# Patient Record
Sex: FEMALE | Race: BLACK OR AFRICAN AMERICAN | Employment: OTHER | URBAN - METROPOLITAN AREA
[De-identification: names, ages, dates, MRNs, and addresses within clinical notes are randomized per-mention and may not be internally consistent; named-entity substitution may affect disease eponyms.]

---

## 2017-01-16 ENCOUNTER — OFFICE VISIT (OUTPATIENT)
Dept: PRIMARY CARE CLINIC | Age: 82
End: 2017-01-16

## 2017-01-16 VITALS
BODY MASS INDEX: 32.49 KG/M2 | DIASTOLIC BLOOD PRESSURE: 72 MMHG | WEIGHT: 195 LBS | HEIGHT: 65 IN | SYSTOLIC BLOOD PRESSURE: 134 MMHG

## 2017-01-16 DIAGNOSIS — I10 ESSENTIAL HYPERTENSION: ICD-10-CM

## 2017-01-16 PROCEDURE — 99213 OFFICE O/P EST LOW 20 MIN: CPT | Performed by: INTERNAL MEDICINE

## 2017-01-16 ASSESSMENT — ENCOUNTER SYMPTOMS
DIARRHEA: 0
CONSTIPATION: 0
ABDOMINAL PAIN: 0
EYE REDNESS: 0
SORE THROAT: 0
WHEEZING: 0
EYE ITCHING: 0
NAUSEA: 0
VOMITING: 0
COUGH: 0
BACK PAIN: 0
CHEST TIGHTNESS: 0

## 2017-03-01 ENCOUNTER — TELEPHONE (OUTPATIENT)
Dept: PRIMARY CARE CLINIC | Age: 82
End: 2017-03-01

## 2017-03-01 DIAGNOSIS — Z96.659 PAINFUL TOTAL KNEE REPLACEMENT, INITIAL ENCOUNTER (HCC): Primary | ICD-10-CM

## 2017-03-01 DIAGNOSIS — T84.84XA PAINFUL TOTAL KNEE REPLACEMENT, INITIAL ENCOUNTER (HCC): Primary | ICD-10-CM

## 2017-04-17 ENCOUNTER — HOSPITAL ENCOUNTER (OUTPATIENT)
Dept: MAMMOGRAPHY | Age: 82
Discharge: HOME | End: 2017-04-17
Attending: INTERNAL MEDICINE | Admitting: INTERNAL MEDICINE

## 2017-04-17 DIAGNOSIS — Z12.31 VISIT FOR SCREENING MAMMOGRAM: ICD-10-CM

## 2017-04-24 ENCOUNTER — OFFICE VISIT (OUTPATIENT)
Dept: PRIMARY CARE CLINIC | Age: 82
End: 2017-04-24

## 2017-04-24 ENCOUNTER — TELEPHONE (OUTPATIENT)
Dept: PRIMARY CARE CLINIC | Age: 82
End: 2017-04-24

## 2017-04-24 VITALS
DIASTOLIC BLOOD PRESSURE: 72 MMHG | SYSTOLIC BLOOD PRESSURE: 118 MMHG | BODY MASS INDEX: 32.82 KG/M2 | WEIGHT: 197 LBS | HEIGHT: 65 IN

## 2017-04-24 DIAGNOSIS — E11.9 CONTROLLED TYPE 2 DIABETES MELLITUS WITHOUT COMPLICATION, WITHOUT LONG-TERM CURRENT USE OF INSULIN (HCC): Primary | ICD-10-CM

## 2017-04-24 DIAGNOSIS — E78.2 MIXED HYPERLIPIDEMIA: ICD-10-CM

## 2017-04-24 DIAGNOSIS — I10 ESSENTIAL HYPERTENSION: ICD-10-CM

## 2017-04-24 LAB
A/G RATIO: 1.6 (ref 1.1–2.2)
ALBUMIN SERPL-MCNC: 4.2 G/DL (ref 3.4–5)
ALP BLD-CCNC: 65 U/L (ref 40–129)
ALT SERPL-CCNC: 9 U/L (ref 10–40)
ANION GAP SERPL CALCULATED.3IONS-SCNC: 15 MMOL/L (ref 3–16)
AST SERPL-CCNC: 14 U/L (ref 15–37)
BILIRUB SERPL-MCNC: <0.2 MG/DL (ref 0–1)
BUN BLDV-MCNC: 13 MG/DL (ref 7–20)
CALCIUM SERPL-MCNC: 9.2 MG/DL (ref 8.3–10.6)
CHLORIDE BLD-SCNC: 103 MMOL/L (ref 99–110)
CHOLESTEROL, TOTAL: 160 MG/DL (ref 0–199)
CO2: 26 MMOL/L (ref 21–32)
CREAT SERPL-MCNC: 0.8 MG/DL (ref 0.6–1.2)
GFR AFRICAN AMERICAN: >60
GFR NON-AFRICAN AMERICAN: >60
GLOBULIN: 2.7 G/DL
GLUCOSE BLD-MCNC: 115 MG/DL (ref 70–99)
HDLC SERPL-MCNC: 64 MG/DL (ref 40–60)
LDL CHOLESTEROL CALCULATED: 69 MG/DL
POTASSIUM SERPL-SCNC: 4.7 MMOL/L (ref 3.5–5.1)
SODIUM BLD-SCNC: 144 MMOL/L (ref 136–145)
TOTAL PROTEIN: 6.9 G/DL (ref 6.4–8.2)
TRIGL SERPL-MCNC: 136 MG/DL (ref 0–150)
VLDLC SERPL CALC-MCNC: 27 MG/DL

## 2017-04-24 PROCEDURE — 99214 OFFICE O/P EST MOD 30 MIN: CPT | Performed by: INTERNAL MEDICINE

## 2017-04-24 PROCEDURE — 36415 COLL VENOUS BLD VENIPUNCTURE: CPT | Performed by: INTERNAL MEDICINE

## 2017-04-24 ASSESSMENT — ENCOUNTER SYMPTOMS
VOMITING: 0
EYE REDNESS: 0
BACK PAIN: 0
WHEEZING: 0
ABDOMINAL PAIN: 0
NAUSEA: 0
EYE ITCHING: 0
COUGH: 0
CONSTIPATION: 0
CHEST TIGHTNESS: 0
DIARRHEA: 0
SORE THROAT: 0

## 2017-04-25 LAB
ESTIMATED AVERAGE GLUCOSE: 131.2 MG/DL
HBA1C MFR BLD: 6.2 %

## 2017-06-12 RX ORDER — AMLODIPINE BESYLATE 5 MG/1
TABLET ORAL
Qty: 90 TABLET | Refills: 0 | Status: SHIPPED | OUTPATIENT
Start: 2017-06-12 | End: 2017-07-31

## 2017-06-21 RX ORDER — OMEPRAZOLE 40 MG/1
CAPSULE, DELAYED RELEASE ORAL
Qty: 90 CAPSULE | Refills: 0 | Status: SHIPPED | OUTPATIENT
Start: 2017-06-21 | End: 2017-09-17 | Stop reason: SDUPTHER

## 2017-07-31 ENCOUNTER — OFFICE VISIT (OUTPATIENT)
Dept: PRIMARY CARE CLINIC | Age: 82
End: 2017-07-31

## 2017-07-31 VITALS
WEIGHT: 198.8 LBS | SYSTOLIC BLOOD PRESSURE: 148 MMHG | HEART RATE: 72 BPM | BODY MASS INDEX: 33.08 KG/M2 | DIASTOLIC BLOOD PRESSURE: 74 MMHG

## 2017-07-31 DIAGNOSIS — I10 ESSENTIAL HYPERTENSION: ICD-10-CM

## 2017-07-31 DIAGNOSIS — E78.2 MIXED HYPERLIPIDEMIA: ICD-10-CM

## 2017-07-31 DIAGNOSIS — E11.9 CONTROLLED TYPE 2 DIABETES MELLITUS WITHOUT COMPLICATION, WITHOUT LONG-TERM CURRENT USE OF INSULIN (HCC): ICD-10-CM

## 2017-07-31 PROCEDURE — 99214 OFFICE O/P EST MOD 30 MIN: CPT | Performed by: INTERNAL MEDICINE

## 2017-07-31 RX ORDER — LISINOPRIL 20 MG/1
20 TABLET ORAL DAILY
Qty: 30 TABLET | Refills: 1 | Status: SHIPPED | OUTPATIENT
Start: 2017-07-31 | End: 2017-07-31 | Stop reason: SDUPTHER

## 2017-07-31 RX ORDER — LISINOPRIL 20 MG/1
TABLET ORAL
Qty: 90 TABLET | Refills: 1 | Status: SHIPPED | OUTPATIENT
Start: 2017-07-31 | End: 2017-08-28 | Stop reason: SDUPTHER

## 2017-07-31 ASSESSMENT — ENCOUNTER SYMPTOMS
CONSTIPATION: 0
WHEEZING: 0
BACK PAIN: 0
CHEST TIGHTNESS: 0
VOMITING: 0
ABDOMINAL PAIN: 0
EYE ITCHING: 0
NAUSEA: 0
DIARRHEA: 0
SORE THROAT: 0
EYE REDNESS: 0
COUGH: 0

## 2017-07-31 ASSESSMENT — PATIENT HEALTH QUESTIONNAIRE - PHQ9
SUM OF ALL RESPONSES TO PHQ QUESTIONS 1-9: 0
1. LITTLE INTEREST OR PLEASURE IN DOING THINGS: 0
2. FEELING DOWN, DEPRESSED OR HOPELESS: 0
SUM OF ALL RESPONSES TO PHQ9 QUESTIONS 1 & 2: 0

## 2017-08-10 RX ORDER — FLUTICASONE PROPIONATE 50 MCG
SPRAY, SUSPENSION (ML) NASAL
Qty: 1 BOTTLE | Refills: 3 | Status: SHIPPED | OUTPATIENT
Start: 2017-08-10 | End: 2017-10-04 | Stop reason: SDUPTHER

## 2017-08-28 ENCOUNTER — OFFICE VISIT (OUTPATIENT)
Dept: PRIMARY CARE CLINIC | Age: 82
End: 2017-08-28

## 2017-08-28 VITALS
SYSTOLIC BLOOD PRESSURE: 148 MMHG | HEIGHT: 65 IN | HEART RATE: 75 BPM | WEIGHT: 204.6 LBS | DIASTOLIC BLOOD PRESSURE: 72 MMHG | BODY MASS INDEX: 34.09 KG/M2

## 2017-08-28 DIAGNOSIS — I10 ESSENTIAL HYPERTENSION: ICD-10-CM

## 2017-08-28 PROCEDURE — G0008 ADMIN INFLUENZA VIRUS VAC: HCPCS | Performed by: INTERNAL MEDICINE

## 2017-08-28 PROCEDURE — 90662 IIV NO PRSV INCREASED AG IM: CPT | Performed by: INTERNAL MEDICINE

## 2017-08-28 PROCEDURE — 99213 OFFICE O/P EST LOW 20 MIN: CPT | Performed by: INTERNAL MEDICINE

## 2017-08-28 RX ORDER — CETIRIZINE HYDROCHLORIDE 10 MG/1
10 TABLET ORAL DAILY
Qty: 30 TABLET | Refills: 1 | Status: SHIPPED | OUTPATIENT
Start: 2017-08-28 | End: 2017-12-13

## 2017-08-28 RX ORDER — LISINOPRIL 40 MG/1
40 TABLET ORAL DAILY
Qty: 90 TABLET | Refills: 0 | Status: SHIPPED | OUTPATIENT
Start: 2017-08-28 | End: 2017-12-13

## 2017-08-28 ASSESSMENT — ENCOUNTER SYMPTOMS
DIARRHEA: 0
BACK PAIN: 0
CONSTIPATION: 0
VOMITING: 0
EYE REDNESS: 0
SORE THROAT: 0
EYE ITCHING: 0
WHEEZING: 0
NAUSEA: 0
ABDOMINAL PAIN: 0
COUGH: 0
CHEST TIGHTNESS: 0

## 2017-09-11 ENCOUNTER — TELEPHONE (OUTPATIENT)
Dept: PRIMARY CARE CLINIC | Age: 82
End: 2017-09-11

## 2017-09-13 ENCOUNTER — OFFICE VISIT (OUTPATIENT)
Dept: PRIMARY CARE CLINIC | Age: 82
End: 2017-09-13

## 2017-09-13 ENCOUNTER — HOSPITAL ENCOUNTER (OUTPATIENT)
Dept: OTHER | Age: 82
Discharge: HOME | End: 2017-09-13
Attending: INTERNAL MEDICINE | Admitting: INTERNAL MEDICINE

## 2017-09-13 VITALS
HEART RATE: 64 BPM | OXYGEN SATURATION: 100 % | SYSTOLIC BLOOD PRESSURE: 142 MMHG | TEMPERATURE: 98.7 F | WEIGHT: 200.4 LBS | BODY MASS INDEX: 33.35 KG/M2 | DIASTOLIC BLOOD PRESSURE: 86 MMHG

## 2017-09-13 DIAGNOSIS — R05.9 COUGH: ICD-10-CM

## 2017-09-13 DIAGNOSIS — R05.9 COUGH: Primary | ICD-10-CM

## 2017-09-13 PROCEDURE — 99214 OFFICE O/P EST MOD 30 MIN: CPT | Performed by: INTERNAL MEDICINE

## 2017-09-13 RX ORDER — AZITHROMYCIN 250 MG/1
TABLET, FILM COATED ORAL
Qty: 6 TABLET | Refills: 0 | Status: SHIPPED | OUTPATIENT
Start: 2017-09-13 | End: 2017-09-23

## 2017-09-13 RX ORDER — BENZONATATE 100 MG/1
100 CAPSULE ORAL 3 TIMES DAILY PRN
Qty: 21 CAPSULE | Refills: 0 | Status: SHIPPED | OUTPATIENT
Start: 2017-09-13 | End: 2017-09-20

## 2017-09-18 RX ORDER — OMEPRAZOLE 40 MG/1
CAPSULE, DELAYED RELEASE ORAL
Qty: 90 CAPSULE | Refills: 0 | Status: SHIPPED | OUTPATIENT
Start: 2017-09-18 | End: 2017-12-16 | Stop reason: SDUPTHER

## 2017-10-02 ENCOUNTER — TELEPHONE (OUTPATIENT)
Dept: PRIMARY CARE CLINIC | Age: 82
End: 2017-10-02

## 2017-10-02 NOTE — TELEPHONE ENCOUNTER
Patient calling saying that the medication that Dr. Iris Baltazar prescribed for the cough is not working she said that she has had this for almost 3 months. She took her ABX but still nothing is helping. She stated that she does have an appointment for Monday the 9th.

## 2017-10-03 NOTE — TELEPHONE ENCOUNTER
Ask her if she is using zyrtec flonase and singulair. If not start all three and farrukh demarco as planned.

## 2017-10-04 RX ORDER — FLUTICASONE PROPIONATE 50 MCG
SPRAY, SUSPENSION (ML) NASAL
Qty: 1 BOTTLE | Refills: 3 | Status: SHIPPED | OUTPATIENT
Start: 2017-10-04 | End: 2017-10-04 | Stop reason: SDUPTHER

## 2017-10-04 RX ORDER — FLUTICASONE PROPIONATE 50 MCG
SPRAY, SUSPENSION (ML) NASAL
Qty: 3 BOTTLE | Refills: 0 | Status: SHIPPED | OUTPATIENT
Start: 2017-10-04 | End: 2018-07-11

## 2017-10-04 NOTE — TELEPHONE ENCOUNTER
Spoke to patient and she was not using the Flonase nasal spray and said she does not have it. Advised patient that rx would be called to the pharmacy for her.

## 2017-10-09 ENCOUNTER — OFFICE VISIT (OUTPATIENT)
Dept: PRIMARY CARE CLINIC | Age: 82
End: 2017-10-09

## 2017-10-09 VITALS
BODY MASS INDEX: 33.66 KG/M2 | WEIGHT: 202 LBS | DIASTOLIC BLOOD PRESSURE: 76 MMHG | HEIGHT: 65 IN | SYSTOLIC BLOOD PRESSURE: 138 MMHG

## 2017-10-09 DIAGNOSIS — I10 ESSENTIAL HYPERTENSION: ICD-10-CM

## 2017-10-09 DIAGNOSIS — E11.9 CONTROLLED TYPE 2 DIABETES MELLITUS WITHOUT COMPLICATION, WITHOUT LONG-TERM CURRENT USE OF INSULIN (HCC): ICD-10-CM

## 2017-10-09 DIAGNOSIS — E11.9 CONTROLLED TYPE 2 DIABETES MELLITUS WITHOUT COMPLICATION, WITHOUT LONG-TERM CURRENT USE OF INSULIN (HCC): Primary | ICD-10-CM

## 2017-10-09 PROCEDURE — 99213 OFFICE O/P EST LOW 20 MIN: CPT | Performed by: INTERNAL MEDICINE

## 2017-10-09 RX ORDER — GABAPENTIN 300 MG/1
CAPSULE ORAL
Qty: 90 CAPSULE | Refills: 1 | Status: SHIPPED | OUTPATIENT
Start: 2017-10-09 | End: 2017-12-08 | Stop reason: SDUPTHER

## 2017-10-09 ASSESSMENT — ENCOUNTER SYMPTOMS
VOMITING: 0
COUGH: 0
EYE ITCHING: 0
EYE REDNESS: 0
BACK PAIN: 0
DIARRHEA: 0
CHEST TIGHTNESS: 0
SORE THROAT: 0
NAUSEA: 0
WHEEZING: 0
CONSTIPATION: 0
ABDOMINAL PAIN: 0

## 2017-10-09 NOTE — ASSESSMENT & PLAN NOTE
Stable. BP improved. Cough significantly improved with restart of allergy meds. -Topic and history reviewed. -Continue current regimen.

## 2017-10-09 NOTE — PROGRESS NOTES
Head: Normocephalic and atraumatic. Eyes: Conjunctivae are normal. Right eye exhibits no discharge. Left eye exhibits no discharge. No scleral icterus. Cardiovascular: Normal rate, regular rhythm and normal heart sounds. Exam reveals no gallop and no friction rub. No murmur heard. Pulmonary/Chest: Effort normal and breath sounds normal. No respiratory distress. She has no wheezes. She has no rales. Abdominal: Soft. Bowel sounds are normal. There is no tenderness. There is no guarding. Neurological: She is alert. Coordination normal.   Skin: Skin is warm and dry. She is not diaphoretic. Psychiatric: She has a normal mood and affect. Judgment normal.   Vitals reviewed. Assessment:      HTN (hypertension)  Stable. BP improved. Cough significantly improved with restart of allergy meds. -Topic and history reviewed. -Continue current regimen.           Plan:      Above

## 2017-10-10 LAB
ESTIMATED AVERAGE GLUCOSE: 134.1 MG/DL
HBA1C MFR BLD: 6.3 %

## 2017-10-20 ENCOUNTER — TELEPHONE (OUTPATIENT)
Dept: PRIMARY CARE CLINIC | Age: 82
End: 2017-10-20

## 2017-10-20 DIAGNOSIS — R09.81 NASAL CONGESTION: Primary | ICD-10-CM

## 2017-10-23 ENCOUNTER — TELEPHONE (OUTPATIENT)
Dept: PRIMARY CARE CLINIC | Age: 82
End: 2017-10-23

## 2017-10-31 RX ORDER — MONTELUKAST SODIUM 10 MG/1
TABLET ORAL
Qty: 30 TABLET | Refills: 3 | Status: SHIPPED | OUTPATIENT
Start: 2017-10-31

## 2017-12-13 ENCOUNTER — OFFICE VISIT (OUTPATIENT)
Dept: PRIMARY CARE CLINIC | Age: 82
End: 2017-12-13

## 2017-12-13 VITALS
SYSTOLIC BLOOD PRESSURE: 170 MMHG | DIASTOLIC BLOOD PRESSURE: 84 MMHG | BODY MASS INDEX: 33.88 KG/M2 | WEIGHT: 203.6 LBS | HEART RATE: 72 BPM

## 2017-12-13 DIAGNOSIS — I10 ESSENTIAL HYPERTENSION: ICD-10-CM

## 2017-12-13 DIAGNOSIS — E11.9 CONTROLLED TYPE 2 DIABETES MELLITUS WITHOUT COMPLICATION, WITHOUT LONG-TERM CURRENT USE OF INSULIN (HCC): ICD-10-CM

## 2017-12-13 DIAGNOSIS — E78.2 MIXED HYPERLIPIDEMIA: ICD-10-CM

## 2017-12-13 DIAGNOSIS — J30.1 SEASONAL ALLERGIC RHINITIS DUE TO POLLEN, UNSPECIFIED CHRONICITY: ICD-10-CM

## 2017-12-13 PROCEDURE — 1090F PRES/ABSN URINE INCON ASSESS: CPT | Performed by: INTERNAL MEDICINE

## 2017-12-13 PROCEDURE — 4040F PNEUMOC VAC/ADMIN/RCVD: CPT | Performed by: INTERNAL MEDICINE

## 2017-12-13 PROCEDURE — 1123F ACP DISCUSS/DSCN MKR DOCD: CPT | Performed by: INTERNAL MEDICINE

## 2017-12-13 PROCEDURE — 99214 OFFICE O/P EST MOD 30 MIN: CPT | Performed by: INTERNAL MEDICINE

## 2017-12-13 PROCEDURE — G8427 DOCREV CUR MEDS BY ELIG CLIN: HCPCS | Performed by: INTERNAL MEDICINE

## 2017-12-13 PROCEDURE — 1036F TOBACCO NON-USER: CPT | Performed by: INTERNAL MEDICINE

## 2017-12-13 PROCEDURE — G8417 CALC BMI ABV UP PARAM F/U: HCPCS | Performed by: INTERNAL MEDICINE

## 2017-12-13 PROCEDURE — G8484 FLU IMMUNIZE NO ADMIN: HCPCS | Performed by: INTERNAL MEDICINE

## 2017-12-13 RX ORDER — DILTIAZEM HYDROCHLORIDE 180 MG/1
180 CAPSULE, COATED, EXTENDED RELEASE ORAL DAILY
Qty: 30 CAPSULE | Refills: 3 | Status: CANCELLED | OUTPATIENT
Start: 2017-12-13

## 2017-12-13 RX ORDER — DILTIAZEM HYDROCHLORIDE 180 MG/1
CAPSULE, COATED, EXTENDED RELEASE ORAL
Qty: 90 CAPSULE | Refills: 0 | Status: SHIPPED | OUTPATIENT
Start: 2017-12-13 | End: 2018-03-04 | Stop reason: SDUPTHER

## 2017-12-13 ASSESSMENT — ENCOUNTER SYMPTOMS
CHEST TIGHTNESS: 0
NAUSEA: 0
COUGH: 0
DIARRHEA: 0
WHEEZING: 0
BACK PAIN: 0
VOMITING: 0
ABDOMINAL PAIN: 0
EYE ITCHING: 0
CONSTIPATION: 0
EYE REDNESS: 0
SORE THROAT: 0

## 2017-12-13 NOTE — PROGRESS NOTES
Subjective:      Patient ID: Donavan Councilman is a 80 y.o. female. HPI     Patient having cough that is dry and with some mucous in back of throat. Daily and has been occurring for few months. No F/C/ear pain/ST. Otherwise in baseline health. Patient Active Problem List   Diagnosis    Diabetes mellitus type II, controlled ([facility] [address]  diabetes    Current sx include none  Patient DENIES hypoglycemia , chest pain, claudication, dyspnea, polyuria, polydipsia, unintentional weight loss, foot pain or lesions, paresthesias. patient does not check sugars . Patient's  diabetes is treated with low carbohydrate diet, and regular exercise. Patient  denies side effects from prescribed  medications and is compliant.  HTN (hypertension)  HYPERTENSION. Also here for f/u HTN. She indicates that she is feeling well and denies any symptoms referable to her elevated blood pressure. Specifically denies chest pain, palpitations, dyspnea, orthopnea, PND or peripheral edema. No anorexia, arthralgia, or leg cramps noted. Current medication regimen is as listed below. She denies any side effects of medication, and has been taking it regularly. Patient does not exercise regularly      Hyperlipidemia  Dyslipidemia: Patient presents for f/u of lipid disorder. Compliance with treatment thus far has been excellent. A repeat fasting lipid profile was not done. The patient does not have family history of premature CAD. The patient denies side effects from medication including nausea and myalgias. The patient exercises never. Patient is overweight.  Peptic ulcer    Seasonal allergies  Has had some symptoms as above. Taking meds.     Health care maintenance    Cervical radiculopathy at C5 bilaterally and left C7       Vitals:    12/13/17 0850   BP: (!) 170/84   Site: Left Arm   Position: Sitting   Cuff Size: Large Adult   Pulse: 72   Weight: 203 lb 9.6 oz (92.4 kg)      Wt Readings from Last 2 Encounters:   12/13/17 hematuria. Musculoskeletal: Positive for arthralgias. Negative for back pain and joint swelling. Skin: Negative for rash. Neurological: Negative for weakness and headaches. Psychiatric/Behavioral: Negative for dysphoric mood. Objective:   Physical Exam   Constitutional: She appears well-developed and well-nourished. No distress. HENT:   Head: Normocephalic and atraumatic. Eyes: Conjunctivae are normal. Right eye exhibits no discharge. Left eye exhibits no discharge. No scleral icterus. Cardiovascular: Normal rate, regular rhythm and normal heart sounds. Exam reveals no gallop and no friction rub. No murmur heard. Pulmonary/Chest: Effort normal and breath sounds normal. No respiratory distress. She has no wheezes. She has no rales. Abdominal: Soft. Bowel sounds are normal. There is no tenderness. There is no guarding. Neurological: She is alert. Coordination normal.   Skin: Skin is warm and dry. She is not diaphoretic. Psychiatric: She has a normal mood and affect. Judgment normal.   Vitals reviewed. Assessment:      Diabetes mellitus type II, controlled ([address]  Stable. -Continue current regimen. HTN (hypertension)  BP elevated. Cough dry and daily. Would like to go back to diltiazem CD which we stopped due to cost concerns.    -Topic and history reviewed. -D/C lisinopril and restart dilt extended release.  -Follow up one month with BP readings. Hyperlipidemia  Stable. No problem with med.    -Continue current regimen. Seasonal allergies  Cough is new. Taking meds. May be related to lisinopril which we D/C today.    -Continue current allergy regimen for now.           Plan:      Above

## 2017-12-18 RX ORDER — OMEPRAZOLE 40 MG/1
CAPSULE, DELAYED RELEASE ORAL
Qty: 90 CAPSULE | Refills: 0 | Status: SHIPPED | OUTPATIENT
Start: 2017-12-18 | End: 2018-03-15 | Stop reason: SDUPTHER

## 2018-01-04 ENCOUNTER — TELEPHONE (OUTPATIENT)
Dept: PRIMARY CARE CLINIC | Age: 83
End: 2018-01-04

## 2018-01-04 NOTE — TELEPHONE ENCOUNTER
Patient has appt on Monday but daughter wanted you aware of that she has a cough which forms mucous in her mouth so you had switched her BP medication. She has seen specialist for this and everything checked out ok so daughter wanted to make you aware because she thinks it could be the medication still.     ELIO

## 2018-01-08 ENCOUNTER — OFFICE VISIT (OUTPATIENT)
Dept: PRIMARY CARE CLINIC | Age: 83
End: 2018-01-08

## 2018-01-08 ENCOUNTER — TELEPHONE (OUTPATIENT)
Dept: PULMONOLOGY | Age: 83
End: 2018-01-08

## 2018-01-08 ENCOUNTER — TELEPHONE (OUTPATIENT)
Dept: PRIMARY CARE CLINIC | Age: 83
End: 2018-01-08

## 2018-01-08 VITALS
DIASTOLIC BLOOD PRESSURE: 76 MMHG | BODY MASS INDEX: 32.65 KG/M2 | WEIGHT: 196 LBS | HEART RATE: 80 BPM | SYSTOLIC BLOOD PRESSURE: 138 MMHG | HEIGHT: 65 IN

## 2018-01-08 DIAGNOSIS — E11.9 CONTROLLED TYPE 2 DIABETES MELLITUS WITHOUT COMPLICATION, WITHOUT LONG-TERM CURRENT USE OF INSULIN (HCC): ICD-10-CM

## 2018-01-08 DIAGNOSIS — R05.9 COUGH: Primary | ICD-10-CM

## 2018-01-08 DIAGNOSIS — I10 ESSENTIAL HYPERTENSION: ICD-10-CM

## 2018-01-08 DIAGNOSIS — E78.2 MIXED HYPERLIPIDEMIA: ICD-10-CM

## 2018-01-08 PROCEDURE — G8484 FLU IMMUNIZE NO ADMIN: HCPCS | Performed by: INTERNAL MEDICINE

## 2018-01-08 PROCEDURE — G8417 CALC BMI ABV UP PARAM F/U: HCPCS | Performed by: INTERNAL MEDICINE

## 2018-01-08 PROCEDURE — 1123F ACP DISCUSS/DSCN MKR DOCD: CPT | Performed by: INTERNAL MEDICINE

## 2018-01-08 PROCEDURE — 99214 OFFICE O/P EST MOD 30 MIN: CPT | Performed by: INTERNAL MEDICINE

## 2018-01-08 PROCEDURE — 1090F PRES/ABSN URINE INCON ASSESS: CPT | Performed by: INTERNAL MEDICINE

## 2018-01-08 PROCEDURE — G8427 DOCREV CUR MEDS BY ELIG CLIN: HCPCS | Performed by: INTERNAL MEDICINE

## 2018-01-08 PROCEDURE — 4040F PNEUMOC VAC/ADMIN/RCVD: CPT | Performed by: INTERNAL MEDICINE

## 2018-01-08 PROCEDURE — 1036F TOBACCO NON-USER: CPT | Performed by: INTERNAL MEDICINE

## 2018-01-08 ASSESSMENT — ENCOUNTER SYMPTOMS
BACK PAIN: 0
ABDOMINAL PAIN: 0
VOMITING: 0
EYE ITCHING: 0
WHEEZING: 0
EYE REDNESS: 0
CHEST TIGHTNESS: 0
SORE THROAT: 0
NAUSEA: 0
DIARRHEA: 0
CONSTIPATION: 0
COUGH: 0

## 2018-01-08 NOTE — PROGRESS NOTES
Height: 5' 5\" (1.651 m)       Wt Readings from Last 2 Encounters:   01/08/18 196 lb (88.9 kg)   12/13/17 203 lb 9.6 oz (92.4 kg)     BP Readings from Last 3 Encounters:   01/08/18 138/76   12/13/17 (!) 170/84   10/09/17 138/76        Allergies   Allergen Reactions    Amlodipine Itching    Pcn [Penicillins] Swelling     Outpatient Prescriptions Marked as Taking for the 1/8/18 encounter (Office Visit) with Monica Nugent MD   Medication Sig Dispense Refill    omeprazole (PRILOSEC) 40 MG delayed release capsule TAKE 1 CAPSULE BY MOUTH DAILY 90 capsule 0    diltiazem (CARTIA XT) 180 MG extended release capsule TAKE ONE CAPSULE BY MOUTH DAILY 90 capsule 0    montelukast (SINGULAIR) 10 MG tablet TAKE 1 TABLET BY MOUTH DAILY FOR ALLERGIES 30 tablet 3    fluticasone (FLONASE) 50 MCG/ACT nasal spray SHAKE LIQUID AND USE 2 SPRAYS IN EACH NOSTRIL DAILY 3 Bottle 0    metFORMIN (GLUCOPHAGE) 500 MG tablet TAKE 1 TABLET BY MOUTH TWICE DAILY WITH MEALS 180 tablet 1    Lift Chair MISC by Does not apply route 1 each 0    Iron-Vitamins (GERITOL PO) Take 1 tablet by mouth daily.  simvastatin (ZOCOR) 20 MG tablet Take 20 mg by mouth nightly.  aspirin  MG EC tablet Take 81 mg by mouth once.  multivitamin (ANTIOXIDANT;PROSIGHT) TABS per tablet Take 1 tablet by mouth daily. Review of Systems   Constitutional: Negative for activity change, appetite change, chills, diaphoresis, fever and unexpected weight change. HENT: Negative for congestion, ear pain and sore throat. Eyes: Negative for redness and itching. Respiratory: Negative for cough, chest tightness and wheezing. Cardiovascular: Negative for chest pain, palpitations and leg swelling. Gastrointestinal: Negative for abdominal pain, constipation, diarrhea, nausea and vomiting. Genitourinary: Negative for difficulty urinating, dysuria and hematuria. Musculoskeletal: Positive for arthralgias.  Negative for back pain and joint

## 2018-01-08 NOTE — TELEPHONE ENCOUNTER
Referred By:   Reason:coughing with thick sputum, so thick that she has to pull it out. Previous Testing:cxr done 9-2017    Insurance:[facility]     New Testing Needed:please advise if patient need New cxr and or PFT's     Appt Date/Time:will vashti appt once I get answers for additional testing.

## 2018-01-08 NOTE — TELEPHONE ENCOUNTER
I discussed with patient and she was OK with plan. I put in order for lung specialist if she would like to get this earlier.

## 2018-01-22 RX ORDER — LISINOPRIL 20 MG/1
TABLET ORAL
Qty: 90 TABLET | Refills: 0 | Status: SHIPPED | OUTPATIENT
Start: 2018-01-22 | End: 2018-04-20 | Stop reason: SDUPTHER

## 2018-01-23 ENCOUNTER — OFFICE VISIT (OUTPATIENT)
Dept: PULMONOLOGY | Age: 83
End: 2018-01-23

## 2018-01-23 VITALS
RESPIRATION RATE: 16 BRPM | BODY MASS INDEX: 32.49 KG/M2 | SYSTOLIC BLOOD PRESSURE: 140 MMHG | WEIGHT: 195 LBS | HEART RATE: 61 BPM | DIASTOLIC BLOOD PRESSURE: 72 MMHG | OXYGEN SATURATION: 99 % | HEIGHT: 65 IN

## 2018-01-23 DIAGNOSIS — R05.3 CHRONIC COUGH: Primary | ICD-10-CM

## 2018-01-23 PROCEDURE — G8484 FLU IMMUNIZE NO ADMIN: HCPCS | Performed by: INTERNAL MEDICINE

## 2018-01-23 PROCEDURE — G8427 DOCREV CUR MEDS BY ELIG CLIN: HCPCS | Performed by: INTERNAL MEDICINE

## 2018-01-23 PROCEDURE — G8417 CALC BMI ABV UP PARAM F/U: HCPCS | Performed by: INTERNAL MEDICINE

## 2018-01-23 PROCEDURE — 4040F PNEUMOC VAC/ADMIN/RCVD: CPT | Performed by: INTERNAL MEDICINE

## 2018-01-23 PROCEDURE — 1036F TOBACCO NON-USER: CPT | Performed by: INTERNAL MEDICINE

## 2018-01-23 PROCEDURE — 1090F PRES/ABSN URINE INCON ASSESS: CPT | Performed by: INTERNAL MEDICINE

## 2018-01-23 PROCEDURE — 99204 OFFICE O/P NEW MOD 45 MIN: CPT | Performed by: INTERNAL MEDICINE

## 2018-01-23 PROCEDURE — 1123F ACP DISCUSS/DSCN MKR DOCD: CPT | Performed by: INTERNAL MEDICINE

## 2018-01-30 ENCOUNTER — HOSPITAL ENCOUNTER (OUTPATIENT)
Dept: PULMONOLOGY | Age: 83
Discharge: HOME | End: 2018-01-30
Attending: INTERNAL MEDICINE | Admitting: INTERNAL MEDICINE

## 2018-01-30 DIAGNOSIS — R05.3 CHRONIC COUGH: ICD-10-CM

## 2018-01-30 DIAGNOSIS — R05.9 COUGH: ICD-10-CM

## 2018-01-30 RX ORDER — ALBUTEROL SULFATE 2.5 MG/3ML
2.5 SOLUTION RESPIRATORY (INHALATION) EVERY 10 MIN PRN
Status: DISCONTINUED | OUTPATIENT
Start: 2018-01-30 | End: 2018-01-31 | Stop reason: HOSPADM

## 2018-01-30 RX ADMIN — ALBUTEROL SULFATE 2.5 MG: 2.5 SOLUTION RESPIRATORY (INHALATION) at 14:45

## 2018-01-30 RX ADMIN — ALBUTEROL SULFATE 2.5 MG: 2.5 SOLUTION RESPIRATORY (INHALATION) at 14:38

## 2018-02-02 NOTE — PROCEDURES
[facility]                 [address]                                PULMONARY FUNCTION    PATIENT NAME: Jack Jenkins                  :        1930  MED REC NO:   [id number]                          ROOM:  ACCOUNT NO:   [de-identified]                          ADMIT DATE: 2018  PROVIDER:     Castro Milton MD    PFT AND METHACHOLINE CHALLENGE    DATE OF PROCEDURE:  2018    Spirometry on this patient shows an FEV1 of 1.35, which is 86% of predicted  and a forced vital capacity of 1.95, which is 97% of predicted, giving a  ratio of 69. Total lung capacity and residual volume were in the normal  range . Diffusing capacity was decreased prior to correction, but corrects  to normal with alveolar lung volumes. Methacholine challenge was performed  and the patient had a 32% drop in the FEV1 at the third dose of  methacholine, indicating a positive study. CONCLUSION:  Mild obstructive baseline defect with positive methacholine  challenge. 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         Nick Ricketts MD    D: 2018 16:45:53       T: 2018 21:53:00     [email or web address]  Job#: [id number]     Doc#: [id number]    CC:

## 2018-02-08 ENCOUNTER — OFFICE VISIT (OUTPATIENT)
Dept: PULMONOLOGY | Age: 83
End: 2018-02-08

## 2018-02-08 VITALS
WEIGHT: 197 LBS | DIASTOLIC BLOOD PRESSURE: 74 MMHG | RESPIRATION RATE: 16 BRPM | SYSTOLIC BLOOD PRESSURE: 144 MMHG | BODY MASS INDEX: 32.82 KG/M2 | OXYGEN SATURATION: 98 % | HEART RATE: 79 BPM | HEIGHT: 65 IN

## 2018-02-08 DIAGNOSIS — J45.40 ASTHMA, MODERATE PERSISTENT, POORLY-CONTROLLED: Primary | ICD-10-CM

## 2018-02-08 PROCEDURE — 99214 OFFICE O/P EST MOD 30 MIN: CPT | Performed by: INTERNAL MEDICINE

## 2018-02-08 PROCEDURE — G8427 DOCREV CUR MEDS BY ELIG CLIN: HCPCS | Performed by: INTERNAL MEDICINE

## 2018-02-08 PROCEDURE — 1090F PRES/ABSN URINE INCON ASSESS: CPT | Performed by: INTERNAL MEDICINE

## 2018-02-08 PROCEDURE — 1036F TOBACCO NON-USER: CPT | Performed by: INTERNAL MEDICINE

## 2018-02-08 PROCEDURE — G8417 CALC BMI ABV UP PARAM F/U: HCPCS | Performed by: INTERNAL MEDICINE

## 2018-02-08 PROCEDURE — G8484 FLU IMMUNIZE NO ADMIN: HCPCS | Performed by: INTERNAL MEDICINE

## 2018-02-08 PROCEDURE — 4040F PNEUMOC VAC/ADMIN/RCVD: CPT | Performed by: INTERNAL MEDICINE

## 2018-02-08 PROCEDURE — 1123F ACP DISCUSS/DSCN MKR DOCD: CPT | Performed by: INTERNAL MEDICINE

## 2018-02-08 RX ORDER — ALBUTEROL SULFATE 90 UG/1
2 AEROSOL, METERED RESPIRATORY (INHALATION) EVERY 4 HOURS PRN
Qty: 1 INHALER | Refills: 5 | Status: SHIPPED | OUTPATIENT
Start: 2018-02-08 | End: 2020-09-25 | Stop reason: SDUPTHER

## 2018-02-09 NOTE — PROGRESS NOTES
PO) Take 1 tablet by mouth daily.  simvastatin (ZOCOR) 20 MG tablet Take 20 mg by mouth nightly.  aspirin  MG EC tablet Take 81 mg by mouth once.  multivitamin (ANTIOXIDANT;PROSIGHT) TABS per tablet Take 1 tablet by mouth daily. No current facility-administered medications on file prior to visit. REVIEW OF SYSTEMS:    CONSTITUTIONAL: Negative for fevers and chills  HEENT: Negative for oropharyngeal exudate, post nasal drip, sinus pain / pressure, nasal congestion, ear pain  RESPIRATORY:  See HPI  CARDIOVASCULAR: Negative for chest pain, palpitations, edema  GASTROINTESTINAL: Negative for nausea, vomiting, diarrhea, constipation and abdominal pain  HEMATOLOGICAL: Negative for adenopathy  SKIN: Negative for clubbing, cyanosis, skin lesions  EXTREMITIES: Negative for weakness, decreased ROM  NEUROLOGICAL: Negative for unilateral weakness, speech or gait abnormalities    Objective:   PHYSICAL EXAM:        VITALS:  BP (!) 144/74 (Site: Right Arm, Position: Sitting, Cuff Size: Large Adult)   Pulse 79   Resp 16   Ht 5' 5\" (1.651 m)   Wt 197 lb (89.4 kg)   SpO2 98%   Breastfeeding? No   BMI 32.78 kg/m²     CONSTITUTIONAL:  Awake, alert, cooperative, no apparent distress, and appears stated age  [de-identified]: No oropharyngeal exudate, PERRL, no cervical adenopathy, no tracheal deviation, thyroid size normal  LUNGS:  No increased work of breathing and clear to auscultation, no crackles or wheezing  CARDIOVASCULAR:  normal S1 and S2 and no JVD  ABDOMEN:  Normal bowel sounds, non-distended and non-tender to palpation  EXT: No edema, no calf tenderness. Pulses are present bilaterally. NEUROLOGIC:  Mental Status Exam:  Level of Alertness:   awake  Orientation:   person, place, time. SKIN:  normal skin color, texture, turgor, no redness, warmth, or swelling     DATA:      Radiology Review:  Pertinent images / reports were reviewed as a part of this visit.   CT Chest 1/30 reveals the following:  CT chest without IV including inspiration and expiration images       HISTORY: Chronic cough        Routine examination was carried out from thoracic inlet to upper   abdomen without IV contrast. Repeat imaging was then performed   using high-resolution bone algorithm during inspiration and   expiration       Trachea and mainstem bronchi are patent. Subpleural calcified   granuloma located in the right middle lobe. Another calcified   granulomas located within the lingula. . No consolidation or   pleural effusion. No air trapping.       Normal sized lymph nodes are located in the AP window and   pretracheal space. No hilar or anterior mediastinal   lymphadenopathy given limitation of no IV contrast. No axillary   lymphadenopathy.       Cardiac size normal. Adrenals not enlarged. Visualized sections of   liver and spleen normal. 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 Total lung capacity and residual volume were in the normal  range . Diffusing capacity was decreased prior to correction, but corrects  to normal with alveolar lung volumes. Methacholine challenge was performed  and the patient had a 32% drop in the FEV1 at the third dose of  methacholine, indicating a positive study.     CONCLUSION:  Mild obstructive baseline defect with positive methacholine  challenge.   Findings to be consistent with a diagnosis of COPD

## 2018-03-05 RX ORDER — DILTIAZEM HYDROCHLORIDE 180 MG/1
CAPSULE, EXTENDED RELEASE ORAL
Qty: 90 CAPSULE | Refills: 0 | Status: SHIPPED | OUTPATIENT
Start: 2018-03-05 | End: 2018-05-27 | Stop reason: SDUPTHER

## 2018-03-12 ENCOUNTER — OFFICE VISIT (OUTPATIENT)
Dept: PULMONOLOGY | Age: 83
End: 2018-03-12

## 2018-03-12 VITALS
HEART RATE: 84 BPM | SYSTOLIC BLOOD PRESSURE: 130 MMHG | BODY MASS INDEX: 33.15 KG/M2 | DIASTOLIC BLOOD PRESSURE: 82 MMHG | HEIGHT: 65 IN | RESPIRATION RATE: 18 BRPM | OXYGEN SATURATION: 95 % | WEIGHT: 199 LBS

## 2018-03-12 DIAGNOSIS — J45.40 ASTHMA, MODERATE PERSISTENT, POORLY-CONTROLLED: Primary | ICD-10-CM

## 2018-03-12 DIAGNOSIS — R05.3 CHRONIC COUGH: ICD-10-CM

## 2018-03-12 PROCEDURE — 1036F TOBACCO NON-USER: CPT | Performed by: INTERNAL MEDICINE

## 2018-03-12 PROCEDURE — G8427 DOCREV CUR MEDS BY ELIG CLIN: HCPCS | Performed by: INTERNAL MEDICINE

## 2018-03-12 PROCEDURE — 1123F ACP DISCUSS/DSCN MKR DOCD: CPT | Performed by: INTERNAL MEDICINE

## 2018-03-12 PROCEDURE — 4040F PNEUMOC VAC/ADMIN/RCVD: CPT | Performed by: INTERNAL MEDICINE

## 2018-03-12 PROCEDURE — 1090F PRES/ABSN URINE INCON ASSESS: CPT | Performed by: INTERNAL MEDICINE

## 2018-03-12 PROCEDURE — G8417 CALC BMI ABV UP PARAM F/U: HCPCS | Performed by: INTERNAL MEDICINE

## 2018-03-12 PROCEDURE — 99214 OFFICE O/P EST MOD 30 MIN: CPT | Performed by: INTERNAL MEDICINE

## 2018-03-12 PROCEDURE — G8482 FLU IMMUNIZE ORDER/ADMIN: HCPCS | Performed by: INTERNAL MEDICINE

## 2018-03-15 RX ORDER — OMEPRAZOLE 40 MG/1
CAPSULE, DELAYED RELEASE ORAL
Qty: 90 CAPSULE | Refills: 0 | Status: SHIPPED | OUTPATIENT
Start: 2018-03-15 | End: 2018-06-15 | Stop reason: SDUPTHER

## 2018-03-26 RX ORDER — GABAPENTIN 300 MG/1
300 CAPSULE ORAL 3 TIMES DAILY
Qty: 90 CAPSULE | Refills: 3 | Status: SHIPPED | OUTPATIENT
Start: 2018-03-26 | End: 2018-11-03 | Stop reason: SDUPTHER

## 2018-03-29 ENCOUNTER — TELEPHONE (OUTPATIENT)
Dept: PULMONOLOGY | Age: 83
End: 2018-03-29

## 2018-04-11 ENCOUNTER — OFFICE VISIT (OUTPATIENT)
Dept: PRIMARY CARE CLINIC | Age: 83
End: 2018-04-11

## 2018-04-11 VITALS
DIASTOLIC BLOOD PRESSURE: 76 MMHG | HEART RATE: 64 BPM | SYSTOLIC BLOOD PRESSURE: 148 MMHG | BODY MASS INDEX: 33.65 KG/M2 | WEIGHT: 202.2 LBS

## 2018-04-11 DIAGNOSIS — I10 ESSENTIAL HYPERTENSION: ICD-10-CM

## 2018-04-11 DIAGNOSIS — E11.9 CONTROLLED TYPE 2 DIABETES MELLITUS WITHOUT COMPLICATION, WITHOUT LONG-TERM CURRENT USE OF INSULIN (HCC): ICD-10-CM

## 2018-04-11 DIAGNOSIS — E78.2 MIXED HYPERLIPIDEMIA: ICD-10-CM

## 2018-04-11 DIAGNOSIS — E11.9 CONTROLLED TYPE 2 DIABETES MELLITUS WITHOUT COMPLICATION, WITHOUT LONG-TERM CURRENT USE OF INSULIN (HCC): Primary | ICD-10-CM

## 2018-04-11 LAB
A/G RATIO: 1.6 (ref 1.1–2.2)
ALBUMIN SERPL-MCNC: 4.2 G/DL (ref 3.4–5)
ALP BLD-CCNC: 69 U/L (ref 40–129)
ALT SERPL-CCNC: 9 U/L (ref 10–40)
ANION GAP SERPL CALCULATED.3IONS-SCNC: 12 MMOL/L (ref 3–16)
AST SERPL-CCNC: 15 U/L (ref 15–37)
BILIRUB SERPL-MCNC: <0.2 MG/DL (ref 0–1)
BUN BLDV-MCNC: 15 MG/DL (ref 7–20)
CALCIUM SERPL-MCNC: 8.8 MG/DL (ref 8.3–10.6)
CHLORIDE BLD-SCNC: 102 MMOL/L (ref 99–110)
CHOLESTEROL, TOTAL: 156 MG/DL (ref 0–199)
CO2: 30 MMOL/L (ref 21–32)
CREAT SERPL-MCNC: 0.8 MG/DL (ref 0.6–1.2)
CREATININE URINE: 101.7 MG/DL (ref 28–259)
ESTIMATED AVERAGE GLUCOSE: 128.4 MG/DL
GFR AFRICAN AMERICAN: >60
GFR NON-AFRICAN AMERICAN: >60
GLOBULIN: 2.6 G/DL
GLUCOSE BLD-MCNC: 110 MG/DL (ref 70–99)
HBA1C MFR BLD: 6.1 %
HDLC SERPL-MCNC: 71 MG/DL (ref 40–60)
LDL CHOLESTEROL CALCULATED: 64 MG/DL
MICROALBUMIN UR-MCNC: <1.2 MG/DL
MICROALBUMIN/CREAT UR-RTO: NORMAL MG/G (ref 0–30)
POTASSIUM SERPL-SCNC: 4.7 MMOL/L (ref 3.5–5.1)
SODIUM BLD-SCNC: 144 MMOL/L (ref 136–145)
TOTAL PROTEIN: 6.8 G/DL (ref 6.4–8.2)
TRIGL SERPL-MCNC: 103 MG/DL (ref 0–150)
VLDLC SERPL CALC-MCNC: 21 MG/DL

## 2018-04-11 PROCEDURE — 99214 OFFICE O/P EST MOD 30 MIN: CPT | Performed by: INTERNAL MEDICINE

## 2018-04-11 PROCEDURE — G8427 DOCREV CUR MEDS BY ELIG CLIN: HCPCS | Performed by: INTERNAL MEDICINE

## 2018-04-11 PROCEDURE — 4040F PNEUMOC VAC/ADMIN/RCVD: CPT | Performed by: INTERNAL MEDICINE

## 2018-04-11 PROCEDURE — 1090F PRES/ABSN URINE INCON ASSESS: CPT | Performed by: INTERNAL MEDICINE

## 2018-04-11 PROCEDURE — 1036F TOBACCO NON-USER: CPT | Performed by: INTERNAL MEDICINE

## 2018-04-11 PROCEDURE — 1123F ACP DISCUSS/DSCN MKR DOCD: CPT | Performed by: INTERNAL MEDICINE

## 2018-04-11 PROCEDURE — G8417 CALC BMI ABV UP PARAM F/U: HCPCS | Performed by: INTERNAL MEDICINE

## 2018-04-11 ASSESSMENT — ENCOUNTER SYMPTOMS
EYE REDNESS: 0
COUGH: 0
SORE THROAT: 0
EYE ITCHING: 0
DIARRHEA: 0
BACK PAIN: 0
CHEST TIGHTNESS: 0
CONSTIPATION: 0
NAUSEA: 0
ABDOMINAL PAIN: 0
VOMITING: 0
WHEEZING: 0

## 2018-04-16 ENCOUNTER — TELEPHONE (OUTPATIENT)
Dept: PRIMARY CARE CLINIC | Age: 83
End: 2018-04-16

## 2018-04-23 ENCOUNTER — HOSPITAL ENCOUNTER (OUTPATIENT)
Dept: MAMMOGRAPHY | Age: 83
Discharge: HOME | End: 2018-04-23
Attending: INTERNAL MEDICINE | Admitting: INTERNAL MEDICINE

## 2018-04-23 DIAGNOSIS — Z12.31 VISIT FOR SCREENING MAMMOGRAM: ICD-10-CM

## 2018-05-14 ENCOUNTER — TELEPHONE (OUTPATIENT)
Dept: PULMONOLOGY | Age: 83
End: 2018-05-14

## 2018-05-14 DIAGNOSIS — J45.40 MODERATE PERSISTENT ASTHMA WITHOUT COMPLICATION: Primary | ICD-10-CM

## 2018-05-29 ENCOUNTER — TELEPHONE (OUTPATIENT)
Dept: PULMONOLOGY | Age: 83
End: 2018-05-29

## 2018-05-29 RX ORDER — DILTIAZEM HYDROCHLORIDE 180 MG/1
CAPSULE, EXTENDED RELEASE ORAL
Qty: 90 CAPSULE | Refills: 0 | Status: SHIPPED | OUTPATIENT
Start: 2018-05-29 | End: 2018-07-11 | Stop reason: SDUPTHER

## 2018-05-29 RX ORDER — FLUTICASONE FUROATE AND VILANTEROL 200; 25 UG/1; UG/1
1 POWDER RESPIRATORY (INHALATION) DAILY
Qty: 1 EACH | Refills: 11 | Status: SHIPPED | OUTPATIENT
Start: 2018-05-29 | End: 2019-10-03

## 2018-06-06 RX ORDER — SIMVASTATIN 20 MG
TABLET ORAL
Qty: 90 TABLET | Refills: 0 | Status: SHIPPED | OUTPATIENT
Start: 2018-06-06 | End: 2018-09-06 | Stop reason: SDUPTHER

## 2018-06-12 ENCOUNTER — OFFICE VISIT (OUTPATIENT)
Dept: PULMONOLOGY | Age: 83
End: 2018-06-12

## 2018-06-12 VITALS
HEART RATE: 76 BPM | HEIGHT: 64 IN | DIASTOLIC BLOOD PRESSURE: 74 MMHG | OXYGEN SATURATION: 96 % | SYSTOLIC BLOOD PRESSURE: 132 MMHG | BODY MASS INDEX: 34.83 KG/M2 | RESPIRATION RATE: 16 BRPM | WEIGHT: 204 LBS

## 2018-06-12 DIAGNOSIS — J45.40 MODERATE PERSISTENT ASTHMA WITHOUT COMPLICATION: Primary | ICD-10-CM

## 2018-06-12 DIAGNOSIS — R05.3 CHRONIC COUGH: ICD-10-CM

## 2018-06-12 PROCEDURE — 1036F TOBACCO NON-USER: CPT | Performed by: INTERNAL MEDICINE

## 2018-06-12 PROCEDURE — G8417 CALC BMI ABV UP PARAM F/U: HCPCS | Performed by: INTERNAL MEDICINE

## 2018-06-12 PROCEDURE — 99214 OFFICE O/P EST MOD 30 MIN: CPT | Performed by: INTERNAL MEDICINE

## 2018-06-12 PROCEDURE — 1090F PRES/ABSN URINE INCON ASSESS: CPT | Performed by: INTERNAL MEDICINE

## 2018-06-12 PROCEDURE — 4040F PNEUMOC VAC/ADMIN/RCVD: CPT | Performed by: INTERNAL MEDICINE

## 2018-06-12 PROCEDURE — 1123F ACP DISCUSS/DSCN MKR DOCD: CPT | Performed by: INTERNAL MEDICINE

## 2018-06-12 PROCEDURE — G8427 DOCREV CUR MEDS BY ELIG CLIN: HCPCS | Performed by: INTERNAL MEDICINE

## 2018-06-17 RX ORDER — OMEPRAZOLE 40 MG/1
CAPSULE, DELAYED RELEASE ORAL
Qty: 90 CAPSULE | Refills: 0 | Status: SHIPPED | OUTPATIENT
Start: 2018-06-17 | End: 2018-09-12 | Stop reason: SDUPTHER

## 2018-07-11 RX ORDER — FLUTICASONE PROPIONATE 50 MCG
SPRAY, SUSPENSION (ML) NASAL
Qty: 1 BOTTLE | Refills: 3 | Status: SHIPPED | OUTPATIENT
Start: 2018-07-11 | End: 2019-03-25 | Stop reason: SDUPTHER

## 2018-07-11 RX ORDER — FLUTICASONE PROPIONATE 50 MCG
SPRAY, SUSPENSION (ML) NASAL
Qty: 1 BOTTLE | Refills: 3 | Status: SHIPPED | OUTPATIENT
Start: 2018-07-11 | End: 2018-07-11 | Stop reason: SDUPTHER

## 2018-07-11 RX ORDER — DILTIAZEM HYDROCHLORIDE 180 MG/1
CAPSULE, EXTENDED RELEASE ORAL
Qty: 90 CAPSULE | Refills: 0 | Status: SHIPPED | OUTPATIENT
Start: 2018-07-11 | End: 2018-10-03 | Stop reason: SDUPTHER

## 2018-07-18 ENCOUNTER — OFFICE VISIT (OUTPATIENT)
Dept: PRIMARY CARE CLINIC | Age: 83
End: 2018-07-18

## 2018-07-18 VITALS
TEMPERATURE: 98.2 F | DIASTOLIC BLOOD PRESSURE: 70 MMHG | BODY MASS INDEX: 34.89 KG/M2 | WEIGHT: 204.4 LBS | SYSTOLIC BLOOD PRESSURE: 132 MMHG | HEIGHT: 64 IN

## 2018-07-18 DIAGNOSIS — I10 ESSENTIAL HYPERTENSION: ICD-10-CM

## 2018-07-18 PROCEDURE — 99213 OFFICE O/P EST LOW 20 MIN: CPT | Performed by: INTERNAL MEDICINE

## 2018-07-18 PROCEDURE — 1036F TOBACCO NON-USER: CPT | Performed by: INTERNAL MEDICINE

## 2018-07-18 PROCEDURE — 4040F PNEUMOC VAC/ADMIN/RCVD: CPT | Performed by: INTERNAL MEDICINE

## 2018-07-18 PROCEDURE — 1101F PT FALLS ASSESS-DOCD LE1/YR: CPT | Performed by: INTERNAL MEDICINE

## 2018-07-18 PROCEDURE — 1090F PRES/ABSN URINE INCON ASSESS: CPT | Performed by: INTERNAL MEDICINE

## 2018-07-18 PROCEDURE — G8417 CALC BMI ABV UP PARAM F/U: HCPCS | Performed by: INTERNAL MEDICINE

## 2018-07-18 PROCEDURE — G8427 DOCREV CUR MEDS BY ELIG CLIN: HCPCS | Performed by: INTERNAL MEDICINE

## 2018-07-18 PROCEDURE — 1123F ACP DISCUSS/DSCN MKR DOCD: CPT | Performed by: INTERNAL MEDICINE

## 2018-07-18 ASSESSMENT — ENCOUNTER SYMPTOMS
CHEST TIGHTNESS: 0
EYE ITCHING: 0
DIARRHEA: 0
EYE REDNESS: 0
WHEEZING: 0
COUGH: 0
BACK PAIN: 0
SORE THROAT: 0
CONSTIPATION: 0
ABDOMINAL PAIN: 0
NAUSEA: 0
VOMITING: 0

## 2018-07-23 ENCOUNTER — HOSPITAL ENCOUNTER (EMERGENCY)
Age: 83
Discharge: HOME | End: 2018-07-23
Attending: EMERGENCY MEDICINE
Payer: MEDICARE

## 2018-07-23 ENCOUNTER — APPOINTMENT (OUTPATIENT)
Dept: CT IMAGING | Age: 83
End: 2018-07-23
Payer: MEDICARE

## 2018-07-23 ENCOUNTER — APPOINTMENT (OUTPATIENT)
Dept: GENERAL RADIOLOGY | Age: 83
End: 2018-07-23
Payer: MEDICARE

## 2018-07-23 VITALS
DIASTOLIC BLOOD PRESSURE: 73 MMHG | TEMPERATURE: 97.6 F | RESPIRATION RATE: 15 BRPM | OXYGEN SATURATION: 100 % | HEART RATE: 67 BPM | SYSTOLIC BLOOD PRESSURE: 187 MMHG

## 2018-07-23 DIAGNOSIS — R53.83 FATIGUE, UNSPECIFIED TYPE: Primary | ICD-10-CM

## 2018-07-23 LAB
AMORPHOUS: ABNORMAL /HPF
ANION GAP SERPL CALCULATED.3IONS-SCNC: 11 MMOL/L (ref 3–16)
BACTERIA: ABNORMAL /HPF
BASOPHILS ABSOLUTE: 0.1 K/UL (ref 0–0.2)
BASOPHILS RELATIVE PERCENT: 1.1 %
BILIRUBIN URINE: NEGATIVE
BLOOD, URINE: NEGATIVE
BUN BLDV-MCNC: 18 MG/DL (ref 7–20)
CALCIUM SERPL-MCNC: 9.5 MG/DL (ref 8.3–10.6)
CHLORIDE BLD-SCNC: 102 MMOL/L (ref 99–110)
CLARITY: CLEAR
CO2: 29 MMOL/L (ref 21–32)
COLOR: YELLOW
CREAT SERPL-MCNC: 0.9 MG/DL (ref 0.6–1.2)
EOSINOPHILS ABSOLUTE: 0.1 K/UL (ref 0–0.6)
EOSINOPHILS RELATIVE PERCENT: 1.5 %
EPITHELIAL CELLS, UA: ABNORMAL /HPF
GFR AFRICAN AMERICAN: >60
GFR NON-AFRICAN AMERICAN: 59
GLUCOSE BLD-MCNC: 106 MG/DL (ref 70–99)
GLUCOSE URINE: NEGATIVE MG/DL
HCT VFR BLD CALC: 32.7 % (ref 36–48)
HEMOGLOBIN: 10.1 G/DL (ref 12–16)
KETONES, URINE: NEGATIVE MG/DL
LEUKOCYTE ESTERASE, URINE: ABNORMAL
LYMPHOCYTES ABSOLUTE: 2.7 K/UL (ref 1–5.1)
LYMPHOCYTES RELATIVE PERCENT: 45 %
MCH RBC QN AUTO: 23 PG (ref 26–34)
MCHC RBC AUTO-ENTMCNC: 31 G/DL (ref 31–36)
MCV RBC AUTO: 74.2 FL (ref 80–100)
MICROSCOPIC EXAMINATION: YES
MONOCYTES ABSOLUTE: 0.5 K/UL (ref 0–1.3)
MONOCYTES RELATIVE PERCENT: 8.1 %
NEUTROPHILS ABSOLUTE: 2.7 K/UL (ref 1.7–7.7)
NEUTROPHILS RELATIVE PERCENT: 44.3 %
NITRITE, URINE: NEGATIVE
PDW BLD-RTO: 18.5 % (ref 12.4–15.4)
PH UA: 7
PLATELET # BLD: 219 K/UL (ref 135–450)
PMV BLD AUTO: 8.5 FL (ref 5–10.5)
POTASSIUM REFLEX MAGNESIUM: 4.8 MMOL/L (ref 3.5–5.1)
PROTEIN UA: NEGATIVE MG/DL
RBC # BLD: 4.4 M/UL (ref 4–5.2)
RBC UA: ABNORMAL /HPF (ref 0–2)
SODIUM BLD-SCNC: 142 MMOL/L (ref 136–145)
SPECIFIC GRAVITY UA: 1.01
URINE TYPE: ABNORMAL
UROBILINOGEN, URINE: 0.2 E.U./DL
WBC # BLD: 6 K/UL (ref 4–11)
WBC UA: ABNORMAL /HPF (ref 0–5)

## 2018-07-23 PROCEDURE — 36415 COLL VENOUS BLD VENIPUNCTURE: CPT

## 2018-07-23 PROCEDURE — 70450 CT HEAD/BRAIN W/O DYE: CPT

## 2018-07-23 PROCEDURE — 80048 BASIC METABOLIC PNL TOTAL CA: CPT

## 2018-07-23 PROCEDURE — 99285 EMERGENCY DEPT VISIT HI MDM: CPT

## 2018-07-23 PROCEDURE — 87086 URINE CULTURE/COLONY COUNT: CPT

## 2018-07-23 PROCEDURE — 71046 X-RAY EXAM CHEST 2 VIEWS: CPT

## 2018-07-23 PROCEDURE — 81001 URINALYSIS AUTO W/SCOPE: CPT

## 2018-07-23 PROCEDURE — 93005 ELECTROCARDIOGRAM TRACING: CPT | Performed by: EMERGENCY MEDICINE

## 2018-07-23 PROCEDURE — 85025 COMPLETE CBC W/AUTO DIFF WBC: CPT

## 2018-07-23 ASSESSMENT — ENCOUNTER SYMPTOMS
WHEEZING: 0
VOMITING: 0
COUGH: 0
RHINORRHEA: 0
EYE DISCHARGE: 0
EYE PAIN: 0
ABDOMINAL PAIN: 0
SORE THROAT: 0
BACK PAIN: 0
SHORTNESS OF BREATH: 0
NAUSEA: 0
PHOTOPHOBIA: 0
CHEST TIGHTNESS: 0
DIARRHEA: 0
EYE ITCHING: 0
FACIAL SWELLING: 0

## 2018-07-23 ASSESSMENT — PAIN SCALES - GENERAL: PAINLEVEL_OUTOF10: 0

## 2018-07-24 NOTE — ED PROVIDER NOTES
[address] [facility]          ATTENDING PHYSICIAN NOTE       Date of evaluation: 7/23/2018    Chief Complaint     Dizziness and Fatigue      History of Present Illness     Arturo Aguilar is a 80 y.o. female who presents With a chief complaint of dizziness, fatigue. Per son who provides initial history, he received a call from the patient's neighbor when he was on his way home from work that the neighbor could not wake her up despite several knocks on the door. Then Tried to Call His Mom and Her Phone Was Busy Therefore He Rushed over to Her House and [address]. On His Arrival, She Was Awake and Walking around, but He Asked Her to Come Here to the [address]. He Has This Was Out Of the Ordinary for Her to Not Wake up to [employer] or Someone Knocking on the Door. Patient Similarly Endorses That This Was Out Of Character for Her, but Also States That She Does Not Know If She Had She Passed out. She States She Remembers Going to Her Chair to sit down to sleep, and she took a 2-1/2 hour nap which is slightly longer than her usual.  She denies any chest pain, lightheadedness. She does endorse intermittent dizziness over the last few weeks particularly when she is up and walking around although sometimes upon sitting. Denies any dizziness at this actual time. Denies headache. Denies urinary symptoms. Denies dyspnea. Review of Systems     Review of Systems   Constitutional: Negative for activity change, appetite change, chills and fever. HENT: Negative for congestion, ear pain, facial swelling, nosebleeds, rhinorrhea and sore throat. Eyes: Negative for photophobia, pain, discharge, itching and visual disturbance. Respiratory: Negative for cough, chest tightness, shortness of breath and wheezing. Cardiovascular: Negative for chest pain, palpitations and leg swelling. Gastrointestinal: Negative for abdominal pain, diarrhea, nausea and vomiting.    Endocrine:

## 2018-07-25 LAB — URINE CULTURE, ROUTINE: NORMAL

## 2018-07-30 ENCOUNTER — OFFICE VISIT (OUTPATIENT)
Dept: PULMONOLOGY | Age: 83
End: 2018-07-30

## 2018-07-30 VITALS
HEIGHT: 64 IN | SYSTOLIC BLOOD PRESSURE: 124 MMHG | HEART RATE: 77 BPM | BODY MASS INDEX: 35.68 KG/M2 | OXYGEN SATURATION: 97 % | DIASTOLIC BLOOD PRESSURE: 70 MMHG | RESPIRATION RATE: 16 BRPM | WEIGHT: 209 LBS

## 2018-07-30 DIAGNOSIS — R05.8 UPPER AIRWAY COUGH SYNDROME: ICD-10-CM

## 2018-07-30 DIAGNOSIS — J45.40 MODERATE PERSISTENT ASTHMA WITHOUT COMPLICATION: Primary | ICD-10-CM

## 2018-07-30 PROCEDURE — G8417 CALC BMI ABV UP PARAM F/U: HCPCS | Performed by: INTERNAL MEDICINE

## 2018-07-30 PROCEDURE — 4040F PNEUMOC VAC/ADMIN/RCVD: CPT | Performed by: INTERNAL MEDICINE

## 2018-07-30 PROCEDURE — 1090F PRES/ABSN URINE INCON ASSESS: CPT | Performed by: INTERNAL MEDICINE

## 2018-07-30 PROCEDURE — 1101F PT FALLS ASSESS-DOCD LE1/YR: CPT | Performed by: INTERNAL MEDICINE

## 2018-07-30 PROCEDURE — 1036F TOBACCO NON-USER: CPT | Performed by: INTERNAL MEDICINE

## 2018-07-30 PROCEDURE — 1123F ACP DISCUSS/DSCN MKR DOCD: CPT | Performed by: INTERNAL MEDICINE

## 2018-07-30 PROCEDURE — 99213 OFFICE O/P EST LOW 20 MIN: CPT | Performed by: INTERNAL MEDICINE

## 2018-07-30 PROCEDURE — G8427 DOCREV CUR MEDS BY ELIG CLIN: HCPCS | Performed by: INTERNAL MEDICINE

## 2018-07-31 LAB
EKG ATRIAL RATE: 62 BPM
EKG DIAGNOSIS: NORMAL
EKG P AXIS: 50 DEGREES
EKG P-R INTERVAL: 148 MS
EKG Q-T INTERVAL: 430 MS
EKG QRS DURATION: 82 MS
EKG QTC CALCULATION (BAZETT): 436 MS
EKG R AXIS: 28 DEGREES
EKG T AXIS: 38 DEGREES
EKG VENTRICULAR RATE: 62 BPM

## 2018-09-12 RX ORDER — OMEPRAZOLE 40 MG/1
CAPSULE, DELAYED RELEASE ORAL
Qty: 90 CAPSULE | Refills: 0 | Status: SHIPPED | OUTPATIENT
Start: 2018-09-12 | End: 2019-03-21 | Stop reason: SDUPTHER

## 2018-09-27 NOTE — ED NOTES
Patient reports dizziness and lethargy started today. Patient reports a neighbor had difficulty waking the patient up today which is unusual for her, \"I just slept the day away\". Patient denies chest pain, SOB, fever or cough.       Drea Bell RN  07/23/18 2001 Hypertension

## 2019-01-16 ENCOUNTER — OFFICE VISIT (OUTPATIENT)
Dept: INTERNAL MEDICINE CLINIC | Age: 84
End: 2019-01-16
Payer: MEDICARE

## 2019-01-16 VITALS
OXYGEN SATURATION: 94 % | TEMPERATURE: 98.3 F | DIASTOLIC BLOOD PRESSURE: 80 MMHG | BODY MASS INDEX: 36.14 KG/M2 | WEIGHT: 204 LBS | HEART RATE: 81 BPM | HEIGHT: 63 IN | SYSTOLIC BLOOD PRESSURE: 138 MMHG

## 2019-01-16 DIAGNOSIS — D50.9 MICROCYTIC ANEMIA: ICD-10-CM

## 2019-01-16 DIAGNOSIS — E78.5 HYPERLIPIDEMIA LDL GOAL <100: ICD-10-CM

## 2019-01-16 DIAGNOSIS — E11.9 TYPE 2 DIABETES MELLITUS WITHOUT COMPLICATION, WITHOUT LONG-TERM CURRENT USE OF INSULIN (HCC): Primary | ICD-10-CM

## 2019-01-16 DIAGNOSIS — J45.40 MODERATE PERSISTENT ASTHMA WITHOUT COMPLICATION: ICD-10-CM

## 2019-01-16 DIAGNOSIS — I10 ESSENTIAL HYPERTENSION: ICD-10-CM

## 2019-01-16 DIAGNOSIS — Z23 NEED FOR IMMUNIZATION AGAINST INFLUENZA: ICD-10-CM

## 2019-01-16 DIAGNOSIS — E11.9 CONTROLLED TYPE 2 DIABETES MELLITUS WITHOUT COMPLICATION, WITHOUT LONG-TERM CURRENT USE OF INSULIN (HCC): ICD-10-CM

## 2019-01-16 DIAGNOSIS — E66.9 OBESITY (BMI 30-39.9): ICD-10-CM

## 2019-01-16 DIAGNOSIS — E11.9 TYPE 2 DIABETES MELLITUS WITHOUT COMPLICATION, WITHOUT LONG-TERM CURRENT USE OF INSULIN (HCC): ICD-10-CM

## 2019-01-16 DIAGNOSIS — K59.09 OTHER CONSTIPATION: ICD-10-CM

## 2019-01-16 LAB
BASOPHILS ABSOLUTE: 0 K/UL (ref 0–0.2)
BASOPHILS RELATIVE PERCENT: 0.5 %
EOSINOPHILS ABSOLUTE: 0.1 K/UL (ref 0–0.6)
EOSINOPHILS RELATIVE PERCENT: 1.2 %
GLUCOSE BLD-MCNC: 109 MG/DL
HBA1C MFR BLD: 6.3 %
HCT VFR BLD CALC: 33.5 % (ref 36–48)
HEMOGLOBIN: 10.4 G/DL (ref 12–16)
LYMPHOCYTES ABSOLUTE: 2.3 K/UL (ref 1–5.1)
LYMPHOCYTES RELATIVE PERCENT: 38.4 %
MCH RBC QN AUTO: 23.8 PG (ref 26–34)
MCHC RBC AUTO-ENTMCNC: 31.1 G/DL (ref 31–36)
MCV RBC AUTO: 76.4 FL (ref 80–100)
MONOCYTES ABSOLUTE: 0.4 K/UL (ref 0–1.3)
MONOCYTES RELATIVE PERCENT: 6.7 %
NEUTROPHILS ABSOLUTE: 3.2 K/UL (ref 1.7–7.7)
NEUTROPHILS RELATIVE PERCENT: 53.2 %
PDW BLD-RTO: 17.8 % (ref 12.4–15.4)
PLATELET # BLD: 211 K/UL (ref 135–450)
PMV BLD AUTO: 9 FL (ref 5–10.5)
RBC # BLD: 4.38 M/UL (ref 4–5.2)
WBC # BLD: 5.9 K/UL (ref 4–11)

## 2019-01-16 PROCEDURE — G8417 CALC BMI ABV UP PARAM F/U: HCPCS | Performed by: INTERNAL MEDICINE

## 2019-01-16 PROCEDURE — 1036F TOBACCO NON-USER: CPT | Performed by: INTERNAL MEDICINE

## 2019-01-16 PROCEDURE — 1090F PRES/ABSN URINE INCON ASSESS: CPT | Performed by: INTERNAL MEDICINE

## 2019-01-16 PROCEDURE — 83036 HEMOGLOBIN GLYCOSYLATED A1C: CPT | Performed by: INTERNAL MEDICINE

## 2019-01-16 PROCEDURE — G8482 FLU IMMUNIZE ORDER/ADMIN: HCPCS | Performed by: INTERNAL MEDICINE

## 2019-01-16 PROCEDURE — 1101F PT FALLS ASSESS-DOCD LE1/YR: CPT | Performed by: INTERNAL MEDICINE

## 2019-01-16 PROCEDURE — G8510 SCR DEP NEG, NO PLAN REQD: HCPCS | Performed by: INTERNAL MEDICINE

## 2019-01-16 PROCEDURE — 82962 GLUCOSE BLOOD TEST: CPT | Performed by: INTERNAL MEDICINE

## 2019-01-16 PROCEDURE — G0008 ADMIN INFLUENZA VIRUS VAC: HCPCS | Performed by: INTERNAL MEDICINE

## 2019-01-16 PROCEDURE — 1123F ACP DISCUSS/DSCN MKR DOCD: CPT | Performed by: INTERNAL MEDICINE

## 2019-01-16 PROCEDURE — 4040F PNEUMOC VAC/ADMIN/RCVD: CPT | Performed by: INTERNAL MEDICINE

## 2019-01-16 PROCEDURE — G8427 DOCREV CUR MEDS BY ELIG CLIN: HCPCS | Performed by: INTERNAL MEDICINE

## 2019-01-16 PROCEDURE — 99215 OFFICE O/P EST HI 40 MIN: CPT | Performed by: INTERNAL MEDICINE

## 2019-01-16 PROCEDURE — 90662 IIV NO PRSV INCREASED AG IM: CPT | Performed by: INTERNAL MEDICINE

## 2019-01-16 RX ORDER — CETIRIZINE HYDROCHLORIDE 10 MG/1
10 TABLET ORAL DAILY
COMMUNITY

## 2019-01-16 ASSESSMENT — PATIENT HEALTH QUESTIONNAIRE - PHQ9
2. FEELING DOWN, DEPRESSED OR HOPELESS: 0
1. LITTLE INTEREST OR PLEASURE IN DOING THINGS: 0
SUM OF ALL RESPONSES TO PHQ QUESTIONS 1-9: 0
SUM OF ALL RESPONSES TO PHQ QUESTIONS 1-9: 0
SUM OF ALL RESPONSES TO PHQ9 QUESTIONS 1 & 2: 0

## 2019-01-17 LAB
A/G RATIO: 1.4 (ref 1.1–2.2)
ALBUMIN SERPL-MCNC: 4.6 G/DL (ref 3.4–5)
ALP BLD-CCNC: 80 U/L (ref 40–129)
ALT SERPL-CCNC: 11 U/L (ref 10–40)
ANION GAP SERPL CALCULATED.3IONS-SCNC: 13 MMOL/L (ref 3–16)
AST SERPL-CCNC: 22 U/L (ref 15–37)
BILIRUB SERPL-MCNC: <0.2 MG/DL (ref 0–1)
BUN BLDV-MCNC: 17 MG/DL (ref 7–20)
CALCIUM SERPL-MCNC: 9.7 MG/DL (ref 8.3–10.6)
CHLORIDE BLD-SCNC: 101 MMOL/L (ref 99–110)
CHOLESTEROL, TOTAL: 186 MG/DL (ref 0–199)
CO2: 26 MMOL/L (ref 21–32)
CREAT SERPL-MCNC: 1 MG/DL (ref 0.6–1.2)
CREATININE URINE: 189.9 MG/DL (ref 28–259)
FERRITIN: 13.3 NG/ML (ref 15–150)
GFR AFRICAN AMERICAN: >60
GFR NON-AFRICAN AMERICAN: 52
GLOBULIN: 3.2 G/DL
GLUCOSE BLD-MCNC: 104 MG/DL (ref 70–99)
HDLC SERPL-MCNC: 61 MG/DL (ref 40–60)
IRON SATURATION: 8 % (ref 15–50)
IRON: 32 UG/DL (ref 37–145)
LDL CHOLESTEROL CALCULATED: 93 MG/DL
MICROALBUMIN UR-MCNC: 1.9 MG/DL
MICROALBUMIN/CREAT UR-RTO: 10 MG/G (ref 0–30)
POTASSIUM SERPL-SCNC: 4.7 MMOL/L (ref 3.5–5.1)
SODIUM BLD-SCNC: 140 MMOL/L (ref 136–145)
TOTAL IRON BINDING CAPACITY: 418 UG/DL (ref 260–445)
TOTAL PROTEIN: 7.8 G/DL (ref 6.4–8.2)
TRIGL SERPL-MCNC: 162 MG/DL (ref 0–150)
TSH REFLEX: 3.13 UIU/ML (ref 0.27–4.2)
VITAMIN D 25-HYDROXY: 39.8 NG/ML
VLDLC SERPL CALC-MCNC: 32 MG/DL

## 2019-02-04 ENCOUNTER — OFFICE VISIT (OUTPATIENT)
Dept: PULMONOLOGY | Age: 84
End: 2019-02-04
Payer: MEDICARE

## 2019-02-04 VITALS
OXYGEN SATURATION: 98 % | HEIGHT: 63 IN | BODY MASS INDEX: 37.21 KG/M2 | SYSTOLIC BLOOD PRESSURE: 136 MMHG | HEART RATE: 78 BPM | DIASTOLIC BLOOD PRESSURE: 80 MMHG | WEIGHT: 210 LBS

## 2019-02-04 DIAGNOSIS — J45.40 MODERATE PERSISTENT ASTHMA WITHOUT COMPLICATION: Primary | ICD-10-CM

## 2019-02-04 DIAGNOSIS — R05.8 UPPER AIRWAY COUGH SYNDROME: ICD-10-CM

## 2019-02-04 PROCEDURE — 1036F TOBACCO NON-USER: CPT | Performed by: INTERNAL MEDICINE

## 2019-02-04 PROCEDURE — 4040F PNEUMOC VAC/ADMIN/RCVD: CPT | Performed by: INTERNAL MEDICINE

## 2019-02-04 PROCEDURE — 1101F PT FALLS ASSESS-DOCD LE1/YR: CPT | Performed by: INTERNAL MEDICINE

## 2019-02-04 PROCEDURE — G8417 CALC BMI ABV UP PARAM F/U: HCPCS | Performed by: INTERNAL MEDICINE

## 2019-02-04 PROCEDURE — G8427 DOCREV CUR MEDS BY ELIG CLIN: HCPCS | Performed by: INTERNAL MEDICINE

## 2019-02-04 PROCEDURE — G8482 FLU IMMUNIZE ORDER/ADMIN: HCPCS | Performed by: INTERNAL MEDICINE

## 2019-02-04 PROCEDURE — 99214 OFFICE O/P EST MOD 30 MIN: CPT | Performed by: INTERNAL MEDICINE

## 2019-02-04 PROCEDURE — 1123F ACP DISCUSS/DSCN MKR DOCD: CPT | Performed by: INTERNAL MEDICINE

## 2019-02-04 PROCEDURE — 1090F PRES/ABSN URINE INCON ASSESS: CPT | Performed by: INTERNAL MEDICINE

## 2019-02-04 RX ORDER — GABAPENTIN 300 MG/1
300 CAPSULE ORAL 3 TIMES DAILY
COMMUNITY
End: 2019-03-25 | Stop reason: SDUPTHER

## 2019-02-04 RX ORDER — FLUTICASONE FUROATE AND VILANTEROL 100; 25 UG/1; UG/1
1 POWDER RESPIRATORY (INHALATION) DAILY
Qty: 1 EACH | Refills: 11 | Status: SHIPPED | OUTPATIENT
Start: 2019-02-04 | End: 2019-10-03

## 2019-03-21 ENCOUNTER — OFFICE VISIT (OUTPATIENT)
Dept: INTERNAL MEDICINE CLINIC | Age: 84
End: 2019-03-21
Payer: MEDICARE

## 2019-03-21 VITALS
SYSTOLIC BLOOD PRESSURE: 130 MMHG | HEIGHT: 63 IN | HEART RATE: 81 BPM | OXYGEN SATURATION: 92 % | BODY MASS INDEX: 37.21 KG/M2 | WEIGHT: 210 LBS | DIASTOLIC BLOOD PRESSURE: 82 MMHG | TEMPERATURE: 97.9 F

## 2019-03-21 DIAGNOSIS — J45.40 MODERATE PERSISTENT ASTHMA WITHOUT COMPLICATION: ICD-10-CM

## 2019-03-21 DIAGNOSIS — K21.9 GASTROESOPHAGEAL REFLUX DISEASE WITHOUT ESOPHAGITIS: ICD-10-CM

## 2019-03-21 DIAGNOSIS — E78.49 OTHER HYPERLIPIDEMIA: ICD-10-CM

## 2019-03-21 DIAGNOSIS — I10 ESSENTIAL HYPERTENSION: Primary | ICD-10-CM

## 2019-03-21 DIAGNOSIS — E66.9 OBESITY (BMI 30-39.9): ICD-10-CM

## 2019-03-21 DIAGNOSIS — D50.8 OTHER IRON DEFICIENCY ANEMIA: ICD-10-CM

## 2019-03-21 DIAGNOSIS — E11.9 TYPE 2 DIABETES MELLITUS WITHOUT COMPLICATION, WITHOUT LONG-TERM CURRENT USE OF INSULIN (HCC): ICD-10-CM

## 2019-03-21 DIAGNOSIS — K59.09 OTHER CONSTIPATION: ICD-10-CM

## 2019-03-21 PROBLEM — D64.9 ABSOLUTE ANEMIA: Status: ACTIVE | Noted: 2019-03-21

## 2019-03-21 LAB — GLUCOSE BLD-MCNC: 99 MG/DL

## 2019-03-21 PROCEDURE — 1090F PRES/ABSN URINE INCON ASSESS: CPT | Performed by: INTERNAL MEDICINE

## 2019-03-21 PROCEDURE — 1036F TOBACCO NON-USER: CPT | Performed by: INTERNAL MEDICINE

## 2019-03-21 PROCEDURE — G8417 CALC BMI ABV UP PARAM F/U: HCPCS | Performed by: INTERNAL MEDICINE

## 2019-03-21 PROCEDURE — 1123F ACP DISCUSS/DSCN MKR DOCD: CPT | Performed by: INTERNAL MEDICINE

## 2019-03-21 PROCEDURE — G8482 FLU IMMUNIZE ORDER/ADMIN: HCPCS | Performed by: INTERNAL MEDICINE

## 2019-03-21 PROCEDURE — 1101F PT FALLS ASSESS-DOCD LE1/YR: CPT | Performed by: INTERNAL MEDICINE

## 2019-03-21 PROCEDURE — 82962 GLUCOSE BLOOD TEST: CPT | Performed by: INTERNAL MEDICINE

## 2019-03-21 PROCEDURE — 4040F PNEUMOC VAC/ADMIN/RCVD: CPT | Performed by: INTERNAL MEDICINE

## 2019-03-21 PROCEDURE — 99214 OFFICE O/P EST MOD 30 MIN: CPT | Performed by: INTERNAL MEDICINE

## 2019-03-21 PROCEDURE — G8427 DOCREV CUR MEDS BY ELIG CLIN: HCPCS | Performed by: INTERNAL MEDICINE

## 2019-03-21 RX ORDER — DILTIAZEM HYDROCHLORIDE 180 MG/1
CAPSULE, COATED, EXTENDED RELEASE ORAL
Qty: 90 CAPSULE | Refills: 1 | Status: SHIPPED | OUTPATIENT
Start: 2019-03-21 | End: 2019-06-26 | Stop reason: SDUPTHER

## 2019-03-21 RX ORDER — SIMVASTATIN 20 MG
TABLET ORAL
Qty: 90 TABLET | Refills: 1 | Status: SHIPPED | OUTPATIENT
Start: 2019-03-21 | End: 2019-06-26 | Stop reason: SDUPTHER

## 2019-03-21 RX ORDER — OMEPRAZOLE 40 MG/1
CAPSULE, DELAYED RELEASE ORAL
Qty: 90 CAPSULE | Refills: 0 | Status: SHIPPED | OUTPATIENT
Start: 2019-03-21 | End: 2019-05-29 | Stop reason: SDUPTHER

## 2019-03-25 ENCOUNTER — TELEPHONE (OUTPATIENT)
Dept: INTERNAL MEDICINE CLINIC | Age: 84
End: 2019-03-25

## 2019-03-25 RX ORDER — GABAPENTIN 300 MG/1
300 CAPSULE ORAL 3 TIMES DAILY
Qty: 90 CAPSULE | Refills: 0 | Status: SHIPPED | OUTPATIENT
Start: 2019-03-25 | End: 2019-06-10 | Stop reason: SDUPTHER

## 2019-03-25 RX ORDER — FLUTICASONE PROPIONATE 50 MCG
SPRAY, SUSPENSION (ML) NASAL
Qty: 3 BOTTLE | Refills: 0 | Status: SHIPPED | OUTPATIENT
Start: 2019-03-25 | End: 2019-05-29 | Stop reason: SDUPTHER

## 2019-04-10 RX ORDER — DILTIAZEM HYDROCHLORIDE 180 MG/1
CAPSULE, EXTENDED RELEASE ORAL
Qty: 90 CAPSULE | Refills: 0 | Status: SHIPPED | OUTPATIENT
Start: 2019-04-10 | End: 2019-06-26 | Stop reason: SDUPTHER

## 2019-04-29 ENCOUNTER — HOSPITAL ENCOUNTER (OUTPATIENT)
Dept: MAMMOGRAPHY | Age: 84
Discharge: HOME | End: 2019-04-29
Payer: MEDICARE

## 2019-04-29 DIAGNOSIS — Z12.31 VISIT FOR SCREENING MAMMOGRAM: ICD-10-CM

## 2019-04-29 PROCEDURE — 77067 SCR MAMMO BI INCL CAD: CPT

## 2019-05-29 DIAGNOSIS — K21.9 GASTROESOPHAGEAL REFLUX DISEASE WITHOUT ESOPHAGITIS: ICD-10-CM

## 2019-05-29 RX ORDER — OMEPRAZOLE 40 MG/1
CAPSULE, DELAYED RELEASE ORAL
Qty: 90 CAPSULE | Refills: 0 | Status: SHIPPED | OUTPATIENT
Start: 2019-05-29 | End: 2019-06-26 | Stop reason: SDUPTHER

## 2019-05-29 RX ORDER — FLUTICASONE PROPIONATE 50 MCG
SPRAY, SUSPENSION (ML) NASAL
Qty: 1 BOTTLE | Refills: 1 | Status: SHIPPED | OUTPATIENT
Start: 2019-05-29 | End: 2019-12-26 | Stop reason: SDUPTHER

## 2019-06-06 DIAGNOSIS — E11.9 TYPE 2 DIABETES MELLITUS WITHOUT COMPLICATION, WITHOUT LONG-TERM CURRENT USE OF INSULIN (HCC): ICD-10-CM

## 2019-06-06 NOTE — TELEPHONE ENCOUNTER
Sheldon Coleman MD,     LOV: 3/21/19  Next: 6/26/19    Thank you,  Bolivar Gaspar, PharmD, [address] [facility]  Direct: [phone]  Department, toll free: [phone], option 7

## 2019-06-06 NOTE — TELEPHONE ENCOUNTER
CLINICAL PHARMACY: ADHERENCE REVIEW    Identified care gap per [employer] insurance: metformin 500mg adherence  Per records, appears 90-day supply last filled 2/4/19, due 5/5/19    Patient also identified on simvastatin 20mg    Per Ana M at [facility] [phone]  metformin 500mg has not been refilled since 2/4/19      Per Haresh at [facility] [phone]  metformin 500mg was refilled on 5/29/19 for 90DS NR  simvastatin 20mg was refilled on 3/29/19 1RF    Spoke to patient regarding adherence on both the above medications. Denies any skipped or missing doses. Uses a medication organizer. States she knows the number of tablets QAM and QPM she should be taking. Patient requests new rx for metformin 500mg to be sent to [facility] for future. Will route to PharmD to facilitate.

## 2019-06-10 RX ORDER — GABAPENTIN 300 MG/1
300 CAPSULE ORAL 3 TIMES DAILY
Qty: 90 CAPSULE | Refills: 0 | Status: SHIPPED | OUTPATIENT
Start: 2019-06-10 | End: 2019-07-27 | Stop reason: SDUPTHER

## 2019-06-10 NOTE — TELEPHONE ENCOUNTER
For Pharmacy Admin Tracking Only    PHSO: Yes  Total # of Interventions Recommended: 1  - New Order #: 1 New Medication Order Reason(s):  Adherence  - Refills Provided #: 1  - Maintenance Safety Lab Monitoring #: 1  - New Therapy Lab Monitoring #: 1  Recommended intervention potential cost savings: 1  Total Interventions Accepted: 1  Time Spent (min): 15

## 2019-06-26 ENCOUNTER — OFFICE VISIT (OUTPATIENT)
Dept: INTERNAL MEDICINE CLINIC | Age: 84
End: 2019-06-26
Payer: MEDICARE

## 2019-06-26 VITALS
WEIGHT: 206.6 LBS | HEIGHT: 63 IN | SYSTOLIC BLOOD PRESSURE: 130 MMHG | OXYGEN SATURATION: 93 % | BODY MASS INDEX: 36.61 KG/M2 | TEMPERATURE: 97.8 F | HEART RATE: 81 BPM | DIASTOLIC BLOOD PRESSURE: 80 MMHG

## 2019-06-26 DIAGNOSIS — K59.09 CHRONIC CONSTIPATION: ICD-10-CM

## 2019-06-26 DIAGNOSIS — M79.651 PAIN IN BOTH THIGHS: ICD-10-CM

## 2019-06-26 DIAGNOSIS — M79.652 PAIN IN BOTH THIGHS: ICD-10-CM

## 2019-06-26 DIAGNOSIS — E11.9 TYPE 2 DIABETES MELLITUS WITHOUT COMPLICATION, WITHOUT LONG-TERM CURRENT USE OF INSULIN (HCC): ICD-10-CM

## 2019-06-26 DIAGNOSIS — D50.8 OTHER IRON DEFICIENCY ANEMIA: ICD-10-CM

## 2019-06-26 DIAGNOSIS — E11.9 TYPE 2 DIABETES MELLITUS WITHOUT COMPLICATION, WITHOUT LONG-TERM CURRENT USE OF INSULIN (HCC): Primary | ICD-10-CM

## 2019-06-26 DIAGNOSIS — I10 ESSENTIAL HYPERTENSION: ICD-10-CM

## 2019-06-26 DIAGNOSIS — J45.40 MODERATE PERSISTENT ASTHMA WITHOUT COMPLICATION: ICD-10-CM

## 2019-06-26 DIAGNOSIS — K21.9 GASTROESOPHAGEAL REFLUX DISEASE WITHOUT ESOPHAGITIS: ICD-10-CM

## 2019-06-26 DIAGNOSIS — E78.49 OTHER HYPERLIPIDEMIA: ICD-10-CM

## 2019-06-26 LAB
A/G RATIO: 1.5 (ref 1.1–2.2)
ALBUMIN SERPL-MCNC: 4.4 G/DL (ref 3.4–5)
ALP BLD-CCNC: 69 U/L (ref 40–129)
ALT SERPL-CCNC: 9 U/L (ref 10–40)
ANION GAP SERPL CALCULATED.3IONS-SCNC: 13 MMOL/L (ref 3–16)
AST SERPL-CCNC: 20 U/L (ref 15–37)
BASOPHILS ABSOLUTE: 0.1 K/UL (ref 0–0.2)
BASOPHILS RELATIVE PERCENT: 1 %
BILIRUB SERPL-MCNC: <0.2 MG/DL (ref 0–1)
BUN BLDV-MCNC: 25 MG/DL (ref 7–20)
CALCIUM SERPL-MCNC: 9.5 MG/DL (ref 8.3–10.6)
CHLORIDE BLD-SCNC: 105 MMOL/L (ref 99–110)
CHOLESTEROL, TOTAL: 169 MG/DL (ref 0–199)
CHP ED QC CHECK: NORMAL
CO2: 26 MMOL/L (ref 21–32)
CREAT SERPL-MCNC: 1 MG/DL (ref 0.6–1.2)
EOSINOPHILS ABSOLUTE: 0.1 K/UL (ref 0–0.6)
EOSINOPHILS RELATIVE PERCENT: 2.3 %
FERRITIN: 15.9 NG/ML (ref 15–150)
GFR AFRICAN AMERICAN: >60
GFR NON-AFRICAN AMERICAN: 52
GLOBULIN: 3 G/DL
GLUCOSE BLD-MCNC: 116 MG/DL (ref 70–99)
GLUCOSE BLD-MCNC: 136 MG/DL
HBA1C MFR BLD: 6.7 %
HCT VFR BLD CALC: 33.3 % (ref 36–48)
HDLC SERPL-MCNC: 62 MG/DL (ref 40–60)
HEMOGLOBIN: 10.5 G/DL (ref 12–16)
IRON SATURATION: 11 % (ref 15–50)
IRON: 44 UG/DL (ref 37–145)
LDL CHOLESTEROL CALCULATED: 85 MG/DL
LYMPHOCYTES ABSOLUTE: 2.2 K/UL (ref 1–5.1)
LYMPHOCYTES RELATIVE PERCENT: 41.1 %
MAGNESIUM: 2.2 MG/DL (ref 1.8–2.4)
MCH RBC QN AUTO: 24.2 PG (ref 26–34)
MCHC RBC AUTO-ENTMCNC: 31.4 G/DL (ref 31–36)
MCV RBC AUTO: 77.1 FL (ref 80–100)
MONOCYTES ABSOLUTE: 0.3 K/UL (ref 0–1.3)
MONOCYTES RELATIVE PERCENT: 6.3 %
NEUTROPHILS ABSOLUTE: 2.6 K/UL (ref 1.7–7.7)
NEUTROPHILS RELATIVE PERCENT: 49.3 %
PDW BLD-RTO: 19.2 % (ref 12.4–15.4)
PLATELET # BLD: 203 K/UL (ref 135–450)
PMV BLD AUTO: 9.6 FL (ref 5–10.5)
POTASSIUM SERPL-SCNC: 5.3 MMOL/L (ref 3.5–5.1)
RBC # BLD: 4.32 M/UL (ref 4–5.2)
SODIUM BLD-SCNC: 144 MMOL/L (ref 136–145)
TOTAL CK: 59 U/L (ref 26–192)
TOTAL IRON BINDING CAPACITY: 415 UG/DL (ref 260–445)
TOTAL PROTEIN: 7.4 G/DL (ref 6.4–8.2)
TRIGL SERPL-MCNC: 110 MG/DL (ref 0–150)
VLDLC SERPL CALC-MCNC: 22 MG/DL
WBC # BLD: 5.3 K/UL (ref 4–11)

## 2019-06-26 PROCEDURE — G8427 DOCREV CUR MEDS BY ELIG CLIN: HCPCS | Performed by: INTERNAL MEDICINE

## 2019-06-26 PROCEDURE — 82962 GLUCOSE BLOOD TEST: CPT | Performed by: INTERNAL MEDICINE

## 2019-06-26 PROCEDURE — 1123F ACP DISCUSS/DSCN MKR DOCD: CPT | Performed by: INTERNAL MEDICINE

## 2019-06-26 PROCEDURE — 83036 HEMOGLOBIN GLYCOSYLATED A1C: CPT | Performed by: INTERNAL MEDICINE

## 2019-06-26 PROCEDURE — G8417 CALC BMI ABV UP PARAM F/U: HCPCS | Performed by: INTERNAL MEDICINE

## 2019-06-26 PROCEDURE — 99214 OFFICE O/P EST MOD 30 MIN: CPT | Performed by: INTERNAL MEDICINE

## 2019-06-26 PROCEDURE — 1090F PRES/ABSN URINE INCON ASSESS: CPT | Performed by: INTERNAL MEDICINE

## 2019-06-26 PROCEDURE — 1036F TOBACCO NON-USER: CPT | Performed by: INTERNAL MEDICINE

## 2019-06-26 PROCEDURE — 4040F PNEUMOC VAC/ADMIN/RCVD: CPT | Performed by: INTERNAL MEDICINE

## 2019-06-26 RX ORDER — OMEPRAZOLE 40 MG/1
40 CAPSULE, DELAYED RELEASE ORAL DAILY
Qty: 90 CAPSULE | Refills: 0 | Status: SHIPPED | OUTPATIENT
Start: 2019-06-26 | End: 2019-08-14 | Stop reason: SDUPTHER

## 2019-06-26 RX ORDER — SIMVASTATIN 20 MG
TABLET ORAL
Qty: 90 TABLET | Refills: 1 | Status: SHIPPED | OUTPATIENT
Start: 2019-06-26 | End: 2020-02-06 | Stop reason: SDUPTHER

## 2019-06-26 RX ORDER — DILTIAZEM HYDROCHLORIDE 180 MG/1
CAPSULE, COATED, EXTENDED RELEASE ORAL
Qty: 90 CAPSULE | Refills: 0 | Status: SHIPPED | OUTPATIENT
Start: 2019-06-26 | End: 2019-08-14 | Stop reason: SDUPTHER

## 2019-06-26 RX ORDER — DOCUSATE SODIUM 100 MG/1
100 CAPSULE, LIQUID FILLED ORAL 2 TIMES DAILY PRN
Qty: 60 CAPSULE | Refills: 3 | Status: SHIPPED | OUTPATIENT
Start: 2019-06-26 | End: 2019-12-23

## 2019-06-26 NOTE — PROGRESS NOTES
100-25 MCG/INH AEPB inhaler Inhale 1 puff into the lungs daily Yes Charanjit Gaspar MD   cetirizine (ZYRTEC) 10 MG tablet Take 10 mg by mouth daily Yes Historical Provider, MD   Cyanocobalamin (B-12) 2500 MCG SUBL Place 2,500 mg under the tongue daily Yes Historical Provider, MD   aspirin 81 MG tablet Take 81 mg by mouth daily Yes Historical Provider, MD   Fluticasone Furoate-Vilanterol 200-25 MCG/INH AEPB Inhale 1 puff into the lungs daily Yes Charanjit Gaspar MD   albuterol sulfate HFA (PROVENTIL HFA) 108 (90 Base) MCG/ACT inhaler Inhale 2 puffs into the lungs every 4 hours as needed for Wheezing or Shortness of Breath Yes Charanjit Gaspar MD   montelukast (SINGULAIR) 10 MG tablet TAKE 1 TABLET BY MOUTH DAILY FOR ALLERGIES Yes Nga Gill MD   Lift Chair MISC by Does not apply route Yes Nga Gill MD   Iron-Vitamins (GERITOL PO) Take 1 tablet by mouth daily. Yes Historical Provider, MD   multivitamin (ANTIOXIDANT;PROSIGHT) TABS per tablet Take 1 tablet by mouth daily. Yes Historical Provider, MD       ROS: COMPREHENSIVE ROS AS IN HX, REST -VE  History obtained from chart review and the patient    OBJECTIVE:   NURSING NOTE AND VITALS REVIEWED  /88 (Site: Left Upper Arm, Position: Sitting, Cuff Size: Large Adult)   Pulse 81   Temp 97.8 °F (36.6 °C)   Ht 5' 4\" (1.626 m)   Wt 206 lb 9.6 oz (93.7 kg)   SpO2 93%   Breastfeeding? No   BMI 35.46 kg/m²     NO ACUTE DISTRESS    REPEAT BP: 130/80 (LT), NO ORTHOSTASIS     Body mass index is 36.6 kg/m². HEENT: NO PALLOR, ANICTERIC, PERRLA, EOMI, NO CONJUNCTIVAL ERYTHEMA,                 NO SINUS TENDERNESS  NECK:  SUPPLE, TRACHEA MIDLINE, NT, NO JVD, NO CB, NO LA, NO TM, NO STIFFNESS  CHEST: RESPY EFFORT NL, GOOD AE, NO W/R/C  HEART: S1S2+ REG, NO M/G/R  ABD: OBESE, SOFT, NT, NO HSM, BS+  EXT: TRACE EDEMA, NT, PULSES +.  DUNIA'S -VE  NEURO: ALERT AND ORIENTED X 3, NO MENINGEAL SIGNS, NO TREMORS, AMBULATING WITH A WALKER OTHERWISE, NO FOCAL DEFICITS  PSYCH: FAIRLY GOOD AFFECT  BACK: NT, NO ROM, NO CVA TENDERNESS  HIPS: NT, NO ROM  THIGHS: NO LOCALIZED TENDERNESS, NO ERYTHEMA, NO WARMTH      PREVIOUS LABS REVIEWED AND D/W PT     ACCUCHECK: 136    POC HBA1C: 6.7      ASSESSMENT / PLAN:     Diagnosis Orders   1. Type 2 diabetes mellitus without complication, without long-term current use of insulin ([facility] [address]  COUNSELLED. CONTINUE MED  ADVISED DIET/ EXERCISE. MONITOR. GOAL FBS 80- 120, HBA1C < 7  ADVISED ON HOME BLOOD SUGAR MONITORING  F/U LABS  MAKE CHANGES AS NEEDED. 2. Essential hypertension  COUNSELLED. CONTINUE MED. ADVISED LOW NA+ / DASH DIET/ EXERCISE. MONITOR. GOAL </= 120/80  F/U LABS  MAKE CHANGES AS NEEDED. 3. Other hyperlipidemia  COUNSELLED. ADVISED LOW FAT / CHOL DIET/ EXERCISE.  MONITOR ON ZOCOR. F/U LABS  GOALS D/W PT.  MAKE CHANGES AS NEEDED. 4. Moderate persistent asthma without complication  COUNSELLED. CONTINUE INHALERS. MONITOR FOR TRIGGERS- ENVIRONMENTAL MODIFICATION. MAKE CHANGES AS NEEDED. 5. Pain in both thighs  COUNSELLED. MONITOR FOR REFERRED PAIN. EXERCISES. ANALGESICS PRN. MONITOR. Pets are family too diclofenac sodium 1 % GEL PRN  HOME EXERCISES  F/U IF NOT RESOLVED  MAKE CHANGES AS NEEDED. 6. Other iron deficiency anemia  COUNSELLED. MONITOR ON MED. F/U LABS TO [facility]  MAKE CHANGES AS NEEDED. 7. Gastroesophageal reflux disease without esophagitis  COUNSELLED. CONTINUE MGT. ANTIREFLUX PRECAUTIONS ADVISED. MONITOR. MAG LEVEL WITH LABS  MAKE CHANGES AS NEEDED. 8. Chronic constipation  COUNSELLED. START ON COLACE 100 MG BID / PRN  ADVISED SYMPTOMATIC RX. WARM PRUNE JUICE PRN. DIETARY MODIFICATION. MAKE CHANGES AS NEEDED.            DEFERRED SHINGRIX - READDRESS      Hugo received counseling on the following healthy behaviors: nutrition, exercise and medication adherence    Patient given educational materials on Diabetes, Hyperlipidemia, Nutrition, Exercise and Hypertension    I have instructed Hugo to complete a self tracking handout on Blood Sugars , Blood Pressures  and Weights and instructed them to bring it with them to her next appointment. Discussed use, benefit, and side effects of prescribed medications. Barriers to medication compliance addressed. All patient questions answered. Pt voiced understanding.              MEDICATION SIDE EFFECTS D/W PATIENT    PT STABLE AT TIME OF D/C.    RETURN TO CLINIC WITHIN 3 MONTHS / PRN    FOLLOW UP FOR FASTING LABS

## 2019-07-27 ENCOUNTER — TELEPHONE (OUTPATIENT)
Dept: INTERNAL MEDICINE CLINIC | Age: 84
End: 2019-07-27

## 2019-07-29 RX ORDER — GABAPENTIN 300 MG/1
CAPSULE ORAL
Qty: 90 CAPSULE | Refills: 0 | Status: SHIPPED | OUTPATIENT
Start: 2019-07-29 | End: 2020-02-06

## 2019-07-31 ENCOUNTER — OFFICE VISIT (OUTPATIENT)
Dept: INTERNAL MEDICINE CLINIC | Age: 84
End: 2019-07-31
Payer: MEDICARE

## 2019-07-31 VITALS
TEMPERATURE: 97.9 F | WEIGHT: 206.2 LBS | HEART RATE: 82 BPM | DIASTOLIC BLOOD PRESSURE: 80 MMHG | BODY MASS INDEX: 36.54 KG/M2 | SYSTOLIC BLOOD PRESSURE: 130 MMHG | HEIGHT: 63 IN | OXYGEN SATURATION: 95 %

## 2019-07-31 DIAGNOSIS — I10 ESSENTIAL HYPERTENSION: ICD-10-CM

## 2019-07-31 DIAGNOSIS — R63.0 LOSS OF APPETITE: ICD-10-CM

## 2019-07-31 DIAGNOSIS — R53.83 OTHER FATIGUE: ICD-10-CM

## 2019-07-31 DIAGNOSIS — D50.8 OTHER IRON DEFICIENCY ANEMIA: ICD-10-CM

## 2019-07-31 DIAGNOSIS — E11.9 TYPE 2 DIABETES MELLITUS WITHOUT COMPLICATION, WITHOUT LONG-TERM CURRENT USE OF INSULIN (HCC): ICD-10-CM

## 2019-07-31 DIAGNOSIS — J45.40 MODERATE PERSISTENT ASTHMA WITHOUT COMPLICATION: ICD-10-CM

## 2019-07-31 DIAGNOSIS — E78.49 OTHER HYPERLIPIDEMIA: ICD-10-CM

## 2019-07-31 DIAGNOSIS — R35.0 URINARY FREQUENCY: ICD-10-CM

## 2019-07-31 DIAGNOSIS — M79.652 PAIN IN BOTH THIGHS: ICD-10-CM

## 2019-07-31 DIAGNOSIS — K21.9 GASTROESOPHAGEAL REFLUX DISEASE WITHOUT ESOPHAGITIS: ICD-10-CM

## 2019-07-31 DIAGNOSIS — R42 DIZZINESS: Primary | ICD-10-CM

## 2019-07-31 DIAGNOSIS — M79.651 PAIN IN BOTH THIGHS: ICD-10-CM

## 2019-07-31 DIAGNOSIS — M25.551 RIGHT HIP PAIN: ICD-10-CM

## 2019-07-31 LAB
ANION GAP SERPL CALCULATED.3IONS-SCNC: 16 MMOL/L (ref 3–16)
BASOPHILS ABSOLUTE: 0 K/UL (ref 0–0.2)
BASOPHILS RELATIVE PERCENT: 0.8 %
BILIRUBIN, POC: NORMAL
BLOOD URINE, POC: NORMAL
BUN BLDV-MCNC: 23 MG/DL (ref 7–20)
CALCIUM SERPL-MCNC: 9.7 MG/DL (ref 8.3–10.6)
CHLORIDE BLD-SCNC: 102 MMOL/L (ref 99–110)
CHP ED QC CHECK: NORMAL
CLARITY, POC: NORMAL
CO2: 24 MMOL/L (ref 21–32)
COLOR, POC: YELLOW
CREAT SERPL-MCNC: 1 MG/DL (ref 0.6–1.2)
EOSINOPHILS ABSOLUTE: 0.1 K/UL (ref 0–0.6)
EOSINOPHILS RELATIVE PERCENT: 2.1 %
FOLATE: >20 NG/ML (ref 4.78–24.2)
GFR AFRICAN AMERICAN: >60
GFR NON-AFRICAN AMERICAN: 52
GLUCOSE BLD-MCNC: 120 MG/DL (ref 70–99)
GLUCOSE BLD-MCNC: 134 MG/DL
GLUCOSE URINE, POC: NORMAL
HCT VFR BLD CALC: 31.3 % (ref 36–48)
HEMOGLOBIN: 10 G/DL (ref 12–16)
KETONES, POC: NORMAL
LEUKOCYTE EST, POC: NORMAL
LYMPHOCYTES ABSOLUTE: 2 K/UL (ref 1–5.1)
LYMPHOCYTES RELATIVE PERCENT: 37.5 %
MCH RBC QN AUTO: 24.5 PG (ref 26–34)
MCHC RBC AUTO-ENTMCNC: 32 G/DL (ref 31–36)
MCV RBC AUTO: 76.6 FL (ref 80–100)
MONOCYTES ABSOLUTE: 0.4 K/UL (ref 0–1.3)
MONOCYTES RELATIVE PERCENT: 7.3 %
NEUTROPHILS ABSOLUTE: 2.8 K/UL (ref 1.7–7.7)
NEUTROPHILS RELATIVE PERCENT: 52.3 %
NITRITE, POC: NORMAL
PDW BLD-RTO: 18.2 % (ref 12.4–15.4)
PH, POC: 6
PLATELET # BLD: 214 K/UL (ref 135–450)
PMV BLD AUTO: 8.9 FL (ref 5–10.5)
POTASSIUM SERPL-SCNC: 4.3 MMOL/L (ref 3.5–5.1)
PROTEIN, POC: 30
RBC # BLD: 4.08 M/UL (ref 4–5.2)
SODIUM BLD-SCNC: 142 MMOL/L (ref 136–145)
SPECIFIC GRAVITY, POC: 1.02
T4 FREE: 1.2 NG/DL (ref 0.9–1.8)
TSH REFLEX: 4.22 UIU/ML (ref 0.27–4.2)
UROBILINOGEN, POC: 1
VITAMIN B-12: 1846 PG/ML (ref 211–911)
WBC # BLD: 5.3 K/UL (ref 4–11)

## 2019-07-31 PROCEDURE — 1036F TOBACCO NON-USER: CPT | Performed by: INTERNAL MEDICINE

## 2019-07-31 PROCEDURE — 1090F PRES/ABSN URINE INCON ASSESS: CPT | Performed by: INTERNAL MEDICINE

## 2019-07-31 PROCEDURE — 4040F PNEUMOC VAC/ADMIN/RCVD: CPT | Performed by: INTERNAL MEDICINE

## 2019-07-31 PROCEDURE — G8427 DOCREV CUR MEDS BY ELIG CLIN: HCPCS | Performed by: INTERNAL MEDICINE

## 2019-07-31 PROCEDURE — 82962 GLUCOSE BLOOD TEST: CPT | Performed by: INTERNAL MEDICINE

## 2019-07-31 PROCEDURE — 81002 URINALYSIS NONAUTO W/O SCOPE: CPT | Performed by: INTERNAL MEDICINE

## 2019-07-31 PROCEDURE — 1123F ACP DISCUSS/DSCN MKR DOCD: CPT | Performed by: INTERNAL MEDICINE

## 2019-07-31 PROCEDURE — G8417 CALC BMI ABV UP PARAM F/U: HCPCS | Performed by: INTERNAL MEDICINE

## 2019-07-31 PROCEDURE — 99214 OFFICE O/P EST MOD 30 MIN: CPT | Performed by: INTERNAL MEDICINE

## 2019-07-31 RX ORDER — MECLIZINE HYDROCHLORIDE 25 MG/1
25 TABLET ORAL 3 TIMES DAILY PRN
Qty: 30 TABLET | Refills: 0 | Status: SHIPPED | OUTPATIENT
Start: 2019-07-31 | End: 2019-09-10 | Stop reason: SDUPTHER

## 2019-07-31 NOTE — PROGRESS NOTES
Jose Jackson MD   omeprazole (PRILOSEC) 40 MG delayed release capsule Take 1 capsule by mouth daily Yes Yin Lerner MD   diltiazem (CARTIA XT) 180 MG extended release capsule TAKE 1 CAPSULE BY MOUTH DAILY Yes Yin Lerner MD   simvastatin (ZOCOR) 20 MG tablet TAKE 1 TABLET BY MOUTH EVERY NIGHT AT BEDTIME Yes Yin Lerner MD   metFORMIN (GLUCOPHAGE) 500 MG tablet TAKE 1 TABLET BY MOUTH TWICE DAILY WITH MEALS Yes Yin Lerner MD   fluticasone (FLONASE) 50 MCG/ACT nasal spray SHAKE LIQUID AND USE 2  SPRAYS IN EACH NOSTRIL  DAILY Yes Yin Lerner MD   fluticasone-vilanterol (BREO ELLIPTA) 100-25 MCG/INH AEPB inhaler Inhale 1 puff into the lungs daily Yes Yvonne Danielson MD   cetirizine (ZYRTEC) 10 MG tablet Take 10 mg by mouth daily Yes Historical Provider, MD   Cyanocobalamin (B-12) 2500 MCG SUBL Place 2,500 mg under the tongue daily Yes Historical Provider, MD   aspirin 81 MG tablet Take 81 mg by mouth daily Yes Historical Provider, MD   Fluticasone Furoate-Vilanterol 200-25 MCG/INH AEPB Inhale 1 puff into the lungs daily Yes Yvonne Danielson MD   albuterol sulfate HFA (PROVENTIL HFA) 108 (90 Base) MCG/ACT inhaler Inhale 2 puffs into the lungs every 4 hours as needed for Wheezing or Shortness of Breath Yes Yvonne Danielson MD   montelukast (SINGULAIR) 10 MG tablet TAKE 1 TABLET BY MOUTH DAILY FOR ALLERGIES Yes Cheli Argueta MD   Lift Chair MISC by Does not apply route Yes Cheli Argueta MD   Iron-Vitamins (GERITOL PO) Take 1 tablet by mouth daily. Yes Historical Provider, MD   multivitamin (ANTIOXIDANT;PROSIGHT) TABS per tablet Take 1 tablet by mouth daily.    Yes Historical Provider, MD       ROS: COMPREHENSIVE ROS AS IN HX, REST -VE  History obtained from chart review and the patient    OBJECTIVE:   NURSING NOTE AND VITALS REVIEWED  /82 (Site: Left Upper Arm, Position: Sitting, Cuff Size: Large Adult)   Pulse 82   Temp 97.9 °F (36.6 °C)   Ht 5' 3\" (1.6 m)   Wt 206 lb 3.2 oz (93.5 kg)   SpO2 95%

## 2019-08-01 ENCOUNTER — TELEPHONE (OUTPATIENT)
Dept: INTERNAL MEDICINE CLINIC | Age: 84
End: 2019-08-01

## 2019-08-01 ENCOUNTER — HOSPITAL ENCOUNTER (OUTPATIENT)
Dept: GENERAL RADIOLOGY | Age: 84
Discharge: HOME | End: 2019-08-01
Payer: MEDICARE

## 2019-08-01 ENCOUNTER — HOSPITAL ENCOUNTER (OUTPATIENT)
Age: 84
Discharge: HOME | End: 2019-08-01
Payer: MEDICARE

## 2019-08-01 DIAGNOSIS — M79.651 PAIN IN BOTH THIGHS: ICD-10-CM

## 2019-08-01 DIAGNOSIS — M79.652 PAIN IN BOTH THIGHS: ICD-10-CM

## 2019-08-01 DIAGNOSIS — M25.551 RIGHT HIP PAIN: ICD-10-CM

## 2019-08-01 PROCEDURE — 73502 X-RAY EXAM HIP UNI 2-3 VIEWS: CPT

## 2019-08-05 ENCOUNTER — TELEPHONE (OUTPATIENT)
Dept: INTERNAL MEDICINE CLINIC | Age: 84
End: 2019-08-05

## 2019-08-14 ENCOUNTER — TELEPHONE (OUTPATIENT)
Dept: INTERNAL MEDICINE CLINIC | Age: 84
End: 2019-08-14

## 2019-08-14 DIAGNOSIS — K21.9 GASTROESOPHAGEAL REFLUX DISEASE WITHOUT ESOPHAGITIS: ICD-10-CM

## 2019-08-14 DIAGNOSIS — I10 ESSENTIAL HYPERTENSION: ICD-10-CM

## 2019-08-15 RX ORDER — DILTIAZEM HYDROCHLORIDE 180 MG/1
CAPSULE, COATED, EXTENDED RELEASE ORAL
Qty: 90 CAPSULE | Refills: 0 | Status: SHIPPED | OUTPATIENT
Start: 2019-08-15 | End: 2019-10-29 | Stop reason: SDUPTHER

## 2019-08-15 RX ORDER — OMEPRAZOLE 40 MG/1
40 CAPSULE, DELAYED RELEASE ORAL DAILY
Qty: 90 CAPSULE | Refills: 0 | Status: SHIPPED | OUTPATIENT
Start: 2019-08-15 | End: 2019-10-29 | Stop reason: SDUPTHER

## 2019-08-22 ENCOUNTER — OFFICE VISIT (OUTPATIENT)
Dept: PULMONOLOGY | Age: 84
End: 2019-08-22
Payer: MEDICARE

## 2019-08-22 VITALS
WEIGHT: 204.6 LBS | OXYGEN SATURATION: 98 % | SYSTOLIC BLOOD PRESSURE: 128 MMHG | HEIGHT: 63 IN | BODY MASS INDEX: 36.25 KG/M2 | HEART RATE: 74 BPM | DIASTOLIC BLOOD PRESSURE: 72 MMHG

## 2019-08-22 DIAGNOSIS — J45.20 ASTHMA, MILD INTERMITTENT, WELL-CONTROLLED: Primary | ICD-10-CM

## 2019-08-22 PROCEDURE — G8427 DOCREV CUR MEDS BY ELIG CLIN: HCPCS | Performed by: INTERNAL MEDICINE

## 2019-08-22 PROCEDURE — 99214 OFFICE O/P EST MOD 30 MIN: CPT | Performed by: INTERNAL MEDICINE

## 2019-08-22 PROCEDURE — 1090F PRES/ABSN URINE INCON ASSESS: CPT | Performed by: INTERNAL MEDICINE

## 2019-08-22 PROCEDURE — 4040F PNEUMOC VAC/ADMIN/RCVD: CPT | Performed by: INTERNAL MEDICINE

## 2019-08-22 PROCEDURE — G8417 CALC BMI ABV UP PARAM F/U: HCPCS | Performed by: INTERNAL MEDICINE

## 2019-08-22 PROCEDURE — 1123F ACP DISCUSS/DSCN MKR DOCD: CPT | Performed by: INTERNAL MEDICINE

## 2019-08-22 PROCEDURE — 1036F TOBACCO NON-USER: CPT | Performed by: INTERNAL MEDICINE

## 2019-09-10 ENCOUNTER — OFFICE VISIT (OUTPATIENT)
Dept: INTERNAL MEDICINE CLINIC | Age: 84
End: 2019-09-10
Payer: MEDICARE

## 2019-09-10 VITALS
TEMPERATURE: 98 F | SYSTOLIC BLOOD PRESSURE: 138 MMHG | OXYGEN SATURATION: 97 % | DIASTOLIC BLOOD PRESSURE: 80 MMHG | BODY MASS INDEX: 35.79 KG/M2 | HEIGHT: 63 IN | WEIGHT: 202 LBS | HEART RATE: 74 BPM

## 2019-09-10 DIAGNOSIS — E11.9 TYPE 2 DIABETES MELLITUS WITHOUT COMPLICATION, WITHOUT LONG-TERM CURRENT USE OF INSULIN (HCC): ICD-10-CM

## 2019-09-10 DIAGNOSIS — K21.9 GASTROESOPHAGEAL REFLUX DISEASE WITHOUT ESOPHAGITIS: ICD-10-CM

## 2019-09-10 DIAGNOSIS — Z23 NEED FOR IMMUNIZATION AGAINST INFLUENZA: ICD-10-CM

## 2019-09-10 DIAGNOSIS — E78.49 OTHER HYPERLIPIDEMIA: ICD-10-CM

## 2019-09-10 DIAGNOSIS — M25.551 RIGHT HIP PAIN: ICD-10-CM

## 2019-09-10 DIAGNOSIS — E03.9 ACQUIRED HYPOTHYROIDISM: Primary | ICD-10-CM

## 2019-09-10 DIAGNOSIS — J45.40 MODERATE PERSISTENT ASTHMA WITHOUT COMPLICATION: ICD-10-CM

## 2019-09-10 DIAGNOSIS — R42 DIZZINESS: ICD-10-CM

## 2019-09-10 DIAGNOSIS — D64.89 ANEMIA DUE TO OTHER CAUSE, NOT CLASSIFIED: ICD-10-CM

## 2019-09-10 DIAGNOSIS — I10 ESSENTIAL HYPERTENSION: ICD-10-CM

## 2019-09-10 LAB
CHP ED QC CHECK: NORMAL
GLUCOSE BLD-MCNC: 106 MG/DL

## 2019-09-10 PROCEDURE — 1036F TOBACCO NON-USER: CPT | Performed by: INTERNAL MEDICINE

## 2019-09-10 PROCEDURE — 1090F PRES/ABSN URINE INCON ASSESS: CPT | Performed by: INTERNAL MEDICINE

## 2019-09-10 PROCEDURE — 4040F PNEUMOC VAC/ADMIN/RCVD: CPT | Performed by: INTERNAL MEDICINE

## 2019-09-10 PROCEDURE — G0008 ADMIN INFLUENZA VIRUS VAC: HCPCS | Performed by: INTERNAL MEDICINE

## 2019-09-10 PROCEDURE — 82962 GLUCOSE BLOOD TEST: CPT | Performed by: INTERNAL MEDICINE

## 2019-09-10 PROCEDURE — 90653 IIV ADJUVANT VACCINE IM: CPT | Performed by: INTERNAL MEDICINE

## 2019-09-10 PROCEDURE — G8427 DOCREV CUR MEDS BY ELIG CLIN: HCPCS | Performed by: INTERNAL MEDICINE

## 2019-09-10 PROCEDURE — 1123F ACP DISCUSS/DSCN MKR DOCD: CPT | Performed by: INTERNAL MEDICINE

## 2019-09-10 PROCEDURE — 99214 OFFICE O/P EST MOD 30 MIN: CPT | Performed by: INTERNAL MEDICINE

## 2019-09-10 PROCEDURE — G8417 CALC BMI ABV UP PARAM F/U: HCPCS | Performed by: INTERNAL MEDICINE

## 2019-09-10 RX ORDER — LEVOTHYROXINE SODIUM 0.03 MG/1
25 TABLET ORAL
Qty: 30 TABLET | Refills: 3 | Status: SHIPPED | OUTPATIENT
Start: 2019-09-10 | End: 2020-01-21

## 2019-09-10 RX ORDER — MECLIZINE HYDROCHLORIDE 25 MG/1
25 TABLET ORAL 3 TIMES DAILY PRN
Qty: 30 TABLET | Refills: 0 | Status: SHIPPED | OUTPATIENT
Start: 2019-09-10 | End: 2021-03-03 | Stop reason: ALTCHOICE

## 2019-09-12 ENCOUNTER — TELEPHONE (OUTPATIENT)
Dept: PHARMACY | Facility: CLINIC | Age: 84
End: 2019-09-12

## 2019-09-12 NOTE — TELEPHONE ENCOUNTER
CLINICAL PHARMACY: ADHERENCE REVIEW    Identified care gap per [employer]: metformin adherence  Per records, appears 90-day supply last filled 5/29/19. Per [facility]:   Metformin last filled on 9/5/19 for a 90-day supply billed through the patient's [employer] insurance. There is 1 refill remaining. No patient outreach planned at this time. Juan Carlos Liz, PharmD, [id number] [facility]  Direct: [phone]  Department, toll free [phone], option 7          For Pharmacy Admin Tracking Only    PHSO: Yes  Total # of Interventions Recommended: 0  - New Order #: 0 New Medication Order Reason(s):  Adherence  - Maintenance Safety Lab Monitoring #: 1  Recommended intervention potential cost savings: 1  Total Interventions Accepted: 0  Time Spent (min): 5

## 2019-09-25 ENCOUNTER — HOSPITAL ENCOUNTER (OUTPATIENT)
Dept: PHYSICAL THERAPY | Age: 84
Setting detail: THERAPIES SERIES
Discharge: HOME | End: 2019-09-25
Payer: MEDICARE

## 2019-09-25 PROCEDURE — 97116 GAIT TRAINING THERAPY: CPT

## 2019-09-25 PROCEDURE — 97110 THERAPEUTIC EXERCISES: CPT

## 2019-09-25 PROCEDURE — 97161 PT EVAL LOW COMPLEX 20 MIN: CPT

## 2019-09-25 ASSESSMENT — PAIN DESCRIPTION - FREQUENCY: FREQUENCY: INTERMITTENT

## 2019-09-25 ASSESSMENT — PAIN DESCRIPTION - LOCATION: LOCATION: HIP

## 2019-09-25 ASSESSMENT — PAIN DESCRIPTION - PAIN TYPE: TYPE: CHRONIC PAIN

## 2019-09-25 ASSESSMENT — PAIN DESCRIPTION - DESCRIPTORS: DESCRIPTORS: NUMBNESS

## 2019-09-25 ASSESSMENT — PAIN DESCRIPTION - ORIENTATION: ORIENTATION: RIGHT

## 2019-09-25 ASSESSMENT — PAIN SCALES - GENERAL: PAINLEVEL_OUTOF10: 9

## 2019-09-25 NOTE — FLOWSHEET NOTE
Treatment Minutes:  60 (20 eval + 8 gait, 32 TE)    Treatment/Activity Tolerance:  [] Patient tolerated treatment well [] Patient limited by fatigue  [x] Patient limited by pain  [] Patient limited by other medical complications  [] Other:     Assessment:   IADL History  Homemaking Responsibilities: Yes  Active : No  Mode of Transportation: Bus  Occupation: Retired  Leisure & Hobbies: Bingo, senior breakfasts  Conditions Requiring Skilled Therapeutic Intervention  Body structures, Functions, Activity limitations: Decreased functional mobility , Decreased endurance, Decreased ROM, Decreased balance, Decreased strength, Increased pain  Assessment: Pt presents to PT clinic this date with c/o R hip pain radiating down to R ankle. Pt reports increased pain with ambulation and decreased pain in sitting. Pt presents with weakness to B hip musculature, as well as decreased R knee flexion and DF strength. Pt also presents with increased tightness to R HS, and decreased R hip flexion ROM. Feel pt will benefit from skilled physical therapy services at this time to address above limitations and return to PLOF. Treatment Diagnosis: R hip pain  Prognosis: Good  Decision Making: Low Complexity  REQUIRES PT FOLLOW UP: Yes  Treatment Initiated : 2x/week for 8 weeks      Prognosis: [x] Good [] Fair  [] Poor    Patient Requires Follow-up: [] Yes  [x] No    Goals:  Short term goals  Time Frame for Short term goals: In 4 weeks, pt will  Short term goal 1: Improve TUG to <20 sec with 4WW  Short term goal 2: Improve R hip flexion strength to 4/5  Short term goal 3: Improve R knee flexion strength to 5/5  Short term goal 4: Improve B hip abd strength to 4/5  Short term goal 5: Improve R HS 90/90 flexibility to <5 degrees from neutral  Long term goals  Time Frame for Long term goals :  In 8 weeks, pt will  Long term goal 1: Improve TUG to <15 sec with 4WW  Long term goal 2: Improve R hip flexion strength to 4+/5  Long term goal 3:

## 2019-09-25 NOTE — PROGRESS NOTES
Physical Therapy  Initial Assessment  Date: 2019  Patient Name: Reena Diallo  MRN: [id number]  : 1930     Treatment Diagnosis: R hip pain    Restrictions       Subjective   General  Chart Reviewed: Yes  Patient assessed for rehabilitation services?: Yes  Additional Pertinent Hx: DM2, HTN, B TKA, asthma  Response To Previous Treatment: Not applicable  Family / Caregiver Present: No  Referring Practitioner: Dr. Emelia Vergara  Referral Date : 09/10/19  Diagnosis: M25.551 (ICD-10-CM) - Right hip pain  Follows Commands: Within Functional Limits  General Comment  Comments: Pt is independent individual with intermittent use of SPC that started about 1 year ago. Pt with history of mujltiple falls ~1 year ago. Pt reports increased use of cane and progressing to use of 4WW in past 6 months. Pt has had no falls since use of 4WW. Subjective  Subjective: Pt presents to PT clinic this date with c/o R hip pain. Pt reports pain began about 6 months ago, and has gotten progressively worse in past 6 months. Pt reports increased pain with ambulation, and decreased pain with sitting. Pt has attempted heat, with mild relief. Pt reports pain fluctuates in intensity. Pt reports pain radiates all the way down to calf. Pt also reports some numbness at lateral calf. Pain Screening  Patient Currently in Pain: Yes  Pain Assessment  Pain Assessment: 0-10  Pain Level:  9(walking)  Patient's Stated Pain Goal: No pain  Pain Type: Chronic pain  Pain Location: Hip  Pain Orientation: Right  Pain Radiating Towards: R ankle/calf  Pain Descriptors: Numbness  Pain Frequency: Intermittent  Vital Signs  Patient Currently in Pain: Yes    Vision/Hearing  Vision  Vision: Impaired(bifocals)  Hearing  Hearing: Within functional limits    Orientation  Orientation  Overall Orientation Status: Within Normal Limits    Social/Functional History  Social/Functional History  Type of Home: Apartment  Home Layout: One level  Home Access:

## 2019-09-30 ENCOUNTER — HOSPITAL ENCOUNTER (OUTPATIENT)
Dept: PHYSICAL THERAPY | Age: 84
Setting detail: THERAPIES SERIES
Discharge: HOME | End: 2019-09-30
Payer: MEDICARE

## 2019-09-30 PROCEDURE — 97110 THERAPEUTIC EXERCISES: CPT

## 2019-09-30 PROCEDURE — 97140 MANUAL THERAPY 1/> REGIONS: CPT

## 2019-10-03 ENCOUNTER — HOSPITAL ENCOUNTER (OUTPATIENT)
Dept: PHYSICAL THERAPY | Age: 84
Setting detail: THERAPIES SERIES
Discharge: HOME | End: 2019-10-03
Payer: MEDICARE

## 2019-10-03 ENCOUNTER — TELEPHONE (OUTPATIENT)
Dept: PULMONOLOGY | Age: 84
End: 2019-10-03

## 2019-10-03 PROCEDURE — 97110 THERAPEUTIC EXERCISES: CPT

## 2019-10-03 PROCEDURE — 97140 MANUAL THERAPY 1/> REGIONS: CPT

## 2019-10-03 RX ORDER — BUDESONIDE AND FORMOTEROL FUMARATE DIHYDRATE 160; 4.5 UG/1; UG/1
2 AEROSOL RESPIRATORY (INHALATION) 2 TIMES DAILY
Qty: 1 INHALER | Refills: 6 | Status: SHIPPED | OUTPATIENT
Start: 2019-10-03 | End: 2020-05-14 | Stop reason: ALTCHOICE

## 2019-10-07 ENCOUNTER — HOSPITAL ENCOUNTER (OUTPATIENT)
Dept: PHYSICAL THERAPY | Age: 84
Setting detail: THERAPIES SERIES
Discharge: HOME | End: 2019-10-07
Payer: MEDICARE

## 2019-10-07 PROCEDURE — 97110 THERAPEUTIC EXERCISES: CPT

## 2019-10-07 PROCEDURE — 97140 MANUAL THERAPY 1/> REGIONS: CPT

## 2019-10-09 ENCOUNTER — APPOINTMENT (OUTPATIENT)
Dept: PHYSICAL THERAPY | Age: 84
End: 2019-10-09
Payer: MEDICARE

## 2019-10-14 ENCOUNTER — HOSPITAL ENCOUNTER (OUTPATIENT)
Dept: PHYSICAL THERAPY | Age: 84
Setting detail: THERAPIES SERIES
Discharge: HOME | End: 2019-10-14
Payer: MEDICARE

## 2019-10-14 PROCEDURE — 97110 THERAPEUTIC EXERCISES: CPT

## 2019-10-14 PROCEDURE — 97140 MANUAL THERAPY 1/> REGIONS: CPT

## 2019-10-16 ENCOUNTER — APPOINTMENT (OUTPATIENT)
Dept: PHYSICAL THERAPY | Age: 84
End: 2019-10-16
Payer: MEDICARE

## 2019-10-21 ENCOUNTER — APPOINTMENT (OUTPATIENT)
Dept: PHYSICAL THERAPY | Age: 84
End: 2019-10-21
Payer: MEDICARE

## 2019-10-23 ENCOUNTER — APPOINTMENT (OUTPATIENT)
Dept: PHYSICAL THERAPY | Age: 84
End: 2019-10-23
Payer: MEDICARE

## 2019-10-29 ENCOUNTER — CARE COORDINATION (OUTPATIENT)
Dept: CARE COORDINATION | Age: 84
End: 2019-10-29

## 2019-10-29 ENCOUNTER — OFFICE VISIT (OUTPATIENT)
Dept: INTERNAL MEDICINE CLINIC | Age: 84
End: 2019-10-29
Payer: MEDICARE

## 2019-10-29 VITALS
HEIGHT: 63 IN | TEMPERATURE: 98.5 F | SYSTOLIC BLOOD PRESSURE: 138 MMHG | HEART RATE: 70 BPM | DIASTOLIC BLOOD PRESSURE: 80 MMHG | WEIGHT: 200.4 LBS | OXYGEN SATURATION: 98 % | BODY MASS INDEX: 35.51 KG/M2

## 2019-10-29 DIAGNOSIS — I10 ESSENTIAL HYPERTENSION: ICD-10-CM

## 2019-10-29 DIAGNOSIS — E03.9 ACQUIRED HYPOTHYROIDISM: ICD-10-CM

## 2019-10-29 DIAGNOSIS — J45.40 MODERATE PERSISTENT ASTHMA WITHOUT COMPLICATION: ICD-10-CM

## 2019-10-29 DIAGNOSIS — M25.551 RIGHT HIP PAIN: Primary | ICD-10-CM

## 2019-10-29 DIAGNOSIS — E78.49 OTHER HYPERLIPIDEMIA: ICD-10-CM

## 2019-10-29 DIAGNOSIS — E11.9 TYPE 2 DIABETES MELLITUS WITHOUT COMPLICATION, WITHOUT LONG-TERM CURRENT USE OF INSULIN (HCC): ICD-10-CM

## 2019-10-29 DIAGNOSIS — K21.9 GASTROESOPHAGEAL REFLUX DISEASE WITHOUT ESOPHAGITIS: ICD-10-CM

## 2019-10-29 LAB
A/G RATIO: 1.8 (ref 1.1–2.2)
ALBUMIN SERPL-MCNC: 4.9 G/DL (ref 3.4–5)
ALP BLD-CCNC: 74 U/L (ref 40–129)
ALT SERPL-CCNC: 10 U/L (ref 10–40)
ANION GAP SERPL CALCULATED.3IONS-SCNC: 17 MMOL/L (ref 3–16)
AST SERPL-CCNC: 22 U/L (ref 15–37)
BASOPHILS ABSOLUTE: 0.1 K/UL (ref 0–0.2)
BASOPHILS RELATIVE PERCENT: 1.1 %
BILIRUB SERPL-MCNC: <0.2 MG/DL (ref 0–1)
BUN BLDV-MCNC: 17 MG/DL (ref 7–20)
CALCIUM SERPL-MCNC: 9.5 MG/DL (ref 8.3–10.6)
CHLORIDE BLD-SCNC: 102 MMOL/L (ref 99–110)
CHP ED QC CHECK: ABNORMAL
CO2: 24 MMOL/L (ref 21–32)
CREAT SERPL-MCNC: 1 MG/DL (ref 0.6–1.2)
EOSINOPHILS ABSOLUTE: 0.2 K/UL (ref 0–0.6)
EOSINOPHILS RELATIVE PERCENT: 3 %
GFR AFRICAN AMERICAN: >60
GFR NON-AFRICAN AMERICAN: 52
GLOBULIN: 2.7 G/DL
GLUCOSE BLD-MCNC: 101 MG/DL (ref 70–99)
GLUCOSE BLD-MCNC: 154 MG/DL
HBA1C MFR BLD: 6 %
HCT VFR BLD CALC: 32.7 % (ref 36–48)
HEMOGLOBIN: 10.3 G/DL (ref 12–16)
LYMPHOCYTES ABSOLUTE: 2.7 K/UL (ref 1–5.1)
LYMPHOCYTES RELATIVE PERCENT: 46.4 %
MCH RBC QN AUTO: 24 PG (ref 26–34)
MCHC RBC AUTO-ENTMCNC: 31.5 G/DL (ref 31–36)
MCV RBC AUTO: 76.2 FL (ref 80–100)
MONOCYTES ABSOLUTE: 0.4 K/UL (ref 0–1.3)
MONOCYTES RELATIVE PERCENT: 7.3 %
NEUTROPHILS ABSOLUTE: 2.4 K/UL (ref 1.7–7.7)
NEUTROPHILS RELATIVE PERCENT: 42.2 %
PDW BLD-RTO: 17.8 % (ref 12.4–15.4)
PLATELET # BLD: 205 K/UL (ref 135–450)
PMV BLD AUTO: 9.2 FL (ref 5–10.5)
POTASSIUM SERPL-SCNC: 4.5 MMOL/L (ref 3.5–5.1)
RBC # BLD: 4.29 M/UL (ref 4–5.2)
SODIUM BLD-SCNC: 143 MMOL/L (ref 136–145)
TOTAL PROTEIN: 7.6 G/DL (ref 6.4–8.2)
TSH REFLEX: 2.31 UIU/ML (ref 0.27–4.2)
WBC # BLD: 5.8 K/UL (ref 4–11)

## 2019-10-29 PROCEDURE — 99214 OFFICE O/P EST MOD 30 MIN: CPT | Performed by: INTERNAL MEDICINE

## 2019-10-29 PROCEDURE — 1036F TOBACCO NON-USER: CPT | Performed by: INTERNAL MEDICINE

## 2019-10-29 PROCEDURE — 82962 GLUCOSE BLOOD TEST: CPT | Performed by: INTERNAL MEDICINE

## 2019-10-29 PROCEDURE — 83036 HEMOGLOBIN GLYCOSYLATED A1C: CPT | Performed by: INTERNAL MEDICINE

## 2019-10-29 PROCEDURE — 1123F ACP DISCUSS/DSCN MKR DOCD: CPT | Performed by: INTERNAL MEDICINE

## 2019-10-29 PROCEDURE — G8417 CALC BMI ABV UP PARAM F/U: HCPCS | Performed by: INTERNAL MEDICINE

## 2019-10-29 PROCEDURE — G8427 DOCREV CUR MEDS BY ELIG CLIN: HCPCS | Performed by: INTERNAL MEDICINE

## 2019-10-29 PROCEDURE — 96372 THER/PROPH/DIAG INJ SC/IM: CPT | Performed by: INTERNAL MEDICINE

## 2019-10-29 PROCEDURE — G8482 FLU IMMUNIZE ORDER/ADMIN: HCPCS | Performed by: INTERNAL MEDICINE

## 2019-10-29 PROCEDURE — 4040F PNEUMOC VAC/ADMIN/RCVD: CPT | Performed by: INTERNAL MEDICINE

## 2019-10-29 PROCEDURE — 1090F PRES/ABSN URINE INCON ASSESS: CPT | Performed by: INTERNAL MEDICINE

## 2019-10-29 RX ORDER — NAPROXEN 500 MG/1
500 TABLET ORAL 2 TIMES DAILY PRN
Qty: 60 TABLET | Refills: 0 | Status: SHIPPED | OUTPATIENT
Start: 2019-10-29 | End: 2020-02-06 | Stop reason: ALTCHOICE

## 2019-10-29 RX ORDER — OMEPRAZOLE 40 MG/1
40 CAPSULE, DELAYED RELEASE ORAL DAILY
Qty: 90 CAPSULE | Refills: 0 | Status: SHIPPED | OUTPATIENT
Start: 2019-10-29 | End: 2019-11-19 | Stop reason: SDUPTHER

## 2019-10-29 RX ORDER — KETOROLAC TROMETHAMINE 30 MG/ML
60 INJECTION, SOLUTION INTRAMUSCULAR; INTRAVENOUS ONCE
Status: COMPLETED | OUTPATIENT
Start: 2019-10-29 | End: 2019-10-29

## 2019-10-29 RX ORDER — DILTIAZEM HYDROCHLORIDE 180 MG/1
180 CAPSULE, COATED, EXTENDED RELEASE ORAL DAILY
Qty: 90 CAPSULE | Refills: 0 | Status: SHIPPED | OUTPATIENT
Start: 2019-10-29 | End: 2019-11-19 | Stop reason: SDUPTHER

## 2019-10-29 RX ADMIN — KETOROLAC TROMETHAMINE 60 MG: 30 INJECTION, SOLUTION INTRAMUSCULAR; INTRAVENOUS at 14:02

## 2019-11-07 ENCOUNTER — HOSPITAL ENCOUNTER (OUTPATIENT)
Dept: PHYSICAL THERAPY | Age: 84
Setting detail: THERAPIES SERIES
Discharge: HOME | End: 2019-11-07
Payer: MEDICARE

## 2019-11-19 ENCOUNTER — TELEPHONE (OUTPATIENT)
Dept: PHARMACY | Facility: CLINIC | Age: 84
End: 2019-11-19

## 2019-11-21 ENCOUNTER — CARE COORDINATION (OUTPATIENT)
Dept: CARE COORDINATION | Age: 84
End: 2019-11-21

## 2019-12-23 DIAGNOSIS — K59.09 CHRONIC CONSTIPATION: ICD-10-CM

## 2019-12-23 RX ORDER — DOCUSATE SODIUM 100 MG
CAPSULE ORAL
Qty: 60 CAPSULE | Refills: 3 | Status: SHIPPED | OUTPATIENT
Start: 2019-12-23 | End: 2020-11-25 | Stop reason: ALTCHOICE

## 2019-12-27 RX ORDER — FLUTICASONE PROPIONATE 50 MCG
2 SPRAY, SUSPENSION (ML) NASAL DAILY PRN
Qty: 1 BOTTLE | Refills: 1 | Status: SHIPPED | OUTPATIENT
Start: 2019-12-27 | End: 2020-01-21 | Stop reason: SDUPTHER

## 2020-01-07 ENCOUNTER — CARE COORDINATION (OUTPATIENT)
Dept: CARE COORDINATION | Age: 85
End: 2020-01-07

## 2020-01-21 RX ORDER — LEVOTHYROXINE SODIUM 0.03 MG/1
TABLET ORAL
Qty: 30 TABLET | Refills: 3 | Status: SHIPPED | OUTPATIENT
Start: 2020-01-21 | End: 2020-02-06 | Stop reason: SDUPTHER

## 2020-01-21 RX ORDER — FLUTICASONE PROPIONATE 50 MCG
2 SPRAY, SUSPENSION (ML) NASAL DAILY PRN
Qty: 1 BOTTLE | Refills: 1 | Status: SHIPPED | OUTPATIENT
Start: 2020-01-21 | End: 2020-09-03

## 2020-01-27 ENCOUNTER — CARE COORDINATION (OUTPATIENT)
Dept: CARE COORDINATION | Age: 85
End: 2020-01-27

## 2020-02-06 ENCOUNTER — OFFICE VISIT (OUTPATIENT)
Dept: INTERNAL MEDICINE CLINIC | Age: 85
End: 2020-02-06
Payer: MEDICARE

## 2020-02-06 ENCOUNTER — CARE COORDINATION (OUTPATIENT)
Dept: CARE COORDINATION | Age: 85
End: 2020-02-06

## 2020-02-06 VITALS
HEIGHT: 63 IN | BODY MASS INDEX: 34.55 KG/M2 | OXYGEN SATURATION: 96 % | DIASTOLIC BLOOD PRESSURE: 78 MMHG | HEART RATE: 65 BPM | WEIGHT: 195 LBS | TEMPERATURE: 97.7 F | SYSTOLIC BLOOD PRESSURE: 130 MMHG

## 2020-02-06 DIAGNOSIS — I10 ESSENTIAL HYPERTENSION: ICD-10-CM

## 2020-02-06 DIAGNOSIS — E78.49 OTHER HYPERLIPIDEMIA: ICD-10-CM

## 2020-02-06 DIAGNOSIS — E11.9 TYPE 2 DIABETES MELLITUS WITHOUT COMPLICATION, WITHOUT LONG-TERM CURRENT USE OF INSULIN (HCC): ICD-10-CM

## 2020-02-06 DIAGNOSIS — E03.9 ACQUIRED HYPOTHYROIDISM: ICD-10-CM

## 2020-02-06 LAB
A/G RATIO: 1.4 (ref 1.1–2.2)
ALBUMIN SERPL-MCNC: 4.2 G/DL (ref 3.4–5)
ALP BLD-CCNC: 81 U/L (ref 40–129)
ALT SERPL-CCNC: 11 U/L (ref 10–40)
ANION GAP SERPL CALCULATED.3IONS-SCNC: 14 MMOL/L (ref 3–16)
AST SERPL-CCNC: 20 U/L (ref 15–37)
BILIRUB SERPL-MCNC: <0.2 MG/DL (ref 0–1)
BUN BLDV-MCNC: 26 MG/DL (ref 7–20)
CALCIUM SERPL-MCNC: 9.8 MG/DL (ref 8.3–10.6)
CHLORIDE BLD-SCNC: 102 MMOL/L (ref 99–110)
CHP ED QC CHECK: NORMAL
CO2: 26 MMOL/L (ref 21–32)
CREAT SERPL-MCNC: 1.1 MG/DL (ref 0.6–1.2)
CREATININE URINE: 502.8 MG/DL (ref 28–259)
GFR AFRICAN AMERICAN: 57
GFR NON-AFRICAN AMERICAN: 47
GLOBULIN: 3 G/DL
GLUCOSE BLD-MCNC: 126 MG/DL
GLUCOSE BLD-MCNC: 133 MG/DL (ref 70–99)
HBA1C MFR BLD: 6.7 %
MICROALBUMIN UR-MCNC: 2 MG/DL
MICROALBUMIN/CREAT UR-RTO: 4 MG/G (ref 0–30)
POTASSIUM SERPL-SCNC: 5.1 MMOL/L (ref 3.5–5.1)
SODIUM BLD-SCNC: 142 MMOL/L (ref 136–145)
TOTAL PROTEIN: 7.2 G/DL (ref 6.4–8.2)
TSH REFLEX: 2.96 UIU/ML (ref 0.27–4.2)

## 2020-02-06 PROCEDURE — 4040F PNEUMOC VAC/ADMIN/RCVD: CPT | Performed by: INTERNAL MEDICINE

## 2020-02-06 PROCEDURE — 1090F PRES/ABSN URINE INCON ASSESS: CPT | Performed by: INTERNAL MEDICINE

## 2020-02-06 PROCEDURE — 1036F TOBACCO NON-USER: CPT | Performed by: INTERNAL MEDICINE

## 2020-02-06 PROCEDURE — G8482 FLU IMMUNIZE ORDER/ADMIN: HCPCS | Performed by: INTERNAL MEDICINE

## 2020-02-06 PROCEDURE — 82962 GLUCOSE BLOOD TEST: CPT | Performed by: INTERNAL MEDICINE

## 2020-02-06 PROCEDURE — G8417 CALC BMI ABV UP PARAM F/U: HCPCS | Performed by: INTERNAL MEDICINE

## 2020-02-06 PROCEDURE — 99214 OFFICE O/P EST MOD 30 MIN: CPT | Performed by: INTERNAL MEDICINE

## 2020-02-06 PROCEDURE — G8427 DOCREV CUR MEDS BY ELIG CLIN: HCPCS | Performed by: INTERNAL MEDICINE

## 2020-02-06 PROCEDURE — 83036 HEMOGLOBIN GLYCOSYLATED A1C: CPT | Performed by: INTERNAL MEDICINE

## 2020-02-06 PROCEDURE — G8510 SCR DEP NEG, NO PLAN REQD: HCPCS | Performed by: INTERNAL MEDICINE

## 2020-02-06 PROCEDURE — 1123F ACP DISCUSS/DSCN MKR DOCD: CPT | Performed by: INTERNAL MEDICINE

## 2020-02-06 RX ORDER — OMEPRAZOLE 40 MG/1
40 CAPSULE, DELAYED RELEASE ORAL DAILY
Qty: 90 CAPSULE | Refills: 0 | Status: SHIPPED | OUTPATIENT
Start: 2020-02-06 | End: 2020-04-15

## 2020-02-06 RX ORDER — SIMVASTATIN 20 MG
TABLET ORAL
Qty: 90 TABLET | Refills: 1 | Status: SHIPPED | OUTPATIENT
Start: 2020-02-06 | End: 2020-02-06 | Stop reason: SDUPTHER

## 2020-02-06 RX ORDER — MELOXICAM 7.5 MG/1
7.5 TABLET ORAL DAILY PRN
COMMUNITY
End: 2021-07-19

## 2020-02-06 RX ORDER — OMEPRAZOLE 40 MG/1
40 CAPSULE, DELAYED RELEASE ORAL DAILY
Qty: 90 CAPSULE | Refills: 0 | Status: SHIPPED | OUTPATIENT
Start: 2020-02-06 | End: 2020-02-06 | Stop reason: SDUPTHER

## 2020-02-06 RX ORDER — DILTIAZEM HYDROCHLORIDE 180 MG/1
180 CAPSULE, COATED, EXTENDED RELEASE ORAL DAILY
Qty: 90 CAPSULE | Refills: 0 | Status: SHIPPED | OUTPATIENT
Start: 2020-02-06 | End: 2020-02-06 | Stop reason: SDUPTHER

## 2020-02-06 RX ORDER — SIMVASTATIN 20 MG
TABLET ORAL
Qty: 90 TABLET | Refills: 1 | Status: SHIPPED | OUTPATIENT
Start: 2020-02-06 | End: 2020-05-14 | Stop reason: SDUPTHER

## 2020-02-06 RX ORDER — LANCETS
1 EACH MISCELLANEOUS 2 TIMES DAILY
Qty: 100 EACH | Refills: 3 | Status: SHIPPED | OUTPATIENT
Start: 2020-02-06 | End: 2021-05-28

## 2020-02-06 RX ORDER — LEVOTHYROXINE SODIUM 0.03 MG/1
TABLET ORAL
Qty: 90 TABLET | Refills: 0 | Status: SHIPPED | OUTPATIENT
Start: 2020-02-06 | End: 2020-04-13

## 2020-02-06 RX ORDER — DILTIAZEM HYDROCHLORIDE 180 MG/1
180 CAPSULE, COATED, EXTENDED RELEASE ORAL DAILY
Qty: 90 CAPSULE | Refills: 0 | Status: SHIPPED | OUTPATIENT
Start: 2020-02-06 | End: 2020-04-15

## 2020-02-06 RX ORDER — BLOOD-GLUCOSE METER
1 EACH MISCELLANEOUS 2 TIMES DAILY
Qty: 1 KIT | Refills: 0 | Status: SHIPPED | OUTPATIENT
Start: 2020-02-06

## 2020-02-06 ASSESSMENT — PATIENT HEALTH QUESTIONNAIRE - PHQ9
1. LITTLE INTEREST OR PLEASURE IN DOING THINGS: 0
SUM OF ALL RESPONSES TO PHQ9 QUESTIONS 1 & 2: 0
SUM OF ALL RESPONSES TO PHQ QUESTIONS 1-9: 0
SUM OF ALL RESPONSES TO PHQ QUESTIONS 1-9: 0
2. FEELING DOWN, DEPRESSED OR HOPELESS: 0

## 2020-02-06 NOTE — PATIENT INSTRUCTIONS
TAKE MED AS ADVISED    DIET/ EXERCISE.     FOLLOW UP WITHIN 3 MONTHS / AS NEEDED    FOLLOW UP FOR LABS, EYE EXAM, ORTHO

## 2020-02-06 NOTE — CARE COORDINATION
Take 1 tablet by mouth 3 times daily as needed for Dizziness 9/10/19  Yes Diego Faulkner MD   cetirizine (ZYRTEC) 10 MG tablet Take 10 mg by mouth daily   Yes Historical Provider, MD   Cyanocobalamin (B-12) 2500 MCG SUBL Place 2,500 mg under the tongue daily   Yes Historical Provider, MD   aspirin 81 MG tablet Take 81 mg by mouth daily   Yes Historical Provider, MD   albuterol sulfate HFA (PROVENTIL HFA) 108 (90 Base) MCG/ACT inhaler Inhale 2 puffs into the lungs every 4 hours as needed for Wheezing or Shortness of Breath 2/8/18  Yes Dez Henry MD   montelukast (SINGULAIR) 10 MG tablet TAKE 1 TABLET BY MOUTH DAILY FOR ALLERGIES 10/31/17  Yes Karely De La Rosa MD   Lift Chair [address] [facility] by Does not apply route 3/7/16  Yes Karely De La Rosa MD   Iron-Vitamins (GERITOL PO) Take 1 tablet by mouth daily. Yes Historical Provider, MD   multivitamin (ANTIOXIDANT;PROSIGHT) TABS per tablet Take 1 tablet by mouth daily. Yes Historical Provider, MD   meloxicam (MOBIC) 7.5 MG tablet Take 7.5 mg by mouth daily as needed for Pain    Historical Provider, MD   diclofenac sodium 1 % GEL Apply 4 g topically 4 times daily as needed for Pain 2/6/20   Diego Faulkner MD   Blood Glucose Monitoring Suppl (ONE TOUCH ULTRA 2) w/Device KIT 1 kit by Does not apply route 2 times daily 2/6/20   iDego Faulkner MD   blood glucose test strips (ONE TOUCH ULTRA TEST) strip 1 each by In Vitro route 2 times daily As needed.  2/6/20   Diego Faulkner MD   ONE TOUCH ULTRASOFT LANCETS MISC 1 each by Does not apply route 2 times daily 2/6/20   Diego Faulkner MD   omeprazole (PRILOSEC) 40 MG delayed release capsule Take 1 capsule by mouth daily 2/6/20   Diego Faulkner MD   diltiazem [facility]) 180 MG extended release capsule Take 1 capsule by mouth daily 2/6/20   Diego Faulkner MD   simvastatin (ZOCOR) 20 MG tablet TAKE 1 TABLET BY MOUTH EVERY NIGHT AT BEDTIME 2/6/20   Diego Faulkner MD   metFORMIN (GLUCOPHAGE) 500 MG tablet TAKE 1 TABLET BY MOUTH TWICE DAILY WITH MEALS 2/6/20   Reg Pablo MD   levothyroxine (SYNTHROID) 25 MCG tablet TAKE 1 TABLET BY MOUTH EVERY MORNING BEFORE BREAKFAST 2/6/20   Reg Pablo MD       Future Appointments   Date Time Provider Haritha Nguyen   2/17/2020  9:20 AM MD Balaji Orona P[facility]

## 2020-02-06 NOTE — PROGRESS NOTES
RESPY DISTRESS, + OCC PAIN DISTRESS    REPEAT BP: 130/78 (T), NO ORTHOSTASIS     Body mass index is 34.54 kg/m². HEENT: NO PALLOR, ANICTERIC, PERRLA, EOMI, NO CONJUNCTIVAL ERYTHEMA,                 NO SINUS TENDERNESS  NECK:  SUPPLE, TRACHEA MIDLINE, NT, NO JVD, NO CB, NO LA, NO TM, NO STIFFNESS  CHEST: RESPY EFFORT NL, GOOD AE, NO W/R/C  HEART: S1S2+ REG, NO M/G/R  ABD: SOFT, NT, NO HSM, BS+  EXT: NO EDEMA, NT, PULSES +. DUNIA'S -VE  NEURO: ALERT AND ORIENTED X 3, NO MENINGEAL SIGNS, NO TREMORS, AMBULATING WITH A WALKER + LIMP OTHERWISE, NO FOCAL DEFICITS  PSYCH: FAIRLY GOOD AFFECT  BACK: NT, NO ROM, NO CVA TENDERNESS  HIP: NT, + PAIN WITH MVT RT, + ROM - RT      PREVIOUS LABS REVIEWED AND D/W PT     ACCUCHECK: 126    POC HBA1C: 6.7      ASSESSMENT / PLAN:     Diagnosis Orders   1. Type 2 diabetes mellitus without complication, without long-term current use of insulin ([facility] [address]  COUNSELLED. CONTINUE METFORMIN  MED COMPLIANCE ADVISED  DIET/ EXERCISE ADVISED. MONITOR. GOAL FBS 80- 120, HBA1C < 7  ADVISED ON HOME BLOOD SUGAR MONITORING - NEW GLUCOMETER OREDERED  F/U DM EYE EXAM - REFERRED  F/U LABS  MAKE CHANGES AS NEEDED. 2. Essential hypertension  COUNSELLED. CONTINUE MED. LOW NA+ / DASH DIET/ EXERCISE. MONITOR. GOAL </= 120/80  F/U LAB  MAKE CHANGES AS NEEDED. 3. Other hyperlipidemia  COUNSELLED. STABLE ON ZOCOR  ADVISED LOW FAT / CHOL DIET/ EXERCISE.  MONITOR LFT. GOALS D/W PT.  MAKE CHANGES AS NEEDED. 4. Moderate persistent asthma without complication  COUNSELLED. STABLE. CONTINUE SYMBICORT. ALBUTEROL PRN. MONITOR. MONITOR FOR TRIGGERS- ENVIRONMENTAL MODIFICATION. MAKE CHANGES AS NEEDED. 5. Acquired hypothyroidism  COUNSELLED. MONITOR ON SYNTHROID. TSH TO EVAL  MAKE CHANGES AS NEEDED       6. Gastroesophageal reflux disease without esophagitis  COUNSELLED. CONTINUE MGT. ANTIREFLUX PRECAUTIONS ADVISED. MONITOR. MAKE CHANGES AS NEEDED. 7. Right hip pain  COUNSELLED. ? OA. EXERCISES. ANALGESICS PRN. MONITOR. diclofenac sodium 1 % GEL PRN  MONITOR ALSO WITH MOBIC PRN  ADVISED F/U ORTHO  MAKE CHANGES AS NEEDED. Med Bates received counseling on the following healthy behaviors: nutrition, exercise and medication adherence    Patient given educational materials on Diabetes, Hyperlipidemia, Nutrition, Exercise and Hypertension    I have instructed Hugo to complete a self tracking handout on Blood Sugars , Blood Pressures  and Weights and instructed them to bring it with them to her next appointment. Discussed use, benefit, and side effects of prescribed medications. Barriers to medication compliance addressed. All patient questions answered. Pt voiced understanding.                MEDICATION SIDE EFFECTS D/W PATIENT    PT STABLE AT TIME OF D/C.    RETURN TO CLINIC WITHIN 3 MONTHS / PRN    FOLLOW UP FOR LABS, EYE EXAM, ORTHO

## 2020-02-17 ENCOUNTER — OFFICE VISIT (OUTPATIENT)
Dept: PULMONOLOGY | Age: 85
End: 2020-02-17
Payer: MEDICARE

## 2020-02-17 VITALS
BODY MASS INDEX: 34.38 KG/M2 | HEART RATE: 71 BPM | HEIGHT: 63 IN | RESPIRATION RATE: 16 BRPM | SYSTOLIC BLOOD PRESSURE: 116 MMHG | WEIGHT: 194 LBS | OXYGEN SATURATION: 97 % | DIASTOLIC BLOOD PRESSURE: 74 MMHG

## 2020-02-17 PROCEDURE — G8417 CALC BMI ABV UP PARAM F/U: HCPCS | Performed by: INTERNAL MEDICINE

## 2020-02-17 PROCEDURE — G8482 FLU IMMUNIZE ORDER/ADMIN: HCPCS | Performed by: INTERNAL MEDICINE

## 2020-02-17 PROCEDURE — 4040F PNEUMOC VAC/ADMIN/RCVD: CPT | Performed by: INTERNAL MEDICINE

## 2020-02-17 PROCEDURE — 1090F PRES/ABSN URINE INCON ASSESS: CPT | Performed by: INTERNAL MEDICINE

## 2020-02-17 PROCEDURE — 1036F TOBACCO NON-USER: CPT | Performed by: INTERNAL MEDICINE

## 2020-02-17 PROCEDURE — G8427 DOCREV CUR MEDS BY ELIG CLIN: HCPCS | Performed by: INTERNAL MEDICINE

## 2020-02-17 PROCEDURE — 1123F ACP DISCUSS/DSCN MKR DOCD: CPT | Performed by: INTERNAL MEDICINE

## 2020-02-17 PROCEDURE — 99214 OFFICE O/P EST MOD 30 MIN: CPT | Performed by: INTERNAL MEDICINE

## 2020-02-17 RX ORDER — FLUTICASONE FUROATE AND VILANTEROL 200; 25 UG/1; UG/1
1 POWDER RESPIRATORY (INHALATION) DAILY
Qty: 1 EACH | Refills: 11 | Status: SHIPPED | OUTPATIENT
Start: 2020-02-17 | End: 2021-03-08

## 2020-02-17 NOTE — PROGRESS NOTES
[facility]    Outpatient Follow Up Note    Subjective:   CHIEF COMPLAINT / HPI:     The patient is 80 y.o. female who presents today for 6 month follow up of upper airway cough syndrome and asthma. She remains on Zyrtec and Flonase. She stopped Breo 100 mcg MDI In august 2019 ago due to mouth soreness, which has improved off Breo. Her asthma worsened in October and she called so I prescribed Symbicort. Her asthma symptoms have improved although she thinks Breo did a better job. She now states that she was not rinsing her mouth out after using the [facility] when she developed her mouth soreness. No cough, wheezing, chest tightness or dyspnea on exertion. Past Medical History:    Past Medical History:   Diagnosis Date    Arthritis     Asthma     Cervical radiculopathy at C5 bilaterally and left C7 2/3/2016    Diabetes mellitus ([facility] [address]     Hyperlipidemia     Hypertension        Social History:    Social History     Tobacco Use   Smoking Status Never Smoker   Smokeless Tobacco Never Used       Current Medications:  Current Outpatient Medications on File Prior to Visit   Medication Sig Dispense Refill    meloxicam (MOBIC) 7.5 MG tablet Take 7.5 mg by mouth daily as needed for Pain      Blood Glucose Monitoring Suppl (ONE TOUCH ULTRA 2) w/Device KIT 1 kit by Does not apply route 2 times daily 1 kit 0    blood glucose test strips (ONE TOUCH ULTRA TEST) strip 1 each by In Vitro route 2 times daily As needed.  100 each 3    ONE TOUCH ULTRASOFT LANCETS MISC 1 each by Does not apply route 2 times daily 100 each 3    omeprazole (PRILOSEC) 40 MG delayed release capsule Take 1 capsule by mouth daily 90 capsule 0    diltiazem (CARDIZEM CD) 180 MG extended release capsule Take 1 capsule by mouth daily 90 capsule 0    simvastatin (ZOCOR) 20 MG tablet TAKE 1 TABLET BY MOUTH EVERY NIGHT AT BEDTIME 90 tablet 1    metFORMIN (GLUCOPHAGE) 500 MG tablet TAKE 1 TABLET BY MOUTH TWICE DAILY Negative for weakness, decreased ROM  NEUROLOGICAL: Negative for unilateral weakness, speech or gait abnormalities    Objective:   PHYSICAL EXAM:        VITALS:  /74 (Site: Right Upper Arm, Position: Sitting, Cuff Size: Large Adult)   Pulse 71   Resp 16   Ht 5' 3\" (1.6 m)   Wt 194 lb (88 kg)   SpO2 97%   Breastfeeding No   BMI 34.37 kg/m²     CONSTITUTIONAL:  Awake, alert, cooperative, no apparent distress, and appears stated age  HEENT: No oropharyngeal exudate, PERRL, no cervical adenopathy, no tracheal deviation, thyroid size normal  LUNGS:  No increased work of breathing and clear to auscultation, no crackles or wheezing  CARDIOVASCULAR:  normal S1 and S2 and no JVD  ABDOMEN:  Normal bowel sounds, non-distended and non-tender to palpation  EXT: No edema, no calf tenderness. Pulses are present bilaterally. NEUROLOGIC:  Mental Status Exam:  Level of Alertness:   awake  Orientation:   person, place, time. SKIN:  normal skin color, texture, turgor, no redness, warmth, or swelling     DATA:      Radiology Review:  Pertinent images / reports were reviewed as a part of this visit. CT Chest 1/30 reveals the following:  CT chest without IV including inspiration and expiration images       HISTORY: Chronic cough        Routine examination was carried out from thoracic inlet to upper   abdomen without IV contrast. Repeat imaging was then performed   using high-resolution bone algorithm during inspiration and   expiration       Trachea and mainstem bronchi are patent. Subpleural calcified   granuloma located in the right middle lobe. Another calcified   granulomas located within the lingula. . No consolidation or   pleural effusion. No air trapping.       Normal sized lymph nodes are located in the AP window and   pretracheal space. No hilar or anterior mediastinal   lymphadenopathy given limitation of no IV contrast. No axillary   lymphadenopathy.       Cardiac size normal. Adrenals not enlarged.  Visualized

## 2020-02-26 ENCOUNTER — CARE COORDINATION (OUTPATIENT)
Dept: CARE COORDINATION | Age: 85
End: 2020-02-26

## 2020-02-26 NOTE — CARE COORDINATION
Ambulatory Care Coordination Note  2/26/2020  CM Risk Score: 2  Charlson 10 Year Mortality Risk Score: 100%     ACC: Orestes Sanchez RN    Summary Note:   Called patient to follow up with care. Patient says she is still having pain to her right hip and behind both knees. Patient says her back is also weak. Patient says she goes to ortho and receives injection and the injection help with the pain somewhat. She was also given a cream to apply. Patient says ortho is going to refer her to a pain clinic. Plan to follow up with post injection next month    General Assessment    Do you have any symptoms that are causing concern?:  Yes  Reported Symptoms:  Pain (Comment: right side hip and behind both knees see a ortho )       Care Coordination Interventions    Program Enrollment:  Rising Risk  Referral from Primary Care Provider:  No  Suggested Interventions and Community Resources  Other Services or Interventions:  Pain Management       Goals Addressed                 This Visit's Progress       Patient Stated     Patient Stated (pt-stated)        Planning to go on a family cruise in May    Barriers: impairment:  physical: pain to right hip and behind both knees  Plan for overcoming my barriers: exercise, ortho, and pain clinic  Confidence: 7/10  Anticipated Goal Completion Date: 4/2020        . ... Prior to Admission medications    Medication Sig Start Date End Date Taking?  Authorizing Provider   Fluticasone furoate-vilanterol (BREO ELLIPTA) 200-25 MCG/INH AEPB inhaler Inhale 1 puff into the lungs daily 2/17/20   Tiffanie Perez MD   meloxicam (MOBIC) 7.5 MG tablet Take 7.5 mg by mouth daily as needed for Pain    Historical Provider, MD   diclofenac sodium 1 % GEL Apply 4 g topically 4 times daily as needed for Pain  Patient not taking: Reported on 2/17/2020 2/6/20   Sasha Sosa MD   Blood Glucose Monitoring Suppl (ONE TOUCH ULTRA 2) w/Device KIT 1 kit by Does not apply route 2 times daily 2/6/20   Kia Degroot not apply route 3/7/16   Marlin Santiago MD   Iron-Vitamins (GERITOL PO) Take 1 tablet by mouth daily. Historical Provider, MD   multivitamin (ANTIOXIDANT;PROSIGHT) TABS per tablet Take 1 tablet by mouth daily.       Historical Provider, MD       Future Appointments   Date Time Provider Haritha Wendy   5/14/2020  9:40 AM MD ALBERTO Loera [facility]   8/17/2020  9:00 AM David Nath MD [address] [facility]

## 2020-04-13 RX ORDER — LEVOTHYROXINE SODIUM 0.03 MG/1
TABLET ORAL
Qty: 90 TABLET | Refills: 0 | Status: SHIPPED | OUTPATIENT
Start: 2020-04-13 | End: 2020-05-14

## 2020-04-15 RX ORDER — DILTIAZEM HYDROCHLORIDE 180 MG/1
180 CAPSULE, COATED, EXTENDED RELEASE ORAL DAILY
Qty: 90 CAPSULE | Refills: 0 | Status: SHIPPED | OUTPATIENT
Start: 2020-04-15 | End: 2020-05-19 | Stop reason: SDUPTHER

## 2020-04-15 RX ORDER — OMEPRAZOLE 40 MG/1
40 CAPSULE, DELAYED RELEASE ORAL DAILY
Qty: 90 CAPSULE | Refills: 0 | Status: SHIPPED | OUTPATIENT
Start: 2020-04-15 | End: 2020-05-19 | Stop reason: SDUPTHER

## 2020-05-14 ENCOUNTER — VIRTUAL VISIT (OUTPATIENT)
Dept: INTERNAL MEDICINE CLINIC | Age: 85
End: 2020-05-14
Payer: MEDICARE

## 2020-05-14 PROCEDURE — 99214 OFFICE O/P EST MOD 30 MIN: CPT | Performed by: INTERNAL MEDICINE

## 2020-05-14 RX ORDER — HYDROXYZINE HYDROCHLORIDE 25 MG/1
25 TABLET, FILM COATED ORAL EVERY 8 HOURS PRN
Qty: 30 TABLET | Refills: 0 | Status: SHIPPED | OUTPATIENT
Start: 2020-05-14 | End: 2021-03-03 | Stop reason: SDUPTHER

## 2020-05-14 RX ORDER — SIMVASTATIN 20 MG
TABLET ORAL
Qty: 90 TABLET | Refills: 1 | Status: SHIPPED | OUTPATIENT
Start: 2020-05-14 | End: 2020-09-17 | Stop reason: SDUPTHER

## 2020-05-14 RX ORDER — LIOTHYRONINE SODIUM 25 UG/1
25 TABLET ORAL DAILY
Qty: 30 TABLET | Refills: 3 | Status: SHIPPED | OUTPATIENT
Start: 2020-05-14 | End: 2020-09-08

## 2020-05-14 NOTE — PROGRESS NOTES
DAILY  Radha Su MD   levothyroxine (SYNTHROID) 25 MCG tablet TAKE 1 TABLET BY MOUTH  EVERY MORNING BEFORE  BREAKFAST  Radha Su MD   Fluticasone furoate-vilanterol (BREO ELLIPTA) 200-25 MCG/INH AEPB inhaler Inhale 1 puff into the lungs daily  Paco Feng MD   meloxicam (MOBIC) 7.5 MG tablet Take 7.5 mg by mouth daily as needed for Pain  Historical Provider, MD   diclofenac sodium 1 % GEL Apply 4 g topically 4 times daily as needed for Pain  Radha Su MD   Blood Glucose Monitoring Suppl (ONE TOUCH ULTRA 2) w/Device KIT 1 kit by Does not apply route 2 times daily  Radha Su MD   blood glucose test strips (ONE TOUCH ULTRA TEST) strip 1 each by In Vitro route 2 times daily As needed.   Radha Su MD   ONE TOUCH ULTRASOFT LANCETS MISC 1 each by Does not apply route 2 times daily  Radha Su MD   simvastatin (ZOCOR) 20 MG tablet TAKE 1 TABLET BY MOUTH EVERY NIGHT AT BEDTIME  Radha Su MD   metFORMIN (GLUCOPHAGE) 500 MG tablet TAKE 1 TABLET BY MOUTH TWICE DAILY WITH MEALS  Radha Su MD   fluticasone (FLONASE) 50 MCG/ACT nasal spray 2 sprays by Nasal route daily as needed for Rhinitis or Allergies  Radha Su MD   STOOL SOFTENER 100 MG capsule TAKE 1 CAPSULE BY MOUTH TWICE DAILY AS NEEDED FOR CONSTIPATION  Radha Su MD   budesonide-formoterol (SYMBICORT) 160-4.5 MCG/ACT AERO Inhale 2 puffs into the lungs 2 times daily  Paco Feng MD   meclizine (ANTIVERT) 25 MG tablet Take 1 tablet by mouth 3 times daily as needed for Dizziness  Radha Su MD   cetirizine (ZYRTEC) 10 MG tablet Take 10 mg by mouth daily  Historical Provider, MD   Cyanocobalamin (B-12) 2500 MCG SUBL Place 2,500 mg under the tongue daily  Historical Provider, MD   aspirin 81 MG tablet Take 81 mg by mouth daily  Historical Provider, MD   albuterol sulfate HFA (PROVENTIL HFA) 108 (90 Base) MCG/ACT inhaler Inhale 2 puffs into the lungs every 4 hours as needed for Wheezing or Shortness

## 2020-05-19 ENCOUNTER — TELEPHONE (OUTPATIENT)
Dept: INTERNAL MEDICINE CLINIC | Age: 85
End: 2020-05-19

## 2020-05-19 RX ORDER — DILTIAZEM HYDROCHLORIDE 180 MG/1
180 CAPSULE, COATED, EXTENDED RELEASE ORAL DAILY
Qty: 90 CAPSULE | Refills: 0 | Status: SHIPPED | OUTPATIENT
Start: 2020-05-19 | End: 2020-09-17 | Stop reason: SDUPTHER

## 2020-05-19 RX ORDER — OMEPRAZOLE 40 MG/1
40 CAPSULE, DELAYED RELEASE ORAL DAILY
Qty: 90 CAPSULE | Refills: 0 | Status: SHIPPED | OUTPATIENT
Start: 2020-05-19 | End: 2020-08-18

## 2020-07-15 ENCOUNTER — HOSPITAL ENCOUNTER (OUTPATIENT)
Dept: MAMMOGRAPHY | Age: 85
Discharge: HOME | End: 2020-07-15
Payer: MEDICARE

## 2020-07-15 PROCEDURE — 77067 SCR MAMMO BI INCL CAD: CPT

## 2020-08-18 RX ORDER — OMEPRAZOLE 40 MG/1
40 CAPSULE, DELAYED RELEASE ORAL DAILY
Qty: 90 CAPSULE | Refills: 0 | Status: SHIPPED | OUTPATIENT
Start: 2020-08-18 | End: 2020-11-25 | Stop reason: SDUPTHER

## 2020-09-16 DIAGNOSIS — E03.9 ACQUIRED HYPOTHYROIDISM: ICD-10-CM

## 2020-09-16 DIAGNOSIS — I10 ESSENTIAL HYPERTENSION: ICD-10-CM

## 2020-09-16 DIAGNOSIS — E11.9 TYPE 2 DIABETES MELLITUS WITHOUT COMPLICATION, WITHOUT LONG-TERM CURRENT USE OF INSULIN (HCC): ICD-10-CM

## 2020-09-16 DIAGNOSIS — E78.49 OTHER HYPERLIPIDEMIA: ICD-10-CM

## 2020-09-16 LAB
A/G RATIO: 1.6 (ref 1.1–2.2)
ALBUMIN SERPL-MCNC: 4.1 G/DL (ref 3.4–5)
ALP BLD-CCNC: 70 U/L (ref 40–129)
ALT SERPL-CCNC: 9 U/L (ref 10–40)
ANION GAP SERPL CALCULATED.3IONS-SCNC: 14 MMOL/L (ref 3–16)
AST SERPL-CCNC: 19 U/L (ref 15–37)
BILIRUB SERPL-MCNC: <0.2 MG/DL (ref 0–1)
BUN BLDV-MCNC: 22 MG/DL (ref 7–20)
CALCIUM SERPL-MCNC: 9.5 MG/DL (ref 8.3–10.6)
CHLORIDE BLD-SCNC: 100 MMOL/L (ref 99–110)
CO2: 22 MMOL/L (ref 21–32)
CREAT SERPL-MCNC: 1.2 MG/DL (ref 0.6–1.2)
GFR AFRICAN AMERICAN: 51
GFR NON-AFRICAN AMERICAN: 42
GLOBULIN: 2.5 G/DL
GLUCOSE BLD-MCNC: 206 MG/DL (ref 70–99)
POTASSIUM SERPL-SCNC: 4.8 MMOL/L (ref 3.5–5.1)
SODIUM BLD-SCNC: 136 MMOL/L (ref 136–145)
TOTAL PROTEIN: 6.6 G/DL (ref 6.4–8.2)

## 2020-09-17 ENCOUNTER — OFFICE VISIT (OUTPATIENT)
Dept: INTERNAL MEDICINE CLINIC | Age: 85
End: 2020-09-17
Payer: MEDICARE

## 2020-09-17 VITALS
BODY MASS INDEX: 33.06 KG/M2 | OXYGEN SATURATION: 96 % | HEART RATE: 83 BPM | TEMPERATURE: 96.8 F | WEIGHT: 186.6 LBS | HEIGHT: 63 IN | DIASTOLIC BLOOD PRESSURE: 76 MMHG | SYSTOLIC BLOOD PRESSURE: 120 MMHG

## 2020-09-17 DIAGNOSIS — E78.49 OTHER HYPERLIPIDEMIA: ICD-10-CM

## 2020-09-17 DIAGNOSIS — I10 ESSENTIAL HYPERTENSION: ICD-10-CM

## 2020-09-17 DIAGNOSIS — E11.65 UNCONTROLLED TYPE 2 DIABETES MELLITUS WITH HYPERGLYCEMIA (HCC): ICD-10-CM

## 2020-09-17 LAB
CHOLESTEROL, TOTAL: 210 MG/DL (ref 0–199)
CHP ED QC CHECK: NORMAL
ESTIMATED AVERAGE GLUCOSE: 174.3 MG/DL
GLUCOSE BLD-MCNC: 150 MG/DL
HBA1C MFR BLD: 7.7 %
HDLC SERPL-MCNC: 52 MG/DL (ref 40–60)
LDL CHOLESTEROL CALCULATED: 116 MG/DL
TRIGL SERPL-MCNC: 211 MG/DL (ref 0–150)
TSH REFLEX: 0.99 UIU/ML (ref 0.27–4.2)
VLDLC SERPL CALC-MCNC: 42 MG/DL

## 2020-09-17 PROCEDURE — 99214 OFFICE O/P EST MOD 30 MIN: CPT | Performed by: INTERNAL MEDICINE

## 2020-09-17 PROCEDURE — 1036F TOBACCO NON-USER: CPT | Performed by: INTERNAL MEDICINE

## 2020-09-17 PROCEDURE — G8427 DOCREV CUR MEDS BY ELIG CLIN: HCPCS | Performed by: INTERNAL MEDICINE

## 2020-09-17 PROCEDURE — 3051F HG A1C>EQUAL 7.0%<8.0%: CPT | Performed by: INTERNAL MEDICINE

## 2020-09-17 PROCEDURE — G0008 ADMIN INFLUENZA VIRUS VAC: HCPCS | Performed by: INTERNAL MEDICINE

## 2020-09-17 PROCEDURE — 90653 IIV ADJUVANT VACCINE IM: CPT | Performed by: INTERNAL MEDICINE

## 2020-09-17 PROCEDURE — 4040F PNEUMOC VAC/ADMIN/RCVD: CPT | Performed by: INTERNAL MEDICINE

## 2020-09-17 PROCEDURE — 82962 GLUCOSE BLOOD TEST: CPT | Performed by: INTERNAL MEDICINE

## 2020-09-17 PROCEDURE — 1090F PRES/ABSN URINE INCON ASSESS: CPT | Performed by: INTERNAL MEDICINE

## 2020-09-17 PROCEDURE — 1123F ACP DISCUSS/DSCN MKR DOCD: CPT | Performed by: INTERNAL MEDICINE

## 2020-09-17 PROCEDURE — G8417 CALC BMI ABV UP PARAM F/U: HCPCS | Performed by: INTERNAL MEDICINE

## 2020-09-17 RX ORDER — DILTIAZEM HYDROCHLORIDE 180 MG/1
180 CAPSULE, COATED, EXTENDED RELEASE ORAL DAILY
Qty: 90 CAPSULE | Refills: 0 | Status: SHIPPED | OUTPATIENT
Start: 2020-09-17 | End: 2020-12-21 | Stop reason: SDUPTHER

## 2020-09-17 RX ORDER — METFORMIN HYDROCHLORIDE 750 MG/1
750 TABLET, EXTENDED RELEASE ORAL
Qty: 90 TABLET | Refills: 0 | Status: SHIPPED | OUTPATIENT
Start: 2020-09-17 | End: 2020-11-25 | Stop reason: DRUGHIGH

## 2020-09-17 RX ORDER — SIMVASTATIN 20 MG
TABLET ORAL
Qty: 90 TABLET | Refills: 0 | Status: SHIPPED | OUTPATIENT
Start: 2020-09-17 | End: 2020-09-17 | Stop reason: ALTCHOICE

## 2020-09-17 RX ORDER — LIOTHYRONINE SODIUM 25 UG/1
25 TABLET ORAL DAILY
Qty: 90 TABLET | Refills: 0 | Status: SHIPPED | OUTPATIENT
Start: 2020-09-17 | End: 2020-11-25 | Stop reason: SDUPTHER

## 2020-09-17 RX ORDER — ATORVASTATIN CALCIUM 20 MG/1
20 TABLET, FILM COATED ORAL NIGHTLY
Qty: 90 TABLET | Refills: 0 | Status: SHIPPED | OUTPATIENT
Start: 2020-09-17 | End: 2020-11-25 | Stop reason: SDUPTHER

## 2020-09-17 NOTE — PROGRESS NOTES
Vaccine Information Sheet, \"Influenza - Inactivated\"  given to Troy Yee, or parent/legal guardian of  Troy Yee and verbalized understanding. Patient responses:    Have you ever had a reaction to a flu vaccine? No  Do you have any current illness? No  Have you ever had Guillian Maple Syndrome? No  Do you have a serious allergy to any of the follow: Neomycin, Polymyxin, Thimerosal, eggs or egg products? No    Flu vaccine given per order. Please see immunization tab. Risks and benefits explained. Current VIS given.

## 2020-09-17 NOTE — PATIENT INSTRUCTIONS
TAKE MED AS ADVISED    DIET/ EXERCISE.     FOLLOW UP WITHIN 3 MONTHS / AS NEEDED    FOLLOW UP FOR FASTING LAB

## 2020-09-25 ENCOUNTER — OFFICE VISIT (OUTPATIENT)
Dept: PULMONOLOGY | Age: 85
End: 2020-09-25
Payer: MEDICARE

## 2020-09-25 VITALS
HEART RATE: 74 BPM | OXYGEN SATURATION: 97 % | BODY MASS INDEX: 32.78 KG/M2 | WEIGHT: 185 LBS | TEMPERATURE: 97.2 F | HEIGHT: 63 IN

## 2020-09-25 PROCEDURE — 4040F PNEUMOC VAC/ADMIN/RCVD: CPT | Performed by: INTERNAL MEDICINE

## 2020-09-25 PROCEDURE — 1123F ACP DISCUSS/DSCN MKR DOCD: CPT | Performed by: INTERNAL MEDICINE

## 2020-09-25 PROCEDURE — 1090F PRES/ABSN URINE INCON ASSESS: CPT | Performed by: INTERNAL MEDICINE

## 2020-09-25 PROCEDURE — 99214 OFFICE O/P EST MOD 30 MIN: CPT | Performed by: INTERNAL MEDICINE

## 2020-09-25 PROCEDURE — G8427 DOCREV CUR MEDS BY ELIG CLIN: HCPCS | Performed by: INTERNAL MEDICINE

## 2020-09-25 PROCEDURE — 1036F TOBACCO NON-USER: CPT | Performed by: INTERNAL MEDICINE

## 2020-09-25 PROCEDURE — G8417 CALC BMI ABV UP PARAM F/U: HCPCS | Performed by: INTERNAL MEDICINE

## 2020-09-25 RX ORDER — ALBUTEROL SULFATE 90 UG/1
2 AEROSOL, METERED RESPIRATORY (INHALATION) EVERY 4 HOURS PRN
Qty: 1 INHALER | Refills: 5 | Status: SHIPPED | OUTPATIENT
Start: 2020-09-25

## 2020-09-25 NOTE — PROGRESS NOTES
deviation, thyroid size normal  LUNGS:  No increased work of breathing and clear to auscultation, no crackles or wheezing  CARDIOVASCULAR:  normal S1 and S2 and no JVD  ABDOMEN:  Normal bowel sounds, non-distended and non-tender to palpation  EXT: No edema, no calf tenderness. Pulses are present bilaterally. NEUROLOGIC:  Mental Status Exam:  Level of Alertness:   awake  Orientation:   person, place, time. SKIN:  normal skin color, texture, turgor, no redness, warmth, or swelling     DATA:      Radiology Review:  Pertinent images / reports were reviewed as a part of this visit. CT Chest 1/30 reveals the following:  CT chest without IV including inspiration and expiration images       HISTORY: Chronic cough        Routine examination was carried out from thoracic inlet to upper   abdomen without IV contrast. Repeat imaging was then performed   using high-resolution bone algorithm during inspiration and   expiration       Trachea and mainstem bronchi are patent. Subpleural calcified   granuloma located in the right middle lobe. Another calcified   granulomas located within the lingula. . No consolidation or   pleural effusion. No air trapping.       Normal sized lymph nodes are located in the AP window and   pretracheal space. No hilar or anterior mediastinal   lymphadenopathy given limitation of no IV contrast. No axillary   lymphadenopathy.       Cardiac size normal. Adrenals not enlarged. Visualized sections of   liver and spleen normal. 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 Total lung capacity and residual volume were in the normal  range . Diffusing capacity was decreased prior to correction, but corrects  to normal with alveolar lung volumes. Methacholine challenge was performed  and the patient had a 32% drop in the FEV1 at the third dose of  methacholine, indicating a positive study.     CONCLUSION:  Mild obstructive baseline defect with positive methacholine  challenge. 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       Assessment:      Diagnosis Orders   1. Moderate persistent asthma without complication     2. Upper airway cough syndrome         Plan:       1. Continue Breo 200 mcg MDI and prn albuterol  2. Continue Flonase and Zyrtec  3.  RTC 6 months or sooner if needed, especially if symptoms of poorly controlled asthma emerge which we went over

## 2020-11-13 ENCOUNTER — TELEPHONE (OUTPATIENT)
Dept: INTERNAL MEDICINE CLINIC | Age: 85
End: 2020-11-13

## 2020-11-19 ENCOUNTER — CARE COORDINATION (OUTPATIENT)
Dept: CARE COORDINATION | Age: 85
End: 2020-11-19

## 2020-11-25 ENCOUNTER — OFFICE VISIT (OUTPATIENT)
Dept: INTERNAL MEDICINE CLINIC | Age: 85
End: 2020-11-25
Payer: MEDICARE

## 2020-11-25 ENCOUNTER — TELEPHONE (OUTPATIENT)
Dept: INTERNAL MEDICINE CLINIC | Age: 85
End: 2020-11-25

## 2020-11-25 VITALS
DIASTOLIC BLOOD PRESSURE: 78 MMHG | WEIGHT: 179 LBS | OXYGEN SATURATION: 97 % | TEMPERATURE: 97.3 F | HEIGHT: 63 IN | SYSTOLIC BLOOD PRESSURE: 128 MMHG | HEART RATE: 90 BPM | BODY MASS INDEX: 31.71 KG/M2

## 2020-11-25 LAB
CHP ED QC CHECK: NORMAL
GLUCOSE BLD-MCNC: 147 MG/DL

## 2020-11-25 PROCEDURE — 4040F PNEUMOC VAC/ADMIN/RCVD: CPT | Performed by: INTERNAL MEDICINE

## 2020-11-25 PROCEDURE — 99214 OFFICE O/P EST MOD 30 MIN: CPT | Performed by: INTERNAL MEDICINE

## 2020-11-25 PROCEDURE — 82962 GLUCOSE BLOOD TEST: CPT | Performed by: INTERNAL MEDICINE

## 2020-11-25 PROCEDURE — 1090F PRES/ABSN URINE INCON ASSESS: CPT | Performed by: INTERNAL MEDICINE

## 2020-11-25 PROCEDURE — 1036F TOBACCO NON-USER: CPT | Performed by: INTERNAL MEDICINE

## 2020-11-25 PROCEDURE — G8417 CALC BMI ABV UP PARAM F/U: HCPCS | Performed by: INTERNAL MEDICINE

## 2020-11-25 PROCEDURE — G8482 FLU IMMUNIZE ORDER/ADMIN: HCPCS | Performed by: INTERNAL MEDICINE

## 2020-11-25 PROCEDURE — 1123F ACP DISCUSS/DSCN MKR DOCD: CPT | Performed by: INTERNAL MEDICINE

## 2020-11-25 PROCEDURE — 3288F FALL RISK ASSESSMENT DOCD: CPT | Performed by: INTERNAL MEDICINE

## 2020-11-25 PROCEDURE — G8427 DOCREV CUR MEDS BY ELIG CLIN: HCPCS | Performed by: INTERNAL MEDICINE

## 2020-11-25 PROCEDURE — 3051F HG A1C>EQUAL 7.0%<8.0%: CPT | Performed by: INTERNAL MEDICINE

## 2020-11-25 RX ORDER — POLYETHYLENE GLYCOL 3350 17 G/17G
17 POWDER, FOR SOLUTION ORAL DAILY PRN
Qty: 510 G | Refills: 3 | Status: SHIPPED | OUTPATIENT
Start: 2020-11-25 | End: 2020-12-25

## 2020-11-25 RX ORDER — ATORVASTATIN CALCIUM 20 MG/1
20 TABLET, FILM COATED ORAL NIGHTLY
Qty: 90 TABLET | Refills: 0 | Status: SHIPPED | OUTPATIENT
Start: 2020-11-25 | End: 2021-01-13 | Stop reason: SDUPTHER

## 2020-11-25 RX ORDER — LIOTHYRONINE SODIUM 25 UG/1
25 TABLET ORAL DAILY
Qty: 90 TABLET | Refills: 0 | Status: SHIPPED | OUTPATIENT
Start: 2020-11-25 | End: 2021-01-13 | Stop reason: SDUPTHER

## 2020-11-25 RX ORDER — METFORMIN HYDROCHLORIDE 500 MG/1
1000 TABLET, EXTENDED RELEASE ORAL
Qty: 180 TABLET | Refills: 0 | Status: SHIPPED | OUTPATIENT
Start: 2020-11-25 | End: 2021-01-13 | Stop reason: SDUPTHER

## 2020-11-25 RX ORDER — OMEPRAZOLE 40 MG/1
40 CAPSULE, DELAYED RELEASE ORAL DAILY
Qty: 90 CAPSULE | Refills: 0 | Status: SHIPPED | OUTPATIENT
Start: 2020-11-25 | End: 2021-01-13 | Stop reason: SDUPTHER

## 2020-11-25 NOTE — TELEPHONE ENCOUNTER
Patient checking to see if one of her family memebers can bring her in for an appointment unable to do virtual visit doesnt have the right phone. tiny

## 2020-11-25 NOTE — TELEPHONE ENCOUNTER
I called pt she will be in the office today.  Pt states she got a steroid shot in her back from her pain  two weeks ago

## 2020-11-25 NOTE — TELEPHONE ENCOUNTER
Patient calling has some concerns about her blood sugar blood sugar has been running high for several days and had gotten up to 300  three days ago.  Patient says today it was 202 before taking medication patient is  taking metphormin patient says it is normally in the 100 please advise

## 2020-11-25 NOTE — PROGRESS NOTES
SUBJECTIVE:  Ellen Russo is a 80 y.o. female [address] [facility]   Chief Complaint   Patient presents with    Diabetes    Other      PT HERE FOR EVAL     DM-  TAKING MED. STATES BLOOD SUGARS ELEVATED RECENTLY  - 200 'S. + EXERCISE / DIET COMPLIANCE . STATES S/P EPIDURAL INJECTION 2-3 WEEKS AGO. LAST EYE EXAM  3/ 2020  HTN - TAKING MED.  + DIET / EXERCISE COMPLIANCE.  NO HEADACHE , OCC DIZZINESS - NONE NOW  HLP - TAKING LIPITOR - TOLERATING. + DIET / EXERCISE COMPLIANCE. ?  MUSCLE CRAMPS / NO MUSCLE ACHES  ASTHMA - NO WHEEZING, NO SOB, NO INCREASED INHALER USE. HYPOTHYROIDISM - TAKING CYTOMEL - TOLERATING. + FATIGUE. NO COLD INTOLERANCE  C/O CONSTIPATION - INCREASED RECENTLY. STATES MED NOT HELPING  RT HIP PAIN -  IMPROVED FFG EPIDURAL SHOT. PAIN FREE AT THIS TIME. FOLLOWING  WITH ORTHO. GERD - TAKING MED. DENIES HEARTBURN. DENIES CP, No SOB, No PALPITATIONS, [address] [place] [address], NO F/C  No ABD PAIN, No N/V, No DIARRHEA, + CONSTIPATION, No MELENA, No HEMATOCHEZIA. No DYSURIA, No FREQ, No URGENCY, No HEMATURIA       PMH: REVIEWED AND UPDATED TODAY    PSH: REVIEWED AND UPDATED TODAY    SOCIAL HX: REVIEWED AND UPDATED TODAY    FAMILY HX: REVIEWED AND UPDATED TODAY    ALLERGY:  Amlodipine; Breo ellipta [fluticasone furoate-vilanterol]; Lisinopril; Pcn [penicillins]; and Synthroid [levothyroxine]    MEDS: REVIEWED  Prior to Visit Medications    Medication Sig Taking?  Authorizing Provider   albuterol sulfate HFA (PROVENTIL HFA) 108 (90 Base) MCG/ACT inhaler Inhale 2 puffs into the lungs every 4 hours as needed for Wheezing or Shortness of Breath Yes Neha Coronel MD   metFORMIN (GLUCOPHAGE XR) 750 MG extended release tablet Take 1 tablet by mouth daily (with breakfast) Yes Nilson Koch MD   dilTIAZem (CARDIZEM CD) 180 MG extended release capsule Take 1 capsule by mouth daily Yes Nilson Koch MD   liothyronine (CYTOMEL) 25 MCG tablet Take 1 tablet by mouth daily Yes Nilson Koch MD   atorvastatin (LIPITOR) 20 BREO. ALBUTEROL PRN. MONITOR. MONITOR FOR TRIGGERS- ENVIRONMENTAL MODIFICATION. MAKE CHANGES AS NEEDED. 5. Acquired hypothyroidism  COUNSELLED. CYTOMEL 25 MCG REFILLED  TFT TO EVAL  MAKE CHANGES AS NEEDED. 6. Chronic constipation  COUNSELLED. SYMPTOMATIC RX. WARM PRUNE JUICE PRN. DIETARY MODIFICATION. CHANGE MED TO MIRALAX PRN  MAKE CHANGES AS NEEDED. 7. Right hip pain  COUNSELLED. IMPROVED. EXERCISES. ANALGESICS PRN. MONITOR. F/U ORTHO. MAKE CHANGES AS NEEDED. 8. Gastroesophageal reflux disease without esophagitis  COUNSELLED. CONTINUE MGT. ANTIREFLUX PRECAUTIONS ADVISED. MONITOR. MAKE CHANGES AS NEEDED.                           MEDICATION SIDE EFFECTS D/W PATIENT    PT STABLE AT TIME OF D/C.    RETURN TO CLINIC WITHIN 2 MONTHS / PRN    FOLLOW UP FOR FASTING LABS

## 2020-11-27 DIAGNOSIS — E78.49 OTHER HYPERLIPIDEMIA: ICD-10-CM

## 2020-11-27 DIAGNOSIS — I10 ESSENTIAL HYPERTENSION: ICD-10-CM

## 2020-11-27 DIAGNOSIS — E11.65 UNCONTROLLED TYPE 2 DIABETES MELLITUS WITH HYPERGLYCEMIA (HCC): ICD-10-CM

## 2020-11-27 DIAGNOSIS — E03.9 ACQUIRED HYPOTHYROIDISM: ICD-10-CM

## 2020-11-27 LAB
A/G RATIO: 1.6 (ref 1.1–2.2)
ALBUMIN SERPL-MCNC: 4.1 G/DL (ref 3.4–5)
ALP BLD-CCNC: 88 U/L (ref 40–129)
ALT SERPL-CCNC: 14 U/L (ref 10–40)
ANION GAP SERPL CALCULATED.3IONS-SCNC: 13 MMOL/L (ref 3–16)
AST SERPL-CCNC: 16 U/L (ref 15–37)
BASOPHILS ABSOLUTE: 0 K/UL (ref 0–0.2)
BASOPHILS RELATIVE PERCENT: 0.5 %
BILIRUB SERPL-MCNC: 0.3 MG/DL (ref 0–1)
BUN BLDV-MCNC: 21 MG/DL (ref 7–20)
CALCIUM SERPL-MCNC: 9.7 MG/DL (ref 8.3–10.6)
CHLORIDE BLD-SCNC: 102 MMOL/L (ref 99–110)
CHOLESTEROL, TOTAL: 157 MG/DL (ref 0–199)
CO2: 27 MMOL/L (ref 21–32)
CREAT SERPL-MCNC: 0.9 MG/DL (ref 0.6–1.2)
EOSINOPHILS ABSOLUTE: 0.1 K/UL (ref 0–0.6)
EOSINOPHILS RELATIVE PERCENT: 1.3 %
GFR AFRICAN AMERICAN: >60
GFR NON-AFRICAN AMERICAN: 59
GLOBULIN: 2.5 G/DL
GLUCOSE BLD-MCNC: 115 MG/DL (ref 70–99)
HCT VFR BLD CALC: 33.4 % (ref 36–48)
HDLC SERPL-MCNC: 56 MG/DL (ref 40–60)
HEMOGLOBIN: 11 G/DL (ref 12–16)
LDL CHOLESTEROL CALCULATED: 75 MG/DL
LYMPHOCYTES ABSOLUTE: 1.6 K/UL (ref 1–5.1)
LYMPHOCYTES RELATIVE PERCENT: 24.2 %
MCH RBC QN AUTO: 26.8 PG (ref 26–34)
MCHC RBC AUTO-ENTMCNC: 32.8 G/DL (ref 31–36)
MCV RBC AUTO: 81.6 FL (ref 80–100)
MONOCYTES ABSOLUTE: 0.4 K/UL (ref 0–1.3)
MONOCYTES RELATIVE PERCENT: 6.2 %
NEUTROPHILS ABSOLUTE: 4.6 K/UL (ref 1.7–7.7)
NEUTROPHILS RELATIVE PERCENT: 67.8 %
PDW BLD-RTO: 17.3 % (ref 12.4–15.4)
PLATELET # BLD: 169 K/UL (ref 135–450)
PMV BLD AUTO: 8.7 FL (ref 5–10.5)
POTASSIUM SERPL-SCNC: 4.6 MMOL/L (ref 3.5–5.1)
RBC # BLD: 4.1 M/UL (ref 4–5.2)
SODIUM BLD-SCNC: 142 MMOL/L (ref 136–145)
TOTAL PROTEIN: 6.6 G/DL (ref 6.4–8.2)
TRIGL SERPL-MCNC: 130 MG/DL (ref 0–150)
TSH REFLEX: 0.42 UIU/ML (ref 0.27–4.2)
VITAMIN D 25-HYDROXY: 50.6 NG/ML
VLDLC SERPL CALC-MCNC: 26 MG/DL
WBC # BLD: 6.8 K/UL (ref 4–11)

## 2020-12-21 ENCOUNTER — TELEPHONE (OUTPATIENT)
Dept: INTERNAL MEDICINE CLINIC | Age: 85
End: 2020-12-21

## 2020-12-21 DIAGNOSIS — I10 ESSENTIAL HYPERTENSION: ICD-10-CM

## 2020-12-21 RX ORDER — DILTIAZEM HYDROCHLORIDE 180 MG/1
180 CAPSULE, COATED, EXTENDED RELEASE ORAL DAILY
Qty: 90 CAPSULE | Refills: 0 | Status: SHIPPED | OUTPATIENT
Start: 2020-12-21 | End: 2021-03-03 | Stop reason: SDUPTHER

## 2021-01-13 ENCOUNTER — OFFICE VISIT (OUTPATIENT)
Dept: INTERNAL MEDICINE CLINIC | Age: 86
End: 2021-01-13
Payer: MEDICARE

## 2021-01-13 VITALS
DIASTOLIC BLOOD PRESSURE: 78 MMHG | BODY MASS INDEX: 30.23 KG/M2 | HEIGHT: 63 IN | OXYGEN SATURATION: 94 % | SYSTOLIC BLOOD PRESSURE: 130 MMHG | WEIGHT: 170.6 LBS | TEMPERATURE: 97.1 F | HEART RATE: 83 BPM

## 2021-01-13 DIAGNOSIS — E03.9 ACQUIRED HYPOTHYROIDISM: ICD-10-CM

## 2021-01-13 DIAGNOSIS — J45.40 MODERATE PERSISTENT ASTHMA WITHOUT COMPLICATION: ICD-10-CM

## 2021-01-13 DIAGNOSIS — E78.49 OTHER HYPERLIPIDEMIA: ICD-10-CM

## 2021-01-13 DIAGNOSIS — I10 ESSENTIAL HYPERTENSION: ICD-10-CM

## 2021-01-13 DIAGNOSIS — E11.9 TYPE 2 DIABETES MELLITUS WITHOUT COMPLICATION, WITHOUT LONG-TERM CURRENT USE OF INSULIN (HCC): Primary | ICD-10-CM

## 2021-01-13 DIAGNOSIS — K21.9 GASTROESOPHAGEAL REFLUX DISEASE WITHOUT ESOPHAGITIS: ICD-10-CM

## 2021-01-13 DIAGNOSIS — K59.09 CHRONIC CONSTIPATION: ICD-10-CM

## 2021-01-13 DIAGNOSIS — M25.551 RIGHT HIP PAIN: ICD-10-CM

## 2021-01-13 LAB
CHP ED QC CHECK: NORMAL
GLUCOSE BLD-MCNC: 138 MG/DL
HBA1C MFR BLD: 6.9 %

## 2021-01-13 PROCEDURE — 3288F FALL RISK ASSESSMENT DOCD: CPT | Performed by: INTERNAL MEDICINE

## 2021-01-13 PROCEDURE — G8417 CALC BMI ABV UP PARAM F/U: HCPCS | Performed by: INTERNAL MEDICINE

## 2021-01-13 PROCEDURE — 99214 OFFICE O/P EST MOD 30 MIN: CPT | Performed by: INTERNAL MEDICINE

## 2021-01-13 PROCEDURE — 83036 HEMOGLOBIN GLYCOSYLATED A1C: CPT | Performed by: INTERNAL MEDICINE

## 2021-01-13 PROCEDURE — 1090F PRES/ABSN URINE INCON ASSESS: CPT | Performed by: INTERNAL MEDICINE

## 2021-01-13 PROCEDURE — 82962 GLUCOSE BLOOD TEST: CPT | Performed by: INTERNAL MEDICINE

## 2021-01-13 PROCEDURE — 1123F ACP DISCUSS/DSCN MKR DOCD: CPT | Performed by: INTERNAL MEDICINE

## 2021-01-13 PROCEDURE — G8427 DOCREV CUR MEDS BY ELIG CLIN: HCPCS | Performed by: INTERNAL MEDICINE

## 2021-01-13 PROCEDURE — 1036F TOBACCO NON-USER: CPT | Performed by: INTERNAL MEDICINE

## 2021-01-13 PROCEDURE — G8510 SCR DEP NEG, NO PLAN REQD: HCPCS | Performed by: INTERNAL MEDICINE

## 2021-01-13 PROCEDURE — 4040F PNEUMOC VAC/ADMIN/RCVD: CPT | Performed by: INTERNAL MEDICINE

## 2021-01-13 PROCEDURE — G8482 FLU IMMUNIZE ORDER/ADMIN: HCPCS | Performed by: INTERNAL MEDICINE

## 2021-01-13 RX ORDER — LIOTHYRONINE SODIUM 25 UG/1
25 TABLET ORAL DAILY
Qty: 90 TABLET | Refills: 0 | Status: SHIPPED | OUTPATIENT
Start: 2021-01-13 | End: 2021-03-31 | Stop reason: SDUPTHER

## 2021-01-13 RX ORDER — OMEPRAZOLE 40 MG/1
40 CAPSULE, DELAYED RELEASE ORAL DAILY
Qty: 90 CAPSULE | Refills: 0 | Status: SHIPPED | OUTPATIENT
Start: 2021-01-13 | End: 2021-04-19 | Stop reason: SDUPTHER

## 2021-01-13 RX ORDER — ATORVASTATIN CALCIUM 20 MG/1
20 TABLET, FILM COATED ORAL NIGHTLY
Qty: 90 TABLET | Refills: 0 | Status: SHIPPED | OUTPATIENT
Start: 2021-01-13 | End: 2021-03-31 | Stop reason: SDUPTHER

## 2021-01-13 RX ORDER — METFORMIN HYDROCHLORIDE 500 MG/1
1000 TABLET, EXTENDED RELEASE ORAL
Qty: 180 TABLET | Refills: 0 | Status: SHIPPED | OUTPATIENT
Start: 2021-01-13 | End: 2021-04-19 | Stop reason: ALTCHOICE

## 2021-01-13 ASSESSMENT — PATIENT HEALTH QUESTIONNAIRE - PHQ9
SUM OF ALL RESPONSES TO PHQ QUESTIONS 1-9: 0
SUM OF ALL RESPONSES TO PHQ9 QUESTIONS 1 & 2: 0

## 2021-01-13 NOTE — PROGRESS NOTES
SUBJECTIVE:  Kalyan Dove is a 80 y.o. female [address] [place]   Chief Complaint   Patient presents with    Diabetes    Hypertension    Other        PT HERE FOR EVAL     DM-  TAKING MED - TOLERATING. BLOOD SUGARS - 130'S. + EXERCISE / DIET COMPLIANCE . LAST EYE EXAM  3/ 2020  HTN - TAKING MED.  + DIET / EXERCISE COMPLIANCE.  NO HEADACHE ,  DIZZINESS - RESOLVED  HLP - TAKING LIPITOR . + DIET / EXERCISE COMPLIANCE. ?  MUSCLE CRAMPS / NO MUSCLE ACHES  ASTHMA - NO WHEEZING, NO SOB, NO INCREASED INHALER USE. HYPOTHYROIDISM - TAKING CYTOMEL - TOLERATING. + FATIGUE. NO COLD INTOLERANCE  GERD - TAKING MED. DENIES HEARTBURN. CONSTIPATION - IMPROVED. MED HELPING  RT HIP PAIN -[address] ? PAIN SCALE. FOLLOWING  WITH ORTHO.       DENIES CP, No SOB, No PALPITATIONS, No COUGH, NO F/C  No ABD PAIN, No N/V, No DIARRHEA,  CONSTIPATION - IMPROVED, No MELENA, No HEMATOCHEZIA. No DYSURIA, No FREQ, No URGENCY, No HEMATURIA       PMH: REVIEWED AND UPDATED TODAY    PSH: REVIEWED AND UPDATED TODAY    SOCIAL HX: REVIEWED AND UPDATED TODAY    FAMILY HX: REVIEWED AND UPDATED TODAY    ALLERGY:  Amlodipine, Breo ellipta [fluticasone furoate-vilanterol], Lisinopril, Pcn [penicillins], and Synthroid [levothyroxine]    MEDS: REVIEWED  Prior to Visit Medications    Medication Sig Taking?  Authorizing Provider   dilTIAZem (CARDIZEM CD) 180 MG extended release capsule Take 1 capsule by mouth daily Yes Orestes Emanuel MD   metFORMIN (GLUCOPHAGE XR) 500 MG extended release tablet Take 2 tablets by mouth daily (with breakfast) Yes Orestes Emanuel MD   liothyronine (CYTOMEL) 25 MCG tablet Take 1 tablet by mouth daily Yes Orestes Emanuel MD   atorvastatin (LIPITOR) 20 MG tablet Take 1 tablet by mouth nightly Yes Orestes Emanuel MD   omeprazole (PRILOSEC) 40 MG delayed release capsule Take 1 capsule by mouth daily Yes Orestes Emanuel MD ROS: COMPREHENSIVE ROS AS IN HX, REST -VE  History obtained from chart review and the patient    OBJECTIVE:   NURSING NOTE AND VITALS REVIEWED  /80 (Site: Left Upper Arm, Position: Sitting, Cuff Size: Large Adult)   Pulse 83   Temp 97.1 °F (36.2 °C)   Ht 5' 3\" (1.6 m)   Wt 170 lb 9.6 oz (77.4 kg)   SpO2 94%   Breastfeeding No   BMI 30.22 kg/m²     NO ACUTE DISTRESS    REPEAT BP: 130/78 (LT), NO ORTHOSTASIS    Body mass index is 30.22 kg/m². HEENT: NO PALLOR, ANICTERIC, PERRLA, EOMI, NO CONJUNCTIVAL ERYTHEMA,                 NO SINUS TENDERNESS  NECK:  SUPPLE, TRACHEA MIDLINE, NT, NO JVD, NO CB, NO LA, NO TM, NO STIFFNESS  CHEST: RESPY EFFORT NL, GOOD AE, NO W/R/C  HEART: S1S2+ REG, NO M/G/R  ABD: OBESE, SOFT, NT, NO HSM, BS+  EXT: NO EDEMA, NT, PULSES +. DUNIA'S -VE  NEURO: ALERT AND ORIENTED X 3, NO MENINGEAL SIGNS, NO TREMORS, AMBULATING WITH A WALKER OTHERWISE, NO FOCAL DEFICITS  PSYCH: FAIRLY GOOD AFFECT  BACK: NT, NO ROM, NO CVA TENDERNESS  HIP: NT, OCC PAIN WITH MVT, NO ROM    PREVIOUS LABS  REVIEWED AND D/W PT     ACCUCHECK: 138    POC HBA1C: 6.9      ASSESSMENT / PLAN:     Diagnosis Orders   1. Type 2 diabetes mellitus without complication, without long-term current use of insulin ([facility] [address]  COUNSELLED. IMPROVING CONTROL. CONTINUE MED  MED COMPLIANCE ADVISED  DIET/ EXERCISE ADVISED. MONITOR. GOAL FBS 80- 120, HBA1C < 7  ADVISED ON HOME BLOOD SUGAR MONITORING  MAKE CHANGES AS NEEDED. 2. Essential hypertension  COUNSELLED. CONTINUE MED. ADVISED LOW NA+ / DASH DIET/ EXERCISE. MONITOR. GOAL </= 120/80  MAKE CHANGES AS NEEDED. 3. Other hyperlipidemia  COUNSELLED. ADVISED LOW FAT / CHOL DIET/ EXERCISE.  MONITOR ON LIPITOR. GOALS D/W PT.  MAKE CHANGES AS NEEDED. 4. Moderate persistent asthma without complication  COUNSELLED. CONTINUE BREO. ALBUTEROL PRN. MONITOR. MONITOR FOR TRIGGERS- ENVIRONMENTAL MODIFICATION. MAKE CHANGES AS NEEDED. 5. Acquired hypothyroidism  COUNSELLED. CONTINUE CYTOMEL 25 MCG . MONITOR. MAKE CHANGES AS NEEDED. 6. Gastroesophageal reflux disease without esophagitis  COUNSELLED. CONTINUE MGT. ANTIREFLUX PRECAUTIONS ADVISED. MONITOR. MAKE CHANGES AS NEEDED. 7. Chronic constipation  COUNSELLED. IMPROVING. SYMPTOMATIC RX. WARM PRUNE JUICE PRN. DIETARY MODIFICATION. MIRALAX PEN  MAKE CHANGES AS NEEDED. 8. Right hip pain  COUNSELLED. ? OA. EXERCISES. ANALGESICS PRN. MONITOR. F/U ORTHO  MAKE CHANGES AS NEEDED.                         MEDICATION SIDE EFFECTS D/W PATIENT      RETURN TO CLINIC WITHIN 3 MONTHS / PRN    F/U WELLNESS VISIT

## 2021-03-01 ENCOUNTER — TELEPHONE (OUTPATIENT)
Dept: INTERNAL MEDICINE CLINIC | Age: 86
End: 2021-03-01

## 2021-03-01 NOTE — TELEPHONE ENCOUNTER
Patient has a rash on both of breast, stomach, back and one on leg. Daughter believes it's the increase of medication that caused the rash. Patient indicated the rash is itchy. Pictures can be provided by daughter.

## 2021-03-03 ENCOUNTER — OFFICE VISIT (OUTPATIENT)
Dept: INTERNAL MEDICINE CLINIC | Age: 86
End: 2021-03-03
Payer: MEDICARE

## 2021-03-03 VITALS
HEART RATE: 86 BPM | WEIGHT: 170.8 LBS | TEMPERATURE: 97.3 F | SYSTOLIC BLOOD PRESSURE: 164 MMHG | DIASTOLIC BLOOD PRESSURE: 90 MMHG | BODY MASS INDEX: 30.26 KG/M2 | OXYGEN SATURATION: 97 %

## 2021-03-03 DIAGNOSIS — I10 HYPERTENSION, UNCONTROLLED: ICD-10-CM

## 2021-03-03 DIAGNOSIS — L28.2 PRURITIC RASH: Primary | ICD-10-CM

## 2021-03-03 DIAGNOSIS — J45.40 MODERATE PERSISTENT ASTHMA WITHOUT COMPLICATION: ICD-10-CM

## 2021-03-03 DIAGNOSIS — E03.9 ACQUIRED HYPOTHYROIDISM: ICD-10-CM

## 2021-03-03 DIAGNOSIS — K21.9 GASTROESOPHAGEAL REFLUX DISEASE WITHOUT ESOPHAGITIS: ICD-10-CM

## 2021-03-03 DIAGNOSIS — E78.49 OTHER HYPERLIPIDEMIA: ICD-10-CM

## 2021-03-03 DIAGNOSIS — E11.9 TYPE 2 DIABETES MELLITUS WITHOUT COMPLICATION, WITHOUT LONG-TERM CURRENT USE OF INSULIN (HCC): ICD-10-CM

## 2021-03-03 DIAGNOSIS — K59.09 CHRONIC CONSTIPATION: ICD-10-CM

## 2021-03-03 DIAGNOSIS — L28.2 PRURITIC RASH: ICD-10-CM

## 2021-03-03 LAB
CHP ED QC CHECK: NORMAL
GLUCOSE BLD-MCNC: 160 MG/DL

## 2021-03-03 PROCEDURE — G8482 FLU IMMUNIZE ORDER/ADMIN: HCPCS | Performed by: INTERNAL MEDICINE

## 2021-03-03 PROCEDURE — G8417 CALC BMI ABV UP PARAM F/U: HCPCS | Performed by: INTERNAL MEDICINE

## 2021-03-03 PROCEDURE — 4040F PNEUMOC VAC/ADMIN/RCVD: CPT | Performed by: INTERNAL MEDICINE

## 2021-03-03 PROCEDURE — 82962 GLUCOSE BLOOD TEST: CPT | Performed by: INTERNAL MEDICINE

## 2021-03-03 PROCEDURE — 99214 OFFICE O/P EST MOD 30 MIN: CPT | Performed by: INTERNAL MEDICINE

## 2021-03-03 PROCEDURE — 1090F PRES/ABSN URINE INCON ASSESS: CPT | Performed by: INTERNAL MEDICINE

## 2021-03-03 PROCEDURE — G8427 DOCREV CUR MEDS BY ELIG CLIN: HCPCS | Performed by: INTERNAL MEDICINE

## 2021-03-03 PROCEDURE — 1036F TOBACCO NON-USER: CPT | Performed by: INTERNAL MEDICINE

## 2021-03-03 PROCEDURE — 1123F ACP DISCUSS/DSCN MKR DOCD: CPT | Performed by: INTERNAL MEDICINE

## 2021-03-03 RX ORDER — HYDROXYZINE HYDROCHLORIDE 25 MG/1
25 TABLET, FILM COATED ORAL EVERY 8 HOURS PRN
Qty: 30 TABLET | Refills: 0 | Status: SHIPPED | OUTPATIENT
Start: 2021-03-03 | End: 2021-03-13

## 2021-03-03 RX ORDER — DILTIAZEM HYDROCHLORIDE 180 MG/1
180 CAPSULE, COATED, EXTENDED RELEASE ORAL DAILY
Qty: 90 CAPSULE | Refills: 0 | Status: SHIPPED | OUTPATIENT
Start: 2021-03-03 | End: 2021-05-28

## 2021-03-03 RX ORDER — LISINOPRIL 40 MG/1
40 TABLET ORAL DAILY
COMMUNITY
End: 2021-03-03

## 2021-03-03 RX ORDER — CLOTRIMAZOLE AND BETAMETHASONE DIPROPIONATE 10; .64 MG/G; MG/G
CREAM TOPICAL
Qty: 60 G | Refills: 0 | Status: SHIPPED | OUTPATIENT
Start: 2021-03-03 | End: 2021-04-19 | Stop reason: ALTCHOICE

## 2021-03-03 RX ORDER — BLOOD PRESSURE TEST KIT-MEDIUM
1 KIT MISCELLANEOUS DAILY
Qty: 1 EACH | Refills: 0 | Status: SHIPPED | OUTPATIENT
Start: 2021-03-03

## 2021-03-03 NOTE — PATIENT INSTRUCTIONS
TAKE MED AS ADVISED    DIET/ EXERCISE.     FOLLOW UP WITHIN 4 WEEKS / AS NEEDED    FOLLOW UP FOR LABS, EYE EXAM

## 2021-03-03 NOTE — PROGRESS NOTES
SUBJECTIVE:  Bessy Davis is a 80 y.o. female HERE FOR   Chief Complaint   Patient presents with    Other     RASH ON HER BACK AND UNDER HER BREAST AND LEGS         PT HERE FOR EVAL  PT HERE WITH GRANDDAUGHTER    C/O RASH  -  PAST FEW WEEKS. NOTED 2-3 WEEKS AFTER TAKING 1ST COVID VACCINE. STATES BACK,  UNDER BREASTS, GROIN.  + ITCHING. - STATES STARTED PRIOR TO ONSET OF RASH. DENIES PAIN, NO F/C. NO CHANGE IN SKIN CARE PRODUCTS  HTN - HAS NOT HAD MED TODAY. BP ELEVATED.  + DIET / EXERCISE COMPLIANCE. NO HEADACHE ,  NO DIZZINESS   DM-  TAKING MED. BLOOD SUGARS - LOW TO MID. + EXERCISE / DIET COMPLIANCE . LAST EYE EXAM  3/2020    HLP - TAKING LIPITOR . + DIET / EXERCISE COMPLIANCE. NO  MUSCLE CRAMPS / NO MUSCLE ACHES  ASTHMA - NO WHEEZING, NO SOB, NO INCREASED INHALER USE. [address] [place] - IMPROVED. NO COLD INTOLERANCE  GERD - TAKING MED OCCASIONALLY. DENIES HEARTBURN. CONSTIPATION - IMPROVED. MED HELPING       DENIES CP, No SOB, No PALPITATIONS, No COUGH, NO F/C  No ABD PAIN, No N/V, No DIARRHEA,  CONSTIPATION - IMPROVED, No MELENA, No HEMATOCHEZIA. No DYSURIA, No FREQ, No URGENCY, No HEMATURIA       PMH: REVIEWED AND UPDATED TODAY    PSH: REVIEWED AND UPDATED TODAY    SOCIAL HX: REVIEWED AND UPDATED TODAY    FAMILY HX: REVIEWED AND UPDATED TODAY    ALLERGY:  Amlodipine, Breo ellipta [fluticasone furoate-vilanterol], Lisinopril, Pcn [penicillins], and Synthroid [levothyroxine]    MEDS: REVIEWED  Prior to Visit Medications    Medication Sig Taking?  Authorizing Provider   metFORMIN (GLUCOPHAGE XR) 500 MG extended release tablet Take 2 tablets by mouth daily (with breakfast) Yes Jon Will MD   liothyronine (CYTOMEL) 25 MCG tablet Take 1 tablet by mouth daily Yes Jon Will MD   atorvastatin (LIPITOR) 20 MG tablet Take 1 tablet by mouth nightly Yes Jon Will MD omeprazole (PRILOSEC) 40 MG delayed release capsule Take 1 capsule by mouth daily Yes Nadir Ochoa MD   dilTIAZem (CARDIZEM CD) 180 MG extended release capsule Take 1 capsule by mouth daily Yes Nadir Ochoa MD   albuterol sulfate HFA (PROVENTIL HFA) 108 (90 Base) MCG/ACT inhaler Inhale 2 puffs into the lungs every 4 hours as needed for Wheezing or Shortness of Breath Yes Lisa Rodriguez MD   fluticasone (FLONASE) 50 MCG/ACT nasal spray SHAKE LIQUID AND USE 2 SPRAYS IN EACH NOSTRIL DAILY AS NEEDED FOR RHINITIS OR ALLERGIES Yes Nadir Ochoa MD   hydrOXYzine (ATARAX) 25 MG tablet Take 1 tablet by mouth every 8 hours as needed for Itching  Nadir Ochoa MD   Fluticasone furoate-vilanterol (BREO ELLIPTA) 200-25 MCG/INH AEPB inhaler Inhale 1 puff into the lungs daily Yes Lisa Rodriguez MD   meloxicam (MOBIC) 7.5 MG tablet Take 7.5 mg by mouth daily as needed for Pain Yes Historical Provider, MD   diclofenac sodium 1 % GEL Apply 4 g topically 4 times daily as needed for Pain Yes Nadir Ochoa MD   Blood Glucose Monitoring Suppl (ONE TOUCH ULTRA 2) w/Device KIT 1 kit by Does not apply route 2 times daily Yes Nadir Ochoa MD   blood glucose test strips (ONE TOUCH ULTRA TEST) strip 1 each by In Vitro route 2 times daily As needed.  Yes Nadir Ochoa MD   ONE TOUCH ULTRASOFT LANCETS MISC 1 each by Does not apply route 2 times daily Yes Nadir Ochoa MD   meclizine (ANTIVERT) 25 MG tablet Take 1 tablet by mouth 3 times daily as needed for Dizziness Yes Nadir Ochoa MD   cetirizine (ZYRTEC) 10 MG tablet Take 10 mg by mouth daily Yes Historical Provider, MD   Cyanocobalamin (B-12) 2500 MCG SUBL Place 2,500 mg under the tongue daily Yes Historical Provider, MD   aspirin 81 MG tablet Take 81 mg by mouth daily Yes Historical Provider, MD   montelukast (SINGULAIR) 10 MG tablet TAKE 1 TABLET BY MOUTH DAILY FOR ALLERGIES Yes Fredy Bradely MD   Lift Chair MISC by Does not apply route Yes Fredy Bradley MD Iron-Vitamins (GERITOL PO) Take 1 tablet by mouth daily. Yes Historical Provider, MD   multivitamin (ANTIOXIDANT;PROSIGHT) TABS per tablet Take 1 tablet by mouth daily. Yes Historical Provider, MD       ROS: COMPREHENSIVE ROS AS IN HX, REST -VE  History obtained from chart review and the patient    OBJECTIVE:   NURSING NOTE AND VITALS REVIEWED  BP (!) 170/90 (Site: Right Upper Arm, Position: Sitting, Cuff Size: Large Adult)   Pulse 86   Temp 97.3 °F (36.3 °C)   Wt 170 lb 12.8 oz (77.5 kg)   SpO2 97%   BMI 30.26 kg/m²     NO ACUTE DISTRESS    REPEAT BP: 164/90 (RT), NO ORTHOSTASIS     Body mass index is 30.26 kg/m². HEENT: NO PALLOR, ANICTERIC, PERRLA, EOMI, NO CONJUNCTIVAL ERYTHEMA,                 NO SINUS TENDERNESS  NECK:  SUPPLE, TRACHEA MIDLINE, NT, NO JVD, NO CB, NO LA, NO TM, NO STIFFNESS  CHEST: RESPY EFFORT NL, GOOD AE, NO W/R/C  HEART: S1S2+ REG, NO M/G/R  ABD: SOFT, NT, NO HSM, BS+  EXT: NO EDEMA, NT, PULSES +. DUNIA'S -VE  NEURO: ALERT AND ORIENTED X 3, NO MENINGEAL SIGNS, NO TREMORS, AMBULATING WITH A WALKER - + LIMP OTHERWISE, NO NEW FOCAL DEFICITS  PSYCH: FAIRLY GOOD AFFECT  BACK: NT, NO ROM, NO CVA TENDERNESS  SKIN: ERYTHEMATOUS MACULOPAPULAR RASH - GROIN LT > UNDER BREASTS JEWEL. FEW SPOTS - BACK  PREVIOUS LABS  REVIEWED AND D/W PT     ACCUCHECK: 160      ASSESSMENT / PLAN:     Diagnosis Orders   1. Pruritic rash  COUNSELLED. ADVISED MAINTAIN HYGIENE  START ON LOTRISONE 1-0.05 % cream PRN  ATARAX PRN - S/E D/W PT  MAKE CHANGES AS NEEDED. 2. Hypertension, uncontrolled  COUNSELLED. REPEAT BP AS ABOVE  PT DEFERRED MED CHANGE. ADVISED TO TAKE MED - ADVISED  COMPLIANCE  ADVISED LOW NA+ / DASH DIET/ EXERCISE. MONITOR. GOAL </= 120/80  F/U LABS  BP MONITOR ORDERED  ADVISED TO CALL IN BP READING  MAKE CHANGES AS NEEDED. 3. Type 2 diabetes mellitus without complication, without long-term current use of insulin ([address]  COUNSELLED.  CONTINUE MED  MED COMPLIANCE ADVISED

## 2021-03-04 LAB
A/G RATIO: 1.3 (ref 1.1–2.2)
ALBUMIN SERPL-MCNC: 4.2 G/DL (ref 3.4–5)
ALP BLD-CCNC: 80 U/L (ref 40–129)
ALT SERPL-CCNC: 11 U/L (ref 10–40)
ANION GAP SERPL CALCULATED.3IONS-SCNC: 12 MMOL/L (ref 3–16)
AST SERPL-CCNC: 16 U/L (ref 15–37)
BASOPHILS ABSOLUTE: 0 K/UL (ref 0–0.2)
BASOPHILS RELATIVE PERCENT: 0.1 %
BILIRUB SERPL-MCNC: <0.2 MG/DL (ref 0–1)
BUN BLDV-MCNC: 27 MG/DL (ref 7–20)
CALCIUM SERPL-MCNC: 10.4 MG/DL (ref 8.3–10.6)
CHLORIDE BLD-SCNC: 99 MMOL/L (ref 99–110)
CO2: 28 MMOL/L (ref 21–32)
CREAT SERPL-MCNC: 1 MG/DL (ref 0.6–1.2)
CREATININE URINE: 209.9 MG/DL (ref 28–259)
EOSINOPHILS ABSOLUTE: 0.3 K/UL (ref 0–0.6)
EOSINOPHILS RELATIVE PERCENT: 4.3 %
GFR AFRICAN AMERICAN: >60
GFR NON-AFRICAN AMERICAN: 52
GLOBULIN: 3.2 G/DL
GLUCOSE BLD-MCNC: 158 MG/DL (ref 70–99)
HCT VFR BLD CALC: 34.4 % (ref 36–48)
HEMOGLOBIN: 11.2 G/DL (ref 12–16)
LYMPHOCYTES ABSOLUTE: 0.9 K/UL (ref 1–5.1)
LYMPHOCYTES RELATIVE PERCENT: 11.9 %
MCH RBC QN AUTO: 26.5 PG (ref 26–34)
MCHC RBC AUTO-ENTMCNC: 32.6 G/DL (ref 31–36)
MCV RBC AUTO: 81.4 FL (ref 80–100)
MICROALBUMIN UR-MCNC: 4.8 MG/DL
MICROALBUMIN/CREAT UR-RTO: 22.9 MG/G (ref 0–30)
MONOCYTES ABSOLUTE: 0.3 K/UL (ref 0–1.3)
MONOCYTES RELATIVE PERCENT: 4 %
NEUTROPHILS ABSOLUTE: 5.7 K/UL (ref 1.7–7.7)
NEUTROPHILS RELATIVE PERCENT: 79.7 %
PDW BLD-RTO: 14.9 % (ref 12.4–15.4)
PLATELET # BLD: 206 K/UL (ref 135–450)
PMV BLD AUTO: 9.1 FL (ref 5–10.5)
POTASSIUM SERPL-SCNC: 4.2 MMOL/L (ref 3.5–5.1)
RBC # BLD: 4.23 M/UL (ref 4–5.2)
SODIUM BLD-SCNC: 139 MMOL/L (ref 136–145)
TOTAL PROTEIN: 7.4 G/DL (ref 6.4–8.2)
TSH REFLEX: 0.57 UIU/ML (ref 0.27–4.2)
WBC # BLD: 7.2 K/UL (ref 4–11)

## 2021-03-22 ENCOUNTER — OFFICE VISIT (OUTPATIENT)
Dept: PULMONOLOGY | Age: 86
End: 2021-03-22
Payer: MEDICARE

## 2021-03-22 VITALS
TEMPERATURE: 96.9 F | WEIGHT: 167 LBS | DIASTOLIC BLOOD PRESSURE: 82 MMHG | BODY MASS INDEX: 29.59 KG/M2 | HEIGHT: 63 IN | HEART RATE: 80 BPM | OXYGEN SATURATION: 98 % | SYSTOLIC BLOOD PRESSURE: 134 MMHG

## 2021-03-22 DIAGNOSIS — J45.40 MODERATE PERSISTENT ASTHMA WITHOUT COMPLICATION: Primary | ICD-10-CM

## 2021-03-22 DIAGNOSIS — R05.8 UPPER AIRWAY COUGH SYNDROME: ICD-10-CM

## 2021-03-22 PROCEDURE — G8417 CALC BMI ABV UP PARAM F/U: HCPCS | Performed by: INTERNAL MEDICINE

## 2021-03-22 PROCEDURE — 1123F ACP DISCUSS/DSCN MKR DOCD: CPT | Performed by: INTERNAL MEDICINE

## 2021-03-22 PROCEDURE — G8427 DOCREV CUR MEDS BY ELIG CLIN: HCPCS | Performed by: INTERNAL MEDICINE

## 2021-03-22 PROCEDURE — 1036F TOBACCO NON-USER: CPT | Performed by: INTERNAL MEDICINE

## 2021-03-22 PROCEDURE — 99213 OFFICE O/P EST LOW 20 MIN: CPT | Performed by: INTERNAL MEDICINE

## 2021-03-22 PROCEDURE — G8482 FLU IMMUNIZE ORDER/ADMIN: HCPCS | Performed by: INTERNAL MEDICINE

## 2021-03-22 PROCEDURE — 1090F PRES/ABSN URINE INCON ASSESS: CPT | Performed by: INTERNAL MEDICINE

## 2021-03-22 PROCEDURE — 4040F PNEUMOC VAC/ADMIN/RCVD: CPT | Performed by: INTERNAL MEDICINE

## 2021-03-22 NOTE — PROGRESS NOTES
Replaced by [facility]    Outpatient Follow Up Note    Subjective:   CHIEF COMPLAINT / HPI:     The patient is 80 y.o. female who presents today for 6 month follow up of upper airway cough syndrome and moderate persistent asthma. She remains on Breo 200 mcg MDI, Zyrtec and Flonase. No cough, wheezing, chest tightness or dyspnea on exertion. She uses her albuterol rescue inhaler approximately once a month. She is completed her to series vaccination for COVID-19. She had a sore arm after the second shot but otherwise did well. Past Medical History:    Past Medical History:   Diagnosis Date    Arthritis     Asthma     Cervical radiculopathy at C5 bilaterally and left C7 2/3/2016    Diabetes mellitus ([facility] [address]     Hyperlipidemia     Hypertension        Social History:    Social History     Tobacco Use   Smoking Status Never Smoker   Smokeless Tobacco Never Used       Current Medications:  Current Outpatient Medications on File Prior to Visit   Medication Sig Dispense Refill    BREO ELLIPTA 200-25 MCG/INH AEPB inhaler INHALE 1 PUFF INTO THE LUNGS DAILY 60 each 5    Blood Pressure Monitoring (BLOOD PRESS MONITOR/M-L CUFF) MISC 1 Device by Does not apply route daily 1 each 0    dilTIAZem (CARDIZEM CD) 180 MG extended release capsule Take 1 capsule by mouth daily 90 capsule 0    clotrimazole-betamethasone (LOTRISONE) 1-0.05 % cream Apply topically 2 times daily / PRN.  60 g 0    metFORMIN (GLUCOPHAGE XR) 500 MG extended release tablet Take 2 tablets by mouth daily (with breakfast) 180 tablet 0    liothyronine (CYTOMEL) 25 MCG tablet Take 1 tablet by mouth daily 90 tablet 0    atorvastatin (LIPITOR) 20 MG tablet Take 1 tablet by mouth nightly 90 tablet 0    omeprazole (PRILOSEC) 40 MG delayed release capsule Take 1 capsule by mouth daily 90 capsule 0    albuterol sulfate HFA (PROVENTIL HFA) 108 (90 Base) MCG/ACT inhaler Inhale 2 puffs into the lungs every 4 hours as needed for Wheezing or Shortness of Breath 1 Inhaler 5    fluticasone (FLONASE) 50 MCG/ACT nasal spray SHAKE LIQUID AND USE 2 SPRAYS IN EACH NOSTRIL DAILY AS NEEDED FOR RHINITIS OR ALLERGIES 1 Bottle 0    meloxicam (MOBIC) 7.5 MG tablet Take 7.5 mg by mouth daily as needed for Pain      diclofenac sodium 1 % GEL Apply 4 g topically 4 times daily as needed for Pain 1 Tube 0    Blood Glucose Monitoring Suppl (ONE TOUCH ULTRA 2) w/Device KIT 1 kit by Does not apply route 2 times daily 1 kit 0    blood glucose test strips (ONE TOUCH ULTRA TEST) strip 1 each by In Vitro route 2 times daily As needed. 100 each 3    ONE TOUCH ULTRASOFT LANCETS MISC 1 each by Does not apply route 2 times daily 100 each 3    cetirizine (ZYRTEC) 10 MG tablet Take 10 mg by mouth daily      Cyanocobalamin (B-12) 2500 MCG SUBL Place 2,500 mg under the tongue daily      aspirin 81 MG tablet Take 81 mg by mouth daily      montelukast (SINGULAIR) 10 MG tablet TAKE 1 TABLET BY MOUTH DAILY FOR ALLERGIES 30 tablet 3    Lift Chair MISC by Does not apply route 1 each 0    Iron-Vitamins (GERITOL PO) Take 1 tablet by mouth daily.  multivitamin (ANTIOXIDANT;PROSIGHT) TABS per tablet Take 1 tablet by mouth daily. No current facility-administered medications on file prior to visit.         REVIEW OF SYSTEMS:    CONSTITUTIONAL: Negative for fevers and chills  HEENT: Negative for oropharyngeal exudate, post nasal drip, sinus pain / pressure, nasal congestion, ear pain  RESPIRATORY:  See HPI  CARDIOVASCULAR: Negative for chest pain, palpitations, edema  GASTROINTESTINAL: Negative for nausea, vomiting, diarrhea, constipation and abdominal pain  HEMATOLOGICAL: Negative for adenopathy  SKIN: Negative for clubbing, cyanosis, skin lesions  EXTREMITIES: Negative for weakness, decreased ROM  NEUROLOGICAL: Negative for unilateral weakness, speech or gait abnormalities    Objective:   PHYSICAL EXAM:        VITALS:  /82 (Site: Right Upper Arm, Position: Sitting, Cuff Size: Medium Adult)   Pulse 80   Temp 96.9 °F (36.1 °C) (Infrared)   Ht 5' 3\" (1.6 m)   Wt 167 lb (75.8 kg)   SpO2 98%   BMI 29.58 kg/m²     CONSTITUTIONAL:  Awake, alert, cooperative, no apparent distress, and appears stated age  HEENT: No oropharyngeal exudate, PERRL, no cervical adenopathy, no tracheal deviation, thyroid size normal  LUNGS:  No increased work of breathing and clear to auscultation, no crackles or wheezing  CARDIOVASCULAR:  normal S1 and S2 and no JVD  ABDOMEN:  Normal bowel sounds, non-distended and non-tender to palpation  EXT: No edema, no calf tenderness. Pulses are present bilaterally. NEUROLOGIC:  Mental Status Exam:  Level of Alertness:   awake  Orientation:   person, place, time. SKIN:  normal skin color, texture, turgor, no redness, warmth, or swelling     DATA:      Radiology Review:  Pertinent images / reports were reviewed as a part of this visit. CT Chest 1/30 reveals the following:  CT chest without IV including inspiration and expiration images       HISTORY: Chronic cough        Routine examination was carried out from thoracic inlet to upper   abdomen without IV contrast. Repeat imaging was then performed   using high-resolution bone algorithm during inspiration and   expiration       Trachea and mainstem bronchi are patent. Subpleural calcified   granuloma located in the right middle lobe. Another calcified   granulomas located within the lingula. . No consolidation or   pleural effusion. No air trapping.       Normal sized lymph nodes are located in the AP window and   pretracheal space. No hilar or anterior mediastinal   lymphadenopathy given limitation of no IV contrast. No axillary   lymphadenopathy.       Cardiac size normal. Adrenals not enlarged. Visualized sections of   liver and spleen normal. 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 Total lung capacity and residual volume were in the normal  range . Diffusing capacity was decreased prior to correction, but corrects  to normal with alveolar lung volumes. Methacholine challenge was performed  and the patient had a 32% drop in the FEV1 at the third dose of  methacholine, indicating a positive study.     CONCLUSION:  Mild obstructive baseline defect with positive methacholine  challenge. 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       Assessment:      Diagnosis Orders   1. Moderate persistent asthma without complication     2. Upper airway cough syndrome         Plan:       1. Continue Breo 200 mcg MDI and prn albuterol  2. Continue Flonase and Zyrtec  3. She has never smoked. 4.  She is up-to-date with her COVID-19, Pneumovax, and Prevnar 13 vaccination  5.   RTC 6 months or sooner if needed, especially if symptoms of poorly controlled asthma emerge which we went over

## 2021-03-31 ENCOUNTER — OFFICE VISIT (OUTPATIENT)
Dept: INTERNAL MEDICINE CLINIC | Age: 86
End: 2021-03-31
Payer: MEDICARE

## 2021-03-31 VITALS
BODY MASS INDEX: 29.45 KG/M2 | WEIGHT: 166.2 LBS | DIASTOLIC BLOOD PRESSURE: 80 MMHG | SYSTOLIC BLOOD PRESSURE: 138 MMHG | HEIGHT: 63 IN | TEMPERATURE: 97.7 F | OXYGEN SATURATION: 95 % | HEART RATE: 78 BPM

## 2021-03-31 DIAGNOSIS — K21.9 GASTROESOPHAGEAL REFLUX DISEASE WITHOUT ESOPHAGITIS: ICD-10-CM

## 2021-03-31 DIAGNOSIS — I10 ESSENTIAL HYPERTENSION: ICD-10-CM

## 2021-03-31 DIAGNOSIS — J45.40 MODERATE PERSISTENT ASTHMA WITHOUT COMPLICATION: ICD-10-CM

## 2021-03-31 DIAGNOSIS — E03.9 ACQUIRED HYPOTHYROIDISM: ICD-10-CM

## 2021-03-31 DIAGNOSIS — E11.65 TYPE 2 DIABETES MELLITUS WITH HYPERGLYCEMIA, WITHOUT LONG-TERM CURRENT USE OF INSULIN (HCC): Primary | ICD-10-CM

## 2021-03-31 DIAGNOSIS — E78.49 OTHER HYPERLIPIDEMIA: ICD-10-CM

## 2021-03-31 DIAGNOSIS — L28.2 PRURITIC RASH: ICD-10-CM

## 2021-03-31 LAB
CHP ED QC CHECK: NORMAL
GLUCOSE BLD-MCNC: 274 MG/DL

## 2021-03-31 PROCEDURE — 99214 OFFICE O/P EST MOD 30 MIN: CPT | Performed by: INTERNAL MEDICINE

## 2021-03-31 PROCEDURE — 82962 GLUCOSE BLOOD TEST: CPT | Performed by: INTERNAL MEDICINE

## 2021-03-31 PROCEDURE — 1036F TOBACCO NON-USER: CPT | Performed by: INTERNAL MEDICINE

## 2021-03-31 PROCEDURE — 1090F PRES/ABSN URINE INCON ASSESS: CPT | Performed by: INTERNAL MEDICINE

## 2021-03-31 PROCEDURE — G8417 CALC BMI ABV UP PARAM F/U: HCPCS | Performed by: INTERNAL MEDICINE

## 2021-03-31 PROCEDURE — 1123F ACP DISCUSS/DSCN MKR DOCD: CPT | Performed by: INTERNAL MEDICINE

## 2021-03-31 PROCEDURE — G8482 FLU IMMUNIZE ORDER/ADMIN: HCPCS | Performed by: INTERNAL MEDICINE

## 2021-03-31 PROCEDURE — G8427 DOCREV CUR MEDS BY ELIG CLIN: HCPCS | Performed by: INTERNAL MEDICINE

## 2021-03-31 PROCEDURE — 4040F PNEUMOC VAC/ADMIN/RCVD: CPT | Performed by: INTERNAL MEDICINE

## 2021-03-31 RX ORDER — CLOBETASOL PROPIONATE 0.5 MG/G
OINTMENT TOPICAL
Qty: 60 G | Refills: 0 | Status: SHIPPED | OUTPATIENT
Start: 2021-03-31 | End: 2021-04-19 | Stop reason: SDUPTHER

## 2021-03-31 RX ORDER — LIOTHYRONINE SODIUM 25 UG/1
25 TABLET ORAL DAILY
Qty: 90 TABLET | Refills: 0 | Status: SHIPPED | OUTPATIENT
Start: 2021-03-31 | End: 2021-06-09 | Stop reason: SDUPTHER

## 2021-03-31 RX ORDER — GLIMEPIRIDE 2 MG/1
2 TABLET ORAL EVERY MORNING
Qty: 30 TABLET | Refills: 1 | Status: SHIPPED | OUTPATIENT
Start: 2021-03-31 | End: 2021-05-28

## 2021-03-31 RX ORDER — ATORVASTATIN CALCIUM 20 MG/1
20 TABLET, FILM COATED ORAL NIGHTLY
Qty: 90 TABLET | Refills: 0 | Status: SHIPPED | OUTPATIENT
Start: 2021-03-31 | End: 2021-06-09 | Stop reason: SDUPTHER

## 2021-03-31 NOTE — PATIENT INSTRUCTIONS
TAKE MED AS ADVISED    DIET/ EXERCISE.     FOLLOW UP  PREVIOUS / AS NEEDED    FOLLOW UP FOR EYE EXAM, DERMATOLOGY

## 2021-03-31 NOTE — PROGRESS NOTES
SUBJECTIVE:  Reena Valdivia is a 80 y.o. female HERE FOR   Chief Complaint   Patient presents with    Diabetes     Pt states Blood sug has been high the last couple of days , it got as high as 300.  Rash    Extremity Weakness     rights side and both legs . PT HERE FOR EVAL  PT HERE WITH DAUGHTER       DM-  TAKING MED. HBS - HIGH 100'S 'S. + EXERCISE / DIET COMPLIANCE . LAST EYE EXAM  3/2020  HTN - TAKING MED. BP NOTED.  + DIET / EXERCISE COMPLIANCE. OCC HEADACHE, NO DIZZINESS  HLP - TAKING LIPITOR . + DIET / EXERCISE COMPLIANCE. NO  MUSCLE CRAMPS / NO MUSCLE ACHES  RASH  -  INCREASED ON BACK.  + ITCHING. DENIES PAIN, NO F/C. NO CHANGE IN SKIN CARE PRODUCTS  ASTHMA - NO WHEEZING, NO SOB, NO INCREASED INHALER USE. HYPOTHYROIDISM -[address] [place] [address]. NO COLD INTOLERANCE  GERD - TAKING MED OCCASIONALLY. DENIES HEARTBURN. NO NEW WEAKNESS     DENIES CP, No SOB, No PALPITATIONS, No COUGH, NO F/C  No ABD PAIN, No N/V, OCC DIARRHEA,  CONSTIPATION - IMPROVED, No MELENA, No HEMATOCHEZIA. No DYSURIA, No FREQ, No URGENCY, No HEMATURIA       PMH: REVIEWED AND UPDATED TODAY    PSH: REVIEWED AND UPDATED TODAY    SOCIAL HX: REVIEWED AND UPDATED TODAY    FAMILY HX: REVIEWED AND UPDATED TODAY    ALLERGY:  Amlodipine, Breo ellipta [fluticasone furoate-vilanterol], Lisinopril, Pcn [penicillins], and Synthroid [levothyroxine]    MEDS: REVIEWED  Prior to Visit Medications    Medication Sig Taking? Authorizing Provider   BREO ELLIPTA 200-25 MCG/INH AEPB inhaler INHALE 1 PUFF INTO THE LUNGS DAILY  Nusrat Zepeda MD   Blood Pressure Monitoring (BLOOD PRESS MONITOR/M-L CUFF) MISC 1 Device by Does not apply route daily  Sylvia Balderrama MD   dilTIAZem (CARDIZEM CD) 180 MG extended release capsule Take 1 capsule by mouth daily  Sylvia Balderrama MD   clotrimazole-betamethasone (LOTRISONE) 1-0.05 % cream Apply topically 2 times daily / PRN.   Sylvia Balderrama MD   metFORMIN (GLUCOPHAGE XR) 500 MG extended release tablet Take 2 tablets by mouth daily (with breakfast)  Bright Dsouza MD   liothyronine (CYTOMEL) 25 MCG tablet Take 1 tablet by mouth daily  Bright Dsouza MD   atorvastatin (LIPITOR) 20 MG tablet Take 1 tablet by mouth nightly  Bright Dsouza MD   omeprazole (PRILOSEC) 40 MG delayed release capsule Take 1 capsule by mouth daily  Bright Dsouza MD   albuterol sulfate HFA (PROVENTIL HFA) 108 (90 Base) MCG/ACT inhaler Inhale 2 puffs into the lungs every 4 hours as needed for Wheezing or Shortness of Breath  Umm Brenner MD   fluticasone (FLONASE) 50 MCG/ACT nasal spray SHAKE LIQUID AND USE 2 SPRAYS IN EACH NOSTRIL DAILY AS NEEDED FOR RHINITIS OR ALLERGIES  Bright Dsouza MD   meloxicam (MOBIC) 7.5 MG tablet Take 7.5 mg by mouth daily as needed for Pain  Historical Provider, MD   diclofenac sodium 1 % GEL Apply 4 g topically 4 times daily as needed for Pain  Bright Dsouza MD   Blood Glucose Monitoring Suppl (ONE TOUCH ULTRA 2) w/Device KIT 1 kit by Does not apply route 2 times daily  Bright Dsouza MD   blood glucose test strips (ONE TOUCH ULTRA TEST) strip 1 each by In Vitro route 2 times daily As needed. Bright Dsouza MD   ONE TOUCH ULTRASOFT LANCETS MISC 1 each by Does not apply route 2 times daily  Bright Dsouza MD   cetirizine (ZYRTEC) 10 MG tablet Take 10 mg by mouth daily  Historical Provider, MD   Cyanocobalamin (B-12) 2500 MCG SUBL Place 2,500 mg under the tongue daily  Historical Provider, MD   aspirin 81 MG tablet Take 81 mg by mouth daily  Historical Provider, MD   montelukast (SINGULAIR) 10 MG tablet TAKE 1 TABLET BY MOUTH DAILY FOR ALLERGIES  Rasheed Hernandez MD   Lift Chair MISC by Does not apply route  Rasheed Hernandez MD   Iron-Vitamins (GERITOL PO) Take 1 tablet by mouth daily. Historical Provider, MD   multivitamin (ANTIOXIDANT;PROSIGHT) TABS per tablet Take 1 tablet by mouth daily.     Historical Provider, MD     '  ROS: COMPREHENSIVE ROS AS IN HX, REST -VE History obtained from chart review and the patient    OBJECTIVE:   NURSING NOTE AND VITALS REVIEWED  BP (!) 142/80 (Site: Left Upper Arm, Position: Sitting, Cuff Size: Medium Adult)   Pulse 78   Temp 97.7 °F (36.5 °C)   Ht 5' 3\" (1.6 m)   Wt 166 lb 3.2 oz (75.4 kg)   SpO2 95%   BMI 29.44 kg/m²     NO ACUTE DISTRESS    REPEAT BP: 138/80 (RT), NO ORTHOSTASIS     Body mass index is 29.44 kg/m². HEENT: NO PALLOR, ANICTERIC, PERRLA, EOMI, NO CONJUNCTIVAL ERYTHEMA,                 NO SINUS TENDERNESS  NECK:  SUPPLE, TRACHEA MIDLINE, NT, NO JVD, NO CB, NO LA, NO TM, NO STIFFNESS  CHEST: RESPY EFFORT NL, GOOD AE, NO W/R/C  HEART: S1S2+ REG, NO M/G/R  ABD: SOFT, NT, NO HSM, BS+  EXT: NO EDEMA, NT, PULSES +. DUNIA'S -VE  NEURO: ALERT AND ORIENTED X 3, NO MENINGEAL SIGNS, NO TREMORS, AMBULATING WITH A WALKER - + LIMP, NO NEW FOCAL DEFICITS  PSYCH: FAIRLY GOOD AFFECT  BACK: NT, NO ROM, NO CVA TENDERNESS  SKIN: MULTIPLE HYPERPIGMENTED MACULAR SCALY PATCHES - BACK            ERYTHEMATOUS MACULAR SPOTS UNDER BREAST - IMPROVING      PREVIOUS LABS REVIEWED AND D/W PT    ACCUCHECK: 274      ASSESSMENT / PLAN:     Diagnosis Orders   1. Type 2 diabetes mellitus with hyperglycemia, without long-term current use of insulin (HCC)  COUNSELLED. HYPERGLYCEMIA. ADD AMARYL 2 MG DAILY  CONTINUE GLUCOPHAGE XR  MED COMPLIANCE ADVISED  DIET/ EXERCISE ADVISED. MONITOR. ADVISED ON HYDRATION  GOAL FBS 80- 120, HBA1C < 7  ADVISED ON HOME BLOOD SUGAR MONITORING  ADVISED TO CALL BS READING  F/U DM EYE EXAM.  MAKE CHANGES AS NEEDED. 2. Essential hypertension  COUNSELLED. REPEAT BP AS ABOVE  CONTINUE MED. ADVISED LOW NA+ / DASH DIET/ EXERCISE. MONITOR. GOAL </= 120/80  MAKE CHANGES AS NEEDED. 3. Other hyperlipidemia  COUNSELLED. ADVISED LOW FAT / CHOL DIET/ EXERCISE.  MONITOR ON LIPITOR. GOALS D/W PT.  MAKE CHANGES AS NEEDED. 4. Pruritic rash  COUNSELLED.  MAINTAIN HYGIENE  START ON clobetasol (TEMOVATE) 0.05 % ointment FOR RASH ON BACK  INCREASED FROM PREVIOUS - RFER DERM FOR FURTHER EVAL  ADVISED ATARAX PRN - PT DEFERRED  MAKE CHANGES AS NEEDED. 5. Moderate persistent asthma without complication  COUNSELLED. CONTINUE INHALERS - DENIES ALLERGY TO BREO. MONITOR. MONITOR FOR TRIGGERS- ENVIRONMENTAL MODIFICATION. MAKE CHANGES AS NEEDED. 6. Acquired hypothyroidism  COUNSELLED. STABLE. CONTINUE CYTOMEL 25 MCG . MONITOR  MAKE CHANGES AS NEEDED. 7. Gastroesophageal reflux disease without esophagitis  COUNSELLED. CONTINUE MGT. ANTIREFLUX PRECAUTIONS ADVISED. MONITOR. MAKE CHANGES AS NEEDED.                          MEDICATION SIDE EFFECTS D/W PATIENT    RETURN TO CLINIC PREVIOUS / PRN    FOLLOW UP FOR EYE EXAM, DERMATOLOGY

## 2021-04-01 DIAGNOSIS — E11.9 TYPE 2 DIABETES MELLITUS WITHOUT COMPLICATION, WITHOUT LONG-TERM CURRENT USE OF INSULIN (HCC): ICD-10-CM

## 2021-04-19 ENCOUNTER — OFFICE VISIT (OUTPATIENT)
Dept: INTERNAL MEDICINE CLINIC | Age: 86
End: 2021-04-19
Payer: MEDICARE

## 2021-04-19 VITALS
HEART RATE: 87 BPM | HEIGHT: 63 IN | WEIGHT: 169.6 LBS | TEMPERATURE: 97.4 F | OXYGEN SATURATION: 97 % | SYSTOLIC BLOOD PRESSURE: 130 MMHG | BODY MASS INDEX: 30.05 KG/M2 | DIASTOLIC BLOOD PRESSURE: 80 MMHG

## 2021-04-19 DIAGNOSIS — I10 ESSENTIAL HYPERTENSION: ICD-10-CM

## 2021-04-19 DIAGNOSIS — J45.40 MODERATE PERSISTENT ASTHMA WITHOUT COMPLICATION: ICD-10-CM

## 2021-04-19 DIAGNOSIS — Z00.00 ROUTINE GENERAL MEDICAL EXAMINATION AT A HEALTH CARE FACILITY: Primary | ICD-10-CM

## 2021-04-19 DIAGNOSIS — K21.9 GASTROESOPHAGEAL REFLUX DISEASE WITHOUT ESOPHAGITIS: ICD-10-CM

## 2021-04-19 DIAGNOSIS — L28.2 PRURITIC RASH: ICD-10-CM

## 2021-04-19 DIAGNOSIS — E78.49 OTHER HYPERLIPIDEMIA: ICD-10-CM

## 2021-04-19 DIAGNOSIS — E03.9 ACQUIRED HYPOTHYROIDISM: ICD-10-CM

## 2021-04-19 LAB
CHP ED QC CHECK: NORMAL
GLUCOSE BLD-MCNC: 107 MG/DL
HBA1C MFR BLD: 8.3 %

## 2021-04-19 PROCEDURE — G0438 PPPS, INITIAL VISIT: HCPCS | Performed by: INTERNAL MEDICINE

## 2021-04-19 PROCEDURE — 1123F ACP DISCUSS/DSCN MKR DOCD: CPT | Performed by: INTERNAL MEDICINE

## 2021-04-19 PROCEDURE — 82962 GLUCOSE BLOOD TEST: CPT | Performed by: INTERNAL MEDICINE

## 2021-04-19 PROCEDURE — 4040F PNEUMOC VAC/ADMIN/RCVD: CPT | Performed by: INTERNAL MEDICINE

## 2021-04-19 PROCEDURE — 3052F HG A1C>EQUAL 8.0%<EQUAL 9.0%: CPT | Performed by: INTERNAL MEDICINE

## 2021-04-19 PROCEDURE — 83036 HEMOGLOBIN GLYCOSYLATED A1C: CPT | Performed by: INTERNAL MEDICINE

## 2021-04-19 RX ORDER — OMEPRAZOLE 40 MG/1
40 CAPSULE, DELAYED RELEASE ORAL DAILY
Qty: 90 CAPSULE | Refills: 0 | Status: SHIPPED | OUTPATIENT
Start: 2021-04-19 | End: 2021-07-19 | Stop reason: SDUPTHER

## 2021-04-19 RX ORDER — CLOBETASOL PROPIONATE 0.5 MG/G
OINTMENT TOPICAL
Qty: 60 G | Refills: 0 | Status: SHIPPED | OUTPATIENT
Start: 2021-04-19 | End: 2021-06-09 | Stop reason: SDUPTHER

## 2021-04-19 RX ORDER — SITAGLIPTIN AND METFORMIN HYDROCHLORIDE 1000; 50 MG/1; MG/1
1 TABLET, FILM COATED, EXTENDED RELEASE ORAL DAILY
Qty: 90 TABLET | Refills: 0 | Status: SHIPPED | OUTPATIENT
Start: 2021-04-19 | End: 2021-07-19 | Stop reason: SDUPTHER

## 2021-04-19 SDOH — ECONOMIC STABILITY: INCOME INSECURITY: HOW HARD IS IT FOR YOU TO PAY FOR THE VERY BASICS LIKE FOOD, HOUSING, MEDICAL CARE, AND HEATING?: PATIENT REFUSED

## 2021-04-19 SDOH — ECONOMIC STABILITY: FOOD INSECURITY: WITHIN THE PAST 12 MONTHS, THE FOOD YOU BOUGHT JUST DIDN'T LAST AND YOU DIDN'T HAVE MONEY TO GET MORE.: PATIENT REFUSED

## 2021-04-19 ASSESSMENT — PATIENT HEALTH QUESTIONNAIRE - PHQ9
SUM OF ALL RESPONSES TO PHQ QUESTIONS 1-9: 0
SUM OF ALL RESPONSES TO PHQ QUESTIONS 1-9: 0
SUM OF ALL RESPONSES TO PHQ9 QUESTIONS 1 & 2: 0
SUM OF ALL RESPONSES TO PHQ QUESTIONS 1-9: 0

## 2021-04-19 NOTE — PROGRESS NOTES
Medicare Annual Wellness Visit  Name: Dixie Mead Date: 2021   MRN: [id number] Sex: Female   Age: 80 y.o. Ethnicity: Non-/Non    : 1930 Race: Black      Mehnaz Diana is here for Diabetes and Medicare AWV  PT HERE WITH DAUGHTER    LAST PHYSICAL > 1 YR    DM-  TAKING MEDS. HBS - LOW 'S. + EXERCISE / DIET COMPLIANCE . LAST EYE EXAM  ?   HTN - TAKING MED. BP ELEVATED.  + DIET / EXERCISE COMPLIANCE. NO HEADACHE, NO DIZZINESS  HLP - TAKING LIPITOR . + DIET / EXERCISE COMPLIANCE. NO  MUSCLE CRAMPS / NO MUSCLE ACHES  ASTHMA - NO WHEEZING, NO SOB, NO INCREASED INHALER USE. HYPOTHYROIDISM -[address] [facility]. NO COLD INTOLERANCE  GERD - TAKING MED OCCASIONALLY. DENIES HEARTBURN. RASH  - [place]. ITCHING - RESOLVED. DENIES PAIN, NO F/C.     DENIES CP, No SOB, No PALPITATIONS, No COUGH, NO F/C  No ABD PAIN, No N/V, OCC DIARRHEA,  CONSTIPATION - IMPROVED, No MELENA, No HEMATOCHEZIA. No DYSURIA, No FREQ, No URGENCY, No HEMATURIA       ROS: COMPREHENSIVE ROS AS IN HX, REST -VE  History obtained from chart review, daughter and the patient      Screenings for behavioral, psychosocial and functional/safety risks, and cognitive dysfunction are all negative except as indicated below. These results, as well as other patient data from the [address] form, are documented in Flowsheets linked to this Encounter. Allergies   Allergen Reactions    Amlodipine Itching    Breo Ellipta [Fluticasone Furoate-Vilanterol]     Lisinopril Other (See Comments)     cough    Pcn [Penicillins] Swelling    Synthroid [Levothyroxine]      ITCHING         Prior to Visit Medications    Medication Sig Taking? Authorizing Provider   blood glucose test strips (ONE TOUCH ULTRA TEST) strip 1 each by In Vitro route 2 times daily As needed.  Yes Nancy Greco MD   glimepiride (AMARYL) 2 MG tablet Take 1 tablet by mouth every morning Yes Tirso Velasquez MD   clobetasol (TEMOVATE) 0.05 % ointment Apply topically 2 times daily / PRN. Yes Tirso Velasquez MD   liothyronine (CYTOMEL) 25 MCG tablet Take 1 tablet by mouth daily Yes Tirso Velasquez MD   atorvastatin (LIPITOR) 20 MG tablet Take 1 tablet by mouth nightly Yes Tirso Velasquez MD   BREO ELLIPTA 200-25 MCG/INH AEPB inhaler INHALE 1 PUFF INTO THE LUNGS DAILY Yes Kaleigh Watson MD   Blood Pressure Monitoring (BLOOD PRESS MONITOR/M-L CUFF) MISC 1 Device by Does not apply route daily Yes Tirso Velasquez MD   dilTIAZem (CARDIZEM CD) 180 MG extended release capsule Take 1 capsule by mouth daily Yes Tirso Velasquez MD   clotrimazole-betamethasone (LOTRISONE) 1-0.05 % cream Apply topically 2 times daily / PRN.  Yes Tirso Velasquez MD   metFORMIN (GLUCOPHAGE XR) 500 MG extended release tablet Take 2 tablets by mouth daily (with breakfast) Yes Tirso Velasquez MD   omeprazole (PRILOSEC) 40 MG delayed release capsule Take 1 capsule by mouth daily Yes Tirso Velasquez MD   albuterol sulfate HFA (PROVENTIL HFA) 108 (90 Base) MCG/ACT inhaler Inhale 2 puffs into the lungs every 4 hours as needed for Wheezing or Shortness of Breath Yes Kaleigh Watson MD   fluticasone (FLONASE) 50 MCG/ACT nasal spray SHAKE LIQUID AND USE 2 SPRAYS IN EACH NOSTRIL DAILY AS NEEDED FOR RHINITIS OR ALLERGIES Yes Tirso Velasquez MD   meloxicam (MOBIC) 7.5 MG tablet Take 7.5 mg by mouth daily as needed for Pain Yes Historical Provider, MD   diclofenac sodium 1 % GEL Apply 4 g topically 4 times daily as needed for Pain Yes Tirso Velasquez MD   Blood Glucose Monitoring Suppl (ONE TOUCH ULTRA 2) w/Device KIT 1 kit by Does not apply route 2 times daily Yes Tirso Velasquez MD   ONE TOUCH ULTRASOFT LANCETS MISC 1 each by Does not apply route 2 times daily Yes Tirso Velasquez MD   cetirizine (ZYRTEC) 10 MG tablet Take 10 mg by mouth daily Yes Historical Provider, MD   Cyanocobalamin (B-12) 2500 MCG SUBL Place 2,500 mg under the tongue daily Yes Historical Provider, MD   aspirin 81 MG tablet Take 81 mg by mouth daily Yes Historical Provider, MD   montelukast (SINGULAIR) 10 MG tablet TAKE 1 TABLET BY MOUTH DAILY FOR ALLERGIES Yes Baldemar Gee MD   Lift Chair [address] [facility] by Does not apply route Yes Baldemar Gee MD   Iron-Vitamins (GERITOL PO) Take 1 tablet by mouth daily. Yes Historical Provider, MD   multivitamin (ANTIOXIDANT;PROSIGHT) TABS per tablet Take 1 tablet by mouth daily.    Yes Historical Provider, MD         Past Medical History:   Diagnosis Date    Arthritis     Asthma     Cervical radiculopathy at C5 bilaterally and left C7 2/3/2016    Diabetes mellitus ([address]     Hyperlipidemia     Hypertension        Past Surgical History:   Procedure Laterality Date    BUNIONECTOMY  11/11/11    EYE SURGERY      bilateral cataracts    FOOT SURGERY      left heel spurs    HAMMER TOE SURGERY  11/11/11    HYSTERECTOMY      JOINT REPLACEMENT      sriram knees    VAGINAL PROLAPSE REPAIR           Family History   Problem Relation Age of Onset    Cancer Mother         uterus    Stroke Father     High Blood Pressure Father     Cancer Other         daughter - pancreatic       CareTeam (Including outside providers/suppliers regularly involved in providing care):   Patient Care Team:  Lolita Redding MD as PCP - General (Internal Medicine)  Lolita Redding MD as PCP - [facility] Empaneled Provider  Sandhya Schaffer MD as Surgeon (Orthopedic Surgery)  Abdi Collier MD as Consulting Physician (Obstetrics & Gynecology)  Andrea oRe MD as Consulting Physician (Pulmonology)    Wt Readings from Last 3 Encounters:   04/19/21 169 lb 9.6 oz (76.9 kg)   03/31/21 166 lb 3.2 oz (75.4 kg)   03/22/21 167 lb (75.8 kg)     Vitals:    04/19/21 1057   BP: (!) 160/85   Site: Left Upper Arm   Position: Sitting   Cuff Size: Small Adult   Pulse: 87   Temp: 97.4 °F (36.3 °C)   SpO2: 97%   Weight: 169 lb 9.6 oz (76.9 kg)   Height: 5' 3\" (1.6 m)     Based upon direct of your daily activities because of your eyesight?: No  Have you had an eye exam within the past year?: Yes  Hearing/Vision Interventions:  · Hearing concerns:  patient declines any further evaluation/treatment for hearing issues     ADL:  ADLs  In the past 7 days, did you need help from others to perform any of the following everyday activities? Eating, dressing, grooming, bathing, toileting, or walking/balance?: (!) Dressing, Walking/Balance  In the past 7 days, did you need help from others to take care of any of the following? Laundry, housekeeping, banking/finances, shopping, telephone use, food preparation, transportation, or taking medications?: [employer], Housekeeping, Banking/Finances, Shopping, Food Preparation, Transportation  ADL Interventions:  ·  [address].  STATES DAUGHTER HELPING - COPING WELL PER PT    Personalized Preventive Plan   Current Health Maintenance Status  Immunization History   Administered Date(s) Administered    COVID-19, Moderna, PF, 100mcg/0.5mL 01/29/2021, 02/26/2021    Influenza Vaccine, unspecified formulation 11/08/2010, 10/12/2011, 09/06/2012, 08/29/2013, 09/30/2014    Influenza Virus Vaccine 11/01/2014, 10/12/2015    Influenza Whole 10/12/2011, 09/30/2014, 11/01/2014    Influenza, High Dose (Fluzone 65 yrs and older) 12/19/2016, 08/28/2017, 01/16/2019    Influenza, Triv, inactivated, subunit, adjuvanted, IM (Fluad 65 yrs and older) 09/10/2019, 09/17/2020    Pneumococcal Conjugate 13-valent ([id number]) 05/09/2016    Pneumococcal Polysaccharide ([id number]) 01/01/2004, 11/01/2014    Tdap (Boostrix, Adacel) 07/07/2015        Health Maintenance   Topic Date Due    Shingles Vaccine (1 of 2) Never done   [employer] Visit (AWV)  Never done    Lipid screen  11/27/2021    TSH testing  03/03/2022    DTaP/Tdap/Td vaccine (2 - Td) 07/07/2025    Flu vaccine  Completed    Pneumococcal 65+ years Vaccine  Completed    COVID-19 Vaccine  Completed    MONITOR. GOAL </= 120/80  MAKE CHANGES AS NEEDED. 4. Other hyperlipidemia  COUNSELLED. ADVISED LOW FAT / CHOL DIET/ EXERCISE.  MONITOR ON LIPITOR. GOALS D/W PT.  MAKE CHANGES AS NEEDED. 5. Moderate persistent asthma without complication  COUNSELLED. STABLE. CONTINUE INHALERS. MONITOR. MONITOR FOR TRIGGERS- ENVIRONMENTAL MODIFICATION. MAKE CHANGES AS NEEDED. 6. Acquired hypothyroidism  COUNSELLED. MONITOR ON CYTOMEL. MAKE CHANGES AS NEEDED       7. Gastroesophageal reflux disease without esophagitis  COUNSELLED. CONTINUE MGT. ANTIREFLUX PRECAUTIONS ADVISED. MONITOR. MAKE CHANGES AS NEEDED. 8. Pruritic rash  COUNSELLED. IMPROVING. clobetasol (TEMOVATE) 0.05 % ointment PRN. MONITOR  MAKE CHANGES AS NEEDED. Recommendations for Preventive Services Due: see orders and patient instructions/AVS.    Recommended screening schedule for the next 5-10 years is provided to the patient in written form: see Patient Instructions/AVS.    Loretta Mingo was seen today for diabetes and medicare awv.           MEDICATION SIDE EFFECTS D/W PATIENT    PT STABLE AT TIME OF D/C.    RETURN TO CLINIC WITHIN 2-3 MONTHS / PRN

## 2021-04-19 NOTE — PATIENT INSTRUCTIONS
TAKE MED AS ADVISED    DIET/ EXERCISE. FOLLOW UP WITHIN  2-3 MONTHS / AS NEEDED      Personalized Preventive Plan for Mary Macias - 4/19/2021  Medicare offers a range of preventive health benefits. Some of the tests and screenings are paid in full while other may be subject to a deductible, co-insurance, and/or copay. Some of these benefits include a comprehensive review of your medical history including lifestyle, illnesses that may run in your family, and various assessments and screenings as appropriate. After reviewing your medical record and screening and assessments performed today your provider may have ordered immunizations, labs, imaging, and/or referrals for you. A list of these orders (if applicable) as well as your Preventive Care list are included within your After Visit Summary for your review. Other Preventive Recommendations:    · A preventive eye exam performed by an eye specialist is recommended every 1-2 years to screen for glaucoma; cataracts, macular degeneration, and other eye disorders. · A preventive dental visit is recommended every 6 months. · Try to get at least 150 minutes of exercise per week or 10,000 steps per day on a pedometer . · Order or download the FREE \"Exercise & Physical Activity: Your Everyday Guide\" from [employer]. Call 1-366.186.2026 or search [employer] online. · You need 5266-1581 mg of calcium and 2419-7657 IU of vitamin D per day. It is possible to meet your calcium requirement with diet alone, but a vitamin D supplement is usually necessary to meet this goal.  · When exposed to the sun, use a sunscreen that protects against both UVA and UVB radiation with an SPF of 30 or greater. Reapply every 2 to 3 hours or after sweating, drying off with a towel, or swimming. · Always wear a seat belt when traveling in a car. Always wear a helmet when riding a bicycle or motorcycle.

## 2021-05-26 DIAGNOSIS — E11.65 TYPE 2 DIABETES MELLITUS WITH HYPERGLYCEMIA, WITHOUT LONG-TERM CURRENT USE OF INSULIN (HCC): ICD-10-CM

## 2021-05-26 DIAGNOSIS — E11.9 TYPE 2 DIABETES MELLITUS WITHOUT COMPLICATION, WITHOUT LONG-TERM CURRENT USE OF INSULIN (HCC): ICD-10-CM

## 2021-05-28 DIAGNOSIS — I10 HYPERTENSION, UNCONTROLLED: ICD-10-CM

## 2021-05-28 RX ORDER — DILTIAZEM HYDROCHLORIDE 180 MG/1
180 CAPSULE, COATED, EXTENDED RELEASE ORAL DAILY
Qty: 90 CAPSULE | Refills: 0 | Status: SHIPPED | OUTPATIENT
Start: 2021-05-28 | End: 2021-07-19 | Stop reason: SDUPTHER

## 2021-05-28 RX ORDER — GLIMEPIRIDE 2 MG/1
2 TABLET ORAL EVERY MORNING
Qty: 30 TABLET | Refills: 1 | Status: SHIPPED | OUTPATIENT
Start: 2021-05-28 | End: 2021-07-19 | Stop reason: SDUPTHER

## 2021-05-28 RX ORDER — LANCETS 33 GAUGE
EACH MISCELLANEOUS
Qty: 100 EACH | Refills: 3 | Status: SHIPPED | OUTPATIENT
Start: 2021-05-28

## 2021-06-09 ENCOUNTER — OFFICE VISIT (OUTPATIENT)
Dept: INTERNAL MEDICINE CLINIC | Age: 86
End: 2021-06-09
Payer: MEDICARE

## 2021-06-09 ENCOUNTER — NURSE TRIAGE (OUTPATIENT)
Dept: OTHER | Facility: CLINIC | Age: 86
End: 2021-06-09

## 2021-06-09 ENCOUNTER — HOSPITAL ENCOUNTER (OUTPATIENT)
Dept: VASCULAR LAB | Age: 86
Discharge: HOME | End: 2021-06-09
Payer: MEDICARE

## 2021-06-09 ENCOUNTER — HOSPITAL ENCOUNTER (OUTPATIENT)
Dept: GENERAL RADIOLOGY | Age: 86
Discharge: HOME | End: 2021-06-09
Payer: MEDICARE

## 2021-06-09 VITALS
WEIGHT: 172.4 LBS | HEIGHT: 63 IN | HEART RATE: 86 BPM | DIASTOLIC BLOOD PRESSURE: 78 MMHG | OXYGEN SATURATION: 97 % | BODY MASS INDEX: 30.55 KG/M2 | SYSTOLIC BLOOD PRESSURE: 130 MMHG

## 2021-06-09 DIAGNOSIS — E11.65 TYPE 2 DIABETES MELLITUS WITH HYPERGLYCEMIA, WITHOUT LONG-TERM CURRENT USE OF INSULIN (HCC): ICD-10-CM

## 2021-06-09 DIAGNOSIS — M79.89 SWELLING OF BOTH LOWER EXTREMITIES: Primary | ICD-10-CM

## 2021-06-09 DIAGNOSIS — M79.89 FOOT SWELLING: ICD-10-CM

## 2021-06-09 DIAGNOSIS — I10 ESSENTIAL HYPERTENSION: ICD-10-CM

## 2021-06-09 DIAGNOSIS — M79.89 SWELLING OF BOTH LOWER EXTREMITIES: ICD-10-CM

## 2021-06-09 DIAGNOSIS — L28.2 PRURITIC RASH: ICD-10-CM

## 2021-06-09 DIAGNOSIS — K21.9 GASTROESOPHAGEAL REFLUX DISEASE WITHOUT ESOPHAGITIS: ICD-10-CM

## 2021-06-09 DIAGNOSIS — E03.9 ACQUIRED HYPOTHYROIDISM: ICD-10-CM

## 2021-06-09 DIAGNOSIS — E78.49 OTHER HYPERLIPIDEMIA: ICD-10-CM

## 2021-06-09 DIAGNOSIS — J45.40 MODERATE PERSISTENT ASTHMA WITHOUT COMPLICATION: ICD-10-CM

## 2021-06-09 LAB
A/G RATIO: 1.3 (ref 1.1–2.2)
ALBUMIN SERPL-MCNC: 3.9 G/DL (ref 3.4–5)
ALP BLD-CCNC: 74 U/L (ref 40–129)
ALT SERPL-CCNC: 12 U/L (ref 10–40)
ANION GAP SERPL CALCULATED.3IONS-SCNC: 12 MMOL/L (ref 3–16)
AST SERPL-CCNC: 25 U/L (ref 15–37)
BASOPHILS ABSOLUTE: 0 K/UL (ref 0–0.2)
BASOPHILS RELATIVE PERCENT: 0.7 %
BILIRUB SERPL-MCNC: <0.2 MG/DL (ref 0–1)
BUN BLDV-MCNC: 25 MG/DL (ref 7–20)
CALCIUM SERPL-MCNC: 9.5 MG/DL (ref 8.3–10.6)
CHLORIDE BLD-SCNC: 109 MMOL/L (ref 99–110)
CHP ED QC CHECK: NORMAL
CO2: 24 MMOL/L (ref 21–32)
CREAT SERPL-MCNC: 1.2 MG/DL (ref 0.6–1.2)
EOSINOPHILS ABSOLUTE: 0.1 K/UL (ref 0–0.6)
EOSINOPHILS RELATIVE PERCENT: 1 %
GFR AFRICAN AMERICAN: 51
GFR NON-AFRICAN AMERICAN: 42
GLOBULIN: 2.9 G/DL
GLUCOSE BLD-MCNC: 79 MG/DL (ref 70–99)
GLUCOSE BLD-MCNC: 97 MG/DL
HCT VFR BLD CALC: 31.7 % (ref 36–48)
HEMOGLOBIN: 10.3 G/DL (ref 12–16)
LYMPHOCYTES ABSOLUTE: 1.5 K/UL (ref 1–5.1)
LYMPHOCYTES RELATIVE PERCENT: 22.6 %
MCH RBC QN AUTO: 27.2 PG (ref 26–34)
MCHC RBC AUTO-ENTMCNC: 32.5 G/DL (ref 31–36)
MCV RBC AUTO: 83.6 FL (ref 80–100)
MONOCYTES ABSOLUTE: 0.5 K/UL (ref 0–1.3)
MONOCYTES RELATIVE PERCENT: 7 %
NEUTROPHILS ABSOLUTE: 4.6 K/UL (ref 1.7–7.7)
NEUTROPHILS RELATIVE PERCENT: 68.7 %
PDW BLD-RTO: 17.4 % (ref 12.4–15.4)
PLATELET # BLD: 227 K/UL (ref 135–450)
PMV BLD AUTO: 8.6 FL (ref 5–10.5)
POTASSIUM SERPL-SCNC: 4.7 MMOL/L (ref 3.5–5.1)
RBC # BLD: 3.8 M/UL (ref 4–5.2)
SODIUM BLD-SCNC: 145 MMOL/L (ref 136–145)
TOTAL PROTEIN: 6.8 G/DL (ref 6.4–8.2)
TSH REFLEX: 1.07 UIU/ML (ref 0.27–4.2)
URIC ACID, SERUM: 6.2 MG/DL (ref 2.6–6)
WBC # BLD: 6.7 K/UL (ref 4–11)

## 2021-06-09 PROCEDURE — G8427 DOCREV CUR MEDS BY ELIG CLIN: HCPCS | Performed by: INTERNAL MEDICINE

## 2021-06-09 PROCEDURE — 1123F ACP DISCUSS/DSCN MKR DOCD: CPT | Performed by: INTERNAL MEDICINE

## 2021-06-09 PROCEDURE — G8417 CALC BMI ABV UP PARAM F/U: HCPCS | Performed by: INTERNAL MEDICINE

## 2021-06-09 PROCEDURE — 1090F PRES/ABSN URINE INCON ASSESS: CPT | Performed by: INTERNAL MEDICINE

## 2021-06-09 PROCEDURE — 73630 X-RAY EXAM OF FOOT: CPT

## 2021-06-09 PROCEDURE — 93971 EXTREMITY STUDY: CPT

## 2021-06-09 PROCEDURE — 99214 OFFICE O/P EST MOD 30 MIN: CPT | Performed by: INTERNAL MEDICINE

## 2021-06-09 PROCEDURE — 1036F TOBACCO NON-USER: CPT | Performed by: INTERNAL MEDICINE

## 2021-06-09 PROCEDURE — 82962 GLUCOSE BLOOD TEST: CPT | Performed by: INTERNAL MEDICINE

## 2021-06-09 PROCEDURE — 4040F PNEUMOC VAC/ADMIN/RCVD: CPT | Performed by: INTERNAL MEDICINE

## 2021-06-09 PROCEDURE — 3052F HG A1C>EQUAL 8.0%<EQUAL 9.0%: CPT | Performed by: INTERNAL MEDICINE

## 2021-06-09 RX ORDER — ATORVASTATIN CALCIUM 20 MG/1
20 TABLET, FILM COATED ORAL NIGHTLY
Qty: 90 TABLET | Refills: 0 | Status: SHIPPED | OUTPATIENT
Start: 2021-06-09 | End: 2021-11-23

## 2021-06-09 RX ORDER — LIOTHYRONINE SODIUM 25 UG/1
25 TABLET ORAL DAILY
Qty: 90 TABLET | Refills: 0 | Status: SHIPPED | OUTPATIENT
Start: 2021-06-09

## 2021-06-09 RX ORDER — CLOBETASOL PROPIONATE 0.5 MG/G
OINTMENT TOPICAL
Qty: 60 G | Refills: 0 | Status: SHIPPED | OUTPATIENT
Start: 2021-06-09

## 2021-06-09 RX ORDER — FUROSEMIDE 20 MG/1
20 TABLET ORAL DAILY PRN
Qty: 30 TABLET | Refills: 0 | Status: SHIPPED | OUTPATIENT
Start: 2021-06-09 | End: 2021-07-07

## 2021-06-09 RX ORDER — CLOBETASOL PROPIONATE 0.5 MG/G
OINTMENT TOPICAL
Qty: 60 G | Refills: 0 | Status: CANCELLED | OUTPATIENT
Start: 2021-06-09

## 2021-06-09 SDOH — ECONOMIC STABILITY: FOOD INSECURITY: WITHIN THE PAST 12 MONTHS, THE FOOD YOU BOUGHT JUST DIDN'T LAST AND YOU DIDN'T HAVE MONEY TO GET MORE.: NEVER TRUE

## 2021-06-09 SDOH — ECONOMIC STABILITY: FOOD INSECURITY: WITHIN THE PAST 12 MONTHS, YOU WORRIED THAT YOUR FOOD WOULD RUN OUT BEFORE YOU GOT MONEY TO BUY MORE.: NEVER TRUE

## 2021-06-09 ASSESSMENT — SOCIAL DETERMINANTS OF HEALTH (SDOH): HOW HARD IS IT FOR YOU TO PAY FOR THE VERY BASICS LIKE FOOD, HOUSING, MEDICAL CARE, AND HEATING?: NOT HARD AT ALL

## 2021-06-09 NOTE — TELEPHONE ENCOUNTER
Reason for Disposition   Thigh, calf, or ankle swelling in both legs, but one side is definitely more swollen (Exception: longstanding difference between legs)    Answer Assessment - Initial Assessment Questions  1. ONSET: \"When did the swelling start? \" (e.g., minutes, hours, days)      About 2 weeks or so     2. LOCATION: \"What part of the leg is swollen? \"  \"Are both legs swollen or just one leg? \"        BLLE up into calf area on L and up to knee in the R      3. SEVERITY: \"How bad is the swelling? \" (e.g., localized; mild, moderate, severe)   - Localized - small area of swelling localized to one leg   - MILD pedal edema - swelling limited to foot and ankle, pitting edema < 1/4 inch (6 mm) deep, rest and elevation eliminate most or all swelling   - MODERATE edema - swelling of lower leg to knee, pitting edema > 1/4 inch (6 mm) deep, rest and elevation only partially reduce swelling   - SEVERE edema - swelling extends above knee, facial or hand swelling present                 Moderate and denies pitting     4. REDNESS: \"Does the swelling look red or infected? \"         Feet and heals areas are red         Denies warmth     5. PAIN: \"Is the swelling painful to touch? \" If so, ask: \"How painful is it? \"   (Scale 1-10; mild, moderate or severe)          Denies pain with swelling but does have chronic pain that she is seen for but nothing new or worsening. 6. FEVER: \"Do you have a fever? \" If so, ask: \"What is it, how was it measured, and when did it start? \"           Denies     7. CAUSE: \"What do you think is causing the leg swelling? \"           Unsure- pt has never had swelling in feet before     8. MEDICAL HISTORY: \"Do you have a history of heart failure, kidney disease, liver failure, or cancer? \"            Denies     9. RECURRENT SYMPTOM: \"Have you had leg swelling before? \" If so, ask: \"When was the last time? \" \"What happened that time? \"            Pt has never this before     10.  OTHER SYMPTOMS: \"Do you have any other symptoms? \" (e.g., chest pain, difficulty breathing)             Intermittent abd pain across upper area and feels like \"pins\" and intermittent pain in upper arms that is the same. \"leon like a tingling\" and pt denies both currently , pt has hemorrhoids that are bothering her     11. PREGNANCY: \"Is there any chance you are pregnant? \" \"When was your last menstrual period? \"            NA    Protocols used: LEG SWELLING AND EDEMA-ADULT-OH    [facility] Dr [facility] [address] with Red Flag Complaint. Brief description of triage: see above     Triage indicates for patient to go to [address] [place] [address] for BLLE swelling with R being worse than L. Writer called office and no providers available at this time. Writer called Jay Lopez NP with a recommendation of having pt seen today or tomorrow in office. Pt advised, verbalized understanding and was agreeable to plan. Care advice provided, patient verbalizes understanding; denies any other questions or concerns; instructed to call back for any new or worsening symptoms. Writer provided warm transfer to [facility]  at [facility] for appointment scheduling. Attention Provider: Thank you for allowing me to participate in the care of your patient. The patient was connected to triage in response to information provided to the ECC. Please do not respond through this encounter as the response is not directed to a shared pool.

## 2021-06-09 NOTE — PROGRESS NOTES
SUBJECTIVE:  Nasrin Cannon is a 80 y.o. female HERE FOR   Chief Complaint   Patient presents with    Diabetes    Other     SWEELING IN BOTH LEGS. PT NEEDS BP CUFF FOR HOME       PT HERE FOR EVAL  PT HERE WITH GRANDDAUGHTER    C/O FOOT/ LEG SWELLING -  PAST 2-3 WEEKS. RT > LT. + PAIN / DISCOMFORT RT, NO RECENT TRAVELS. ? NO REDNESS, NO WARMTH. DENIES TRAUMA  DM-  TAKING MEDS. HBS - [de-identified] LOW  90'S. + EXERCISE / DIET COMPLIANCE . LAST EYE EXAM  ? 2021  HTN - TAKING MED.  BP NOTED. + DIET / EXERCISE COMPLIANCE. NO HEADACHE, NO DIZZINESS  HLP - TAKING LIPITOR . + DIET / EXERCISE COMPLIANCE. NO  MUSCLE CRAMPS / NO MUSCLE ACHES  ASTHMA - NO WHEEZING, NO SOB, NO INCREASED INHALER USE. HYPOTHYROIDISM - TAKING CYTOMEL.  OCC FATIGUE. NO COLD INTOLERANCE  GERD - TAKING MED OCCASIONALLY. DENIES HEARTBURN. RASH - IMPROVED. REQUESTING MED REFILL    DENIES CP, No SOB, No PALPITATIONS, No COUGH, NO F/C  No ABD PAIN, No N/V, OCC DIARRHEA,  CONSTIPATION - IMPROVED, No MELENA, No HEMATOCHEZIA. No DYSURIA, No FREQ, No URGENCY, No HEMATURIA          PMH: REVIEWED AND UPDATED TODAY    PSH: REVIEWED AND UPDATED TODAY    SOCIAL HX: REVIEWED AND UPDATED TODAY    FAMILY HX: REVIEWED AND UPDATED TODAY    ALLERGY:  Amlodipine, Breo ellipta [fluticasone furoate-vilanterol], Lisinopril, Pcn [penicillins], and Synthroid [levothyroxine]    MEDS: REVIEWED  Prior to Visit Medications    Medication Sig Taking?  Authorizing Provider   glimepiride (AMARYL) 2 MG tablet TAKE 1 TABLET BY MOUTH EVERY MORNING Yes Rad Hinds MD   Lancets ([facility]) [address] Yes Rad Hinds MD   dilTIAZem (CARDIZEM CD) 180 MG extended release capsule TAKE 1 CAPSULE BY MOUTH DAILY Yes Rad Hinds MD   SITagliptin-metFORMIN (JANUMET XR)  MG TB24 per extended release tablet Take 1 tablet by mouth daily Yes Rad Hinds MD   omeprazole (PRILOSEC) 40 MG delayed release capsule Take 1 capsule by mouth daily Yes Herberth Gonzalez MD   clobetasol (TEMOVATE) 0.05 % ointment Apply topically 2 times daily / PRN. Yes Herberth Gonzalez MD   blood glucose test strips (ONE TOUCH ULTRA TEST) strip 1 each by In Vitro route 2 times daily As needed. Yes Herberth Gonzalez MD   liothyronine (CYTOMEL) 25 MCG tablet Take 1 tablet by mouth daily Yes Herberth Gonzalez MD   atorvastatin (LIPITOR) 20 MG tablet Take 1 tablet by mouth nightly Yes Herberth Gonzalez MD   BREO ELLIPTA 200-25 MCG/INH AEPB inhaler INHALE 1 PUFF INTO THE LUNGS DAILY Yes Bhavani Vargas MD   Blood Pressure Monitoring (BLOOD PRESS MONITOR/M-L CUFF) MISC 1 Device by Does not apply route daily Yes Herberth Gonzalez MD   albuterol sulfate HFA (PROVENTIL HFA) 108 (90 Base) MCG/ACT inhaler Inhale 2 puffs into the lungs every 4 hours as needed for Wheezing or Shortness of Breath Yes Bhavani Vargas MD   fluticasone (FLONASE) 50 MCG/ACT nasal spray SHAKE LIQUID AND USE 2 SPRAYS IN EACH NOSTRIL DAILY AS NEEDED FOR RHINITIS OR ALLERGIES Yes Herberth Gonzalez MD   meloxicam (MOBIC) 7.5 MG tablet Take 7.5 mg by mouth daily as needed for Pain Yes Historical Provider, MD   diclofenac sodium 1 % GEL Apply 4 g topically 4 times daily as needed for Pain Yes Herberth Gonzalez MD   Blood Glucose Monitoring Suppl (ONE TOUCH ULTRA 2) w/Device KIT 1 kit by Does not apply route 2 times daily Yes Herberth Gonzalez MD   cetirizine (ZYRTEC) 10 MG tablet Take 10 mg by mouth daily Yes Historical Provider, MD   Cyanocobalamin (B-12) 2500 MCG SUBL Place 2,500 mg under the tongue daily Yes Historical Provider, MD   aspirin 81 MG tablet Take 81 mg by mouth daily Yes Historical Provider, MD   montelukast (SINGULAIR) 10 MG tablet TAKE 1 TABLET BY MOUTH DAILY FOR ALLERGIES Yes Spring Bar MD   Lift Chair MISC by Does not apply route Yes Spring Bar MD   Iron-Vitamins (GERITOL PO) Take 1 tablet by mouth daily.  Yes Historical Provider, MD   multivitamin (ANTIOXIDANT;PROSIGHT) TABS per tablet Take 1 tablet by mouth 5. Other hyperlipidemia  COUNSELLED. CONTINUE LIPITOR. ADVISED LOW FAT / CHOL DIET/ EXERCISE.  MONITOR LFT. GOALS D/W PT.  MAKE CHANGES AS NEEDED. 6. Moderate persistent asthma without complication  COUNSELLED. CONTINUE INHALERS. MONITOR  MAKE CHANGES AS NEEDED. 7. Acquired hypothyroidism  COUNSELLED. MONITOR ON CYTOMEL. TSH TO EVAL  MAKE CHANGES AS NEEDED       8. Gastroesophageal reflux disease without esophagitis  COUNSELLED. CONTINUE MGT. ANTIREFLUX PRECAUTIONS ADVISED. MONITOR. MAKE CHANGES AS NEEDED. 9. Pruritic rash  COUNSELLED. IMPROVING. clobetasol (TEMOVATE) 0.05 % ointment PRN  MAKE CHANGES AS NEEDED.                          MEDICATION SIDE EFFECTS D/W PATIENT    RETURN TO CLINIC PREVIOUS / PRN    FOLLOW UP FOR  LABS, DOPPLER, X RAY

## 2021-07-05 DIAGNOSIS — M79.89 SWELLING OF BOTH LOWER EXTREMITIES: ICD-10-CM

## 2021-07-07 ENCOUNTER — TELEPHONE (OUTPATIENT)
Dept: INTERNAL MEDICINE CLINIC | Age: 86
End: 2021-07-07

## 2021-07-07 RX ORDER — FUROSEMIDE 20 MG/1
TABLET ORAL
Qty: 30 TABLET | Refills: 0 | Status: SHIPPED | OUTPATIENT
Start: 2021-07-07 | End: 2021-07-19 | Stop reason: ALTCHOICE

## 2021-07-12 ENCOUNTER — TELEPHONE (OUTPATIENT)
Dept: INTERNAL MEDICINE CLINIC | Age: 86
End: 2021-07-12

## 2021-07-12 DIAGNOSIS — I82.401 ACUTE DEEP VEIN THROMBOSIS (DVT) OF RIGHT LOWER EXTREMITY, UNSPECIFIED VEIN (HCC): Primary | ICD-10-CM

## 2021-07-12 NOTE — TELEPHONE ENCOUNTER
Patient is requesting refill on medication xarelto 20mg bid. Patient took her last dosage and package advised for her to contact her physician.

## 2021-07-19 ENCOUNTER — OFFICE VISIT (OUTPATIENT)
Dept: INTERNAL MEDICINE CLINIC | Age: 86
End: 2021-07-19
Payer: MEDICARE

## 2021-07-19 VITALS
SYSTOLIC BLOOD PRESSURE: 120 MMHG | BODY MASS INDEX: 28.49 KG/M2 | WEIGHT: 160.8 LBS | OXYGEN SATURATION: 95 % | DIASTOLIC BLOOD PRESSURE: 68 MMHG | HEART RATE: 92 BPM | HEIGHT: 63 IN

## 2021-07-19 DIAGNOSIS — I10 ESSENTIAL HYPERTENSION: ICD-10-CM

## 2021-07-19 DIAGNOSIS — E11.65 TYPE 2 DIABETES MELLITUS WITH HYPERGLYCEMIA, WITHOUT LONG-TERM CURRENT USE OF INSULIN (HCC): ICD-10-CM

## 2021-07-19 DIAGNOSIS — I82.401 ACUTE DEEP VEIN THROMBOSIS (DVT) OF RIGHT LOWER EXTREMITY, UNSPECIFIED VEIN (HCC): Primary | ICD-10-CM

## 2021-07-19 DIAGNOSIS — E03.9 ACQUIRED HYPOTHYROIDISM: ICD-10-CM

## 2021-07-19 DIAGNOSIS — K21.9 GASTROESOPHAGEAL REFLUX DISEASE WITHOUT ESOPHAGITIS: ICD-10-CM

## 2021-07-19 DIAGNOSIS — E78.49 OTHER HYPERLIPIDEMIA: ICD-10-CM

## 2021-07-19 DIAGNOSIS — J45.40 MODERATE PERSISTENT ASTHMA WITHOUT COMPLICATION: ICD-10-CM

## 2021-07-19 LAB
CHP ED QC CHECK: NORMAL
GLUCOSE BLD-MCNC: 188 MG/DL
HBA1C MFR BLD: 6 %

## 2021-07-19 PROCEDURE — 83036 HEMOGLOBIN GLYCOSYLATED A1C: CPT | Performed by: INTERNAL MEDICINE

## 2021-07-19 PROCEDURE — 1123F ACP DISCUSS/DSCN MKR DOCD: CPT | Performed by: INTERNAL MEDICINE

## 2021-07-19 PROCEDURE — G8427 DOCREV CUR MEDS BY ELIG CLIN: HCPCS | Performed by: INTERNAL MEDICINE

## 2021-07-19 PROCEDURE — G8417 CALC BMI ABV UP PARAM F/U: HCPCS | Performed by: INTERNAL MEDICINE

## 2021-07-19 PROCEDURE — 1090F PRES/ABSN URINE INCON ASSESS: CPT | Performed by: INTERNAL MEDICINE

## 2021-07-19 PROCEDURE — 3288F FALL RISK ASSESSMENT DOCD: CPT | Performed by: INTERNAL MEDICINE

## 2021-07-19 PROCEDURE — 99214 OFFICE O/P EST MOD 30 MIN: CPT | Performed by: INTERNAL MEDICINE

## 2021-07-19 PROCEDURE — 82962 GLUCOSE BLOOD TEST: CPT | Performed by: INTERNAL MEDICINE

## 2021-07-19 PROCEDURE — 1036F TOBACCO NON-USER: CPT | Performed by: INTERNAL MEDICINE

## 2021-07-19 PROCEDURE — 4040F PNEUMOC VAC/ADMIN/RCVD: CPT | Performed by: INTERNAL MEDICINE

## 2021-07-19 RX ORDER — DILTIAZEM HYDROCHLORIDE 180 MG/1
180 CAPSULE, COATED, EXTENDED RELEASE ORAL DAILY
Qty: 90 CAPSULE | Refills: 0 | Status: SHIPPED | OUTPATIENT
Start: 2021-07-19 | End: 2021-11-23

## 2021-07-19 RX ORDER — OMEPRAZOLE 40 MG/1
40 CAPSULE, DELAYED RELEASE ORAL DAILY
Qty: 90 CAPSULE | Refills: 0 | Status: SHIPPED | OUTPATIENT
Start: 2021-07-19

## 2021-07-19 RX ORDER — SITAGLIPTIN AND METFORMIN HYDROCHLORIDE 1000; 50 MG/1; MG/1
1 TABLET, FILM COATED, EXTENDED RELEASE ORAL DAILY
Qty: 90 TABLET | Refills: 0 | Status: SHIPPED | OUTPATIENT
Start: 2021-07-19

## 2021-07-19 RX ORDER — GLIMEPIRIDE 2 MG/1
2 TABLET ORAL EVERY MORNING
Qty: 30 TABLET | Refills: 1 | Status: SHIPPED | OUTPATIENT
Start: 2021-07-19

## 2021-07-19 SDOH — ECONOMIC STABILITY: FOOD INSECURITY: WITHIN THE PAST 12 MONTHS, YOU WORRIED THAT YOUR FOOD WOULD RUN OUT BEFORE YOU GOT MONEY TO BUY MORE.: NEVER TRUE

## 2021-07-19 SDOH — ECONOMIC STABILITY: FOOD INSECURITY: WITHIN THE PAST 12 MONTHS, THE FOOD YOU BOUGHT JUST DIDN'T LAST AND YOU DIDN'T HAVE MONEY TO GET MORE.: NEVER TRUE

## 2021-07-19 ASSESSMENT — PATIENT HEALTH QUESTIONNAIRE - PHQ9
SUM OF ALL RESPONSES TO PHQ9 QUESTIONS 1 & 2: 0
2. FEELING DOWN, DEPRESSED OR HOPELESS: 0
1. LITTLE INTEREST OR PLEASURE IN DOING THINGS: 0
SUM OF ALL RESPONSES TO PHQ QUESTIONS 1-9: 0

## 2021-07-19 ASSESSMENT — SOCIAL DETERMINANTS OF HEALTH (SDOH): HOW HARD IS IT FOR YOU TO PAY FOR THE VERY BASICS LIKE FOOD, HOUSING, MEDICAL CARE, AND HEATING?: NOT HARD AT ALL

## 2021-07-19 NOTE — PROGRESS NOTES
SUBJECTIVE:  John Speaker is a 80 y.o. female [address] [place]   Chief Complaint   Patient presents with    Diabetes    Hypertension    Other        PT HERE FOR EVAL  PT HERE WITH GRANDDAUGHTER     DVT-  RLE -   PAIN AND SWELLING  IMPROVED. ON XARELTO. ? NO BRUISING  DM-  TAKING MEDS. HBS - FLUCTUATES. + EXERCISE / DIET COMPLIANCE . LAST EYE EXAM  ? 2021  HTN - TAKING MED.   + DIET / EXERCISE COMPLIANCE. NO HEADACHE, NO DIZZINESS  HLP - TAKING LIPITOR . + DIET / EXERCISE COMPLIANCE. NO  MUSCLE CRAMPS / NO MUSCLE ACHES  HYPOTHYROIDISM - TAKING CYTOMEL.  OCC FATIGUE. NO COLD INTOLERANCE  ASTHMA - NO WHEEZING, NO SOB, NO INCREASED INHALER USE. GERD - TAKING MED OCCASIONALLY. DENIES HEARTBURN.       DENIES CP, No SOB, No PALPITATIONS, [facility] [address] [place] [address], NO F/C  No ABD PAIN, No N/V, OCC DIARRHEA,  CONSTIPATION - IMPROVED, No MELENA, No HEMATOCHEZIA. No DYSURIA, No FREQ, No URGENCY, No HEMATURIA          PMH: REVIEWED AND UPDATED TODAY    PSH: REVIEWED AND UPDATED TODAY    SOCIAL HX: REVIEWED AND UPDATED TODAY    FAMILY HX: REVIEWED AND UPDATED TODAY    ALLERGY:  Amlodipine, Breo ellipta [fluticasone furoate-vilanterol], Lisinopril, Pcn [penicillins], and Synthroid [levothyroxine]    MEDS: REVIEWED  Prior to Visit Medications    Medication Sig Taking? Authorizing Provider   rivaroxaban (XARELTO) 20 MG TABS tablet Take 1 tablet by mouth daily Yes Laureano Hughes MD   furosemide (LASIX) 20 MG tablet TAKE 1 TABLET BY MOUTH DAILY AS NEEDED Yes Laureano Hughes MD   liothyronine (CYTOMEL) 25 MCG tablet Take 1 tablet by mouth daily Yes Laureano Hughes MD   atorvastatin (LIPITOR) 20 MG tablet Take 1 tablet by mouth nightly Yes Laureano Hughes MD   clobetasol (TEMOVATE) 0.05 % ointment Apply topically 2 times daily / PRN. Yes Laureano Hughes MD   rivaroxaban 15 & 20 MG Starter Pack Take as directed on package.  Yes Laureano Hughes MD   glimepiride (AMARYL) 2 MG tablet TAKE 1 TABLET BY MOUTH EVERY MORNING Yes Laureano Hughes MD Lancets ([id number]) [facility] USE TWICE DAILY Yes Elvia Bermudez MD   dilTIAZem (CARDIZEM CD) 180 MG extended release capsule TAKE 1 CAPSULE BY MOUTH DAILY Yes Elvia Bermudez MD   SITagliptin-metFORMIN (JANUMET XR)  MG TB24 per extended release tablet Take 1 tablet by mouth daily Yes Elvia Bermudez MD   omeprazole (PRILOSEC) 40 MG delayed release capsule Take 1 capsule by mouth daily Yes Elvia Bermudez MD   blood glucose test strips (ONE TOUCH ULTRA TEST) strip 1 each by In Vitro route 2 times daily As needed.  Yes Elvia Bermudez MD   BREO ELLIPTA 200-25 MCG/INH AEPB inhaler INHALE 1 PUFF INTO THE LUNGS DAILY Yes Wendie Guerrier MD   Blood Pressure Monitoring (BLOOD PRESS MONITOR/M-L CUFF) MISC 1 Device by Does not apply route daily Yes Elvia Bermudez MD   albuterol sulfate HFA (PROVENTIL HFA) 108 (90 Base) MCG/ACT inhaler Inhale 2 puffs into the lungs every 4 hours as needed for Wheezing or Shortness of Breath Yes Wendie Guerrier MD   fluticasone (FLONASE) 50 MCG/ACT nasal spray SHAKE LIQUID AND USE 2 SPRAYS IN EACH NOSTRIL DAILY AS NEEDED FOR RHINITIS OR ALLERGIES Yes Elvia Bermudez MD   meloxicam (MOBIC) 7.5 MG tablet Take 7.5 mg by mouth daily as needed for Pain Yes Historical Provider, MD   diclofenac sodium 1 % GEL Apply 4 g topically 4 times daily as needed for Pain Yes Elvia Bermudez MD   Blood Glucose Monitoring Suppl (ONE TOUCH ULTRA 2) w/Device KIT 1 kit by Does not apply route 2 times daily Yes Elvia Bermudez MD   cetirizine (ZYRTEC) 10 MG tablet Take 10 mg by mouth daily Yes Historical Provider, MD   Cyanocobalamin (B-12) 2500 MCG SUBL Place 2,500 mg under the tongue daily Yes Historical Provider, MD   aspirin 81 MG tablet Take 81 mg by mouth daily Yes Historical Provider, MD   montelukast (SINGULAIR) 10 MG tablet TAKE 1 TABLET BY MOUTH DAILY FOR ALLERGIES Yes Stefan Chan MD   Lift Chair MISC by Does not apply route Yes Stefan Chan MD   Iron-Vitamins (Gelene Flirt PO) Take 1 tablet by mouth daily. Yes Historical Provider, MD   multivitamin (ANTIOXIDANT;PROSIGHT) TABS per tablet Take 1 tablet by mouth daily. Yes Historical Provider, MD       ROS: COMPREHENSIVE ROS AS IN HX, REST -VE  History obtained from chart review and the patient      OBJECTIVE:   NURSING NOTE AND VITALS REVIEWED  /60 (Site: Left Upper Arm, Position: Sitting, Cuff Size: Large Adult)   Pulse 92   Ht 5' 3\" (1.6 m)   Wt 160 lb 12.8 oz (72.9 kg)   SpO2 95%   Breastfeeding No   BMI 28.48 kg/m²     NO ACUTE DISTRESS    REPEAT BP: 120/68 (LT), NO ORTHOSTASIS     Body mass index is 28.48 kg/m². HEENT: NO PALLOR, ANICTERIC, PERRLA, EOMI, NO CONJUNCTIVAL ERYTHEMA,                 NO SINUS TENDERNESS  NECK:  SUPPLE, TRACHEA MIDLINE, NT, NO JVD, NO CB, NO LA, NO TM, NO STIFFNESS  CHEST: RESPY EFFORT NL, GOOD AE, NO W/R/C  HEART: S1S2+ REG, NO M/G/R  ABD: SOFT, NT, NO HSM, BS+  EXT: NO EDEMA, NT, PULSES +. DUNIA'S -VE  NEURO: ALERT AND ORIENTED X 3, NO MENINGEAL SIGNS, NO TREMORS, AMBULATING WITH A WALKER - + LIMP OTHERWISE, NO FOCAL DEFICITS  PSYCH: FAIRLY GOOD AFFECT  BACK: NT, NO ROM, NO CVA TENDERNESS    PREVIOUS LABS / DOPPLER  / X RAY REVIEWED AND D/W PT    ACCUCHECK: 188 (POSTPRANDIAL)    POC HBA1C: 6.0    ASSESSMENT / PLAN:     Diagnosis Orders   1. Acute deep vein thrombosis (DVT) of right lower extremity, unspecified vein (HCC)  COUNSELLED. Sid Bread. MONITOR. MAKE CHANGES AS NEEDED. 2. Type 2 diabetes mellitus with hyperglycemia, without long-term current use of insulin ([address]  COUNSELLED. IMPROVED CONTROL  CONTINUE MEDS  MED COMPLIANCE ADVISED  DIET/ EXERCISE ADVISED. MONITOR. GOAL FBS 80- 120, HBA1C < 7  ADVISED ON HOME BLOOD SUGAR MONITORING  F/U DM EYE EXAM.  MAKE CHANGES AS NEEDED. 3. Essential hypertension  COUNSELLED. CONTINUE MED. LOW NA+ / DASH DIET/ EXERCISE. MONITOR. GOAL </= 120/80  MONITOR FOR HYPOTENSION  MAKE CHANGES AS NEEDED.        4. Other hyperlipidemia  COUNSELLED. ADVISED LOW FAT / CHOL DIET/ EXERCISE.  MONITOR ON MED.  GOALS D/W PT.  MAKE CHANGES AS NEEDED. 5. Acquired hypothyroidism  COUNSELLED. MONITOR ON SYNTHROID.  MAKE CHANGES AS NEEDED       6. Moderate persistent asthma without complication  COUNSELLED. CONTINUE BREO. ALBUTEROL PRN. MONITOR. MONITOR FOR TRIGGERS- ENVIRONMENTAL MODIFICATION. MAKE CHANGES AS NEEDED. 7. Gastroesophageal reflux disease without esophagitis  COUNSELLED. CONTINUE MGT. ANTIREFLUX PRECAUTIONS ADVISED. MONITOR. MAKE CHANGES AS NEEDED.                          MEDICATION SIDE EFFECTS D/W PATIENT    PT STABLE AT TIME OF D/C.    RETURN TO CLINIC WITHIN 2-3 MONTHS / PRN

## 2021-07-21 ENCOUNTER — TELEPHONE (OUTPATIENT)
Dept: INTERNAL MEDICINE CLINIC | Age: 86
End: 2021-07-21

## 2021-07-21 NOTE — TELEPHONE ENCOUNTER
Patient daughter is requesting a list of medication and wants to know if she should be taking gliperide and janumet together because dermatologist is requesting her to stop taking gliperide because medication she is being given at dermatologist does not agree with gliperide please advise. Also wants to know if she should be taking lasix along with diltagliptin please advise. How long should she be on blood thinners? And how long should she take levothyroxin?

## 2021-07-21 NOTE — TELEPHONE ENCOUNTER
Pt daughter is requesting to speak to you about some med's pt derm is stating that med's should not be mixed please advise

## 2021-07-21 NOTE — TELEPHONE ENCOUNTER
----- Message from Dimitri Morris sent at 7/19/2021  4:28 PM EDT -----  Subject: Message to Provider    QUESTIONS  Information for Provider? Daughter needs a medication list including start   dates emailed to her to provide to the specialist she will be seeing. Email to [email or web address]  ---------------------------------------------------------------------------  --------------  CALL BACK INFO  What is the best way for the office to contact you? OK to leave message on   voicemail  Preferred Call Back Phone Number? [phone]  ---------------------------------------------------------------------------  --------------  SCRIPT ANSWERS  Relationship to Patient? Other  Representative Name? keisha Gautam  Is the Representative on the appropriate HIPAA document in Epic?  Yes

## 2021-08-31 ENCOUNTER — HOSPITAL ENCOUNTER (EMERGENCY)
Age: 86
Discharge: HOME | End: 2021-09-01
Attending: EMERGENCY MEDICINE
Payer: MEDICARE

## 2021-08-31 DIAGNOSIS — E86.0 DEHYDRATION: Primary | ICD-10-CM

## 2021-08-31 DIAGNOSIS — E83.42 HYPOMAGNESEMIA: ICD-10-CM

## 2021-08-31 DIAGNOSIS — E87.6 HYPOKALEMIA: ICD-10-CM

## 2021-08-31 PROCEDURE — 96365 THER/PROPH/DIAG IV INF INIT: CPT

## 2021-08-31 PROCEDURE — 99285 EMERGENCY DEPT VISIT HI MDM: CPT

## 2021-08-31 RX ORDER — SODIUM CHLORIDE, SODIUM LACTATE, POTASSIUM CHLORIDE, AND CALCIUM CHLORIDE .6; .31; .03; .02 G/100ML; G/100ML; G/100ML; G/100ML
500 INJECTION, SOLUTION INTRAVENOUS ONCE
Status: COMPLETED | OUTPATIENT
Start: 2021-09-01 | End: 2021-09-01

## 2021-09-01 ENCOUNTER — APPOINTMENT (OUTPATIENT)
Dept: GENERAL RADIOLOGY | Age: 86
End: 2021-09-01
Payer: MEDICARE

## 2021-09-01 VITALS
BODY MASS INDEX: 30.11 KG/M2 | HEART RATE: 80 BPM | SYSTOLIC BLOOD PRESSURE: 140 MMHG | DIASTOLIC BLOOD PRESSURE: 71 MMHG | WEIGHT: 170 LBS | OXYGEN SATURATION: 98 % | TEMPERATURE: 98.1 F | RESPIRATION RATE: 17 BRPM

## 2021-09-01 LAB
ALBUMIN SERPL-MCNC: 3.4 G/DL (ref 3.4–5)
ALP BLD-CCNC: 66 U/L (ref 40–129)
ALT SERPL-CCNC: 8 U/L (ref 10–40)
ANION GAP SERPL CALCULATED.3IONS-SCNC: 16 MMOL/L (ref 3–16)
AST SERPL-CCNC: 19 U/L (ref 15–37)
BASOPHILS ABSOLUTE: 0 K/UL (ref 0–0.2)
BASOPHILS RELATIVE PERCENT: 0.4 %
BILIRUB SERPL-MCNC: <0.2 MG/DL (ref 0–1)
BILIRUBIN DIRECT: <0.2 MG/DL (ref 0–0.3)
BILIRUBIN URINE: NEGATIVE
BILIRUBIN, INDIRECT: ABNORMAL MG/DL (ref 0–1)
BLOOD, URINE: NEGATIVE
BUN BLDV-MCNC: 16 MG/DL (ref 7–20)
CALCIUM SERPL-MCNC: 9.2 MG/DL (ref 8.3–10.6)
CHLORIDE BLD-SCNC: 97 MMOL/L (ref 99–110)
CLARITY: CLEAR
CO2: 20 MMOL/L (ref 21–32)
COLOR: YELLOW
CREAT SERPL-MCNC: 1 MG/DL (ref 0.6–1.2)
EKG ATRIAL RATE: 75 BPM
EKG DIAGNOSIS: NORMAL
EKG P AXIS: 30 DEGREES
EKG P-R INTERVAL: 132 MS
EKG Q-T INTERVAL: 430 MS
EKG QRS DURATION: 84 MS
EKG QTC CALCULATION (BAZETT): 480 MS
EKG R AXIS: 31 DEGREES
EKG T AXIS: 96 DEGREES
EKG VENTRICULAR RATE: 75 BPM
EOSINOPHILS ABSOLUTE: 0.2 K/UL (ref 0–0.6)
EOSINOPHILS RELATIVE PERCENT: 2.3 %
GFR AFRICAN AMERICAN: >60
GFR NON-AFRICAN AMERICAN: 52
GLUCOSE BLD-MCNC: 144 MG/DL (ref 70–99)
GLUCOSE URINE: NEGATIVE MG/DL
HCT VFR BLD CALC: 28.9 % (ref 36–48)
HEMOGLOBIN: 9.5 G/DL (ref 12–16)
KETONES, URINE: NEGATIVE MG/DL
LEUKOCYTE ESTERASE, URINE: NEGATIVE
LYMPHOCYTES ABSOLUTE: 1.6 K/UL (ref 1–5.1)
LYMPHOCYTES RELATIVE PERCENT: 20.7 %
MAGNESIUM: 1.5 MG/DL (ref 1.8–2.4)
MCH RBC QN AUTO: 26.1 PG (ref 26–34)
MCHC RBC AUTO-ENTMCNC: 33 G/DL (ref 31–36)
MCV RBC AUTO: 79.3 FL (ref 80–100)
MICROSCOPIC EXAMINATION: NORMAL
MONOCYTES ABSOLUTE: 0.5 K/UL (ref 0–1.3)
MONOCYTES RELATIVE PERCENT: 6.8 %
NEUTROPHILS ABSOLUTE: 5.4 K/UL (ref 1.7–7.7)
NEUTROPHILS RELATIVE PERCENT: 69.8 %
NITRITE, URINE: NEGATIVE
PDW BLD-RTO: 16.4 % (ref 12.4–15.4)
PH UA: 6 (ref 5–8)
PLATELET # BLD: 272 K/UL (ref 135–450)
PMV BLD AUTO: 8 FL (ref 5–10.5)
POTASSIUM SERPL-SCNC: 3.1 MMOL/L (ref 3.5–5.1)
PROTEIN UA: NEGATIVE MG/DL
RBC # BLD: 3.64 M/UL (ref 4–5.2)
SODIUM BLD-SCNC: 133 MMOL/L (ref 136–145)
SPECIFIC GRAVITY UA: 1.01 (ref 1–1.03)
TOTAL PROTEIN: 6.5 G/DL (ref 6.4–8.2)
TROPONIN: <0.01 NG/ML
URINE TYPE: NORMAL
UROBILINOGEN, URINE: 0.2 E.U./DL
WBC # BLD: 7.8 K/UL (ref 4–11)

## 2021-09-01 PROCEDURE — 93005 ELECTROCARDIOGRAM TRACING: CPT | Performed by: EMERGENCY MEDICINE

## 2021-09-01 PROCEDURE — 36415 COLL VENOUS BLD VENIPUNCTURE: CPT

## 2021-09-01 PROCEDURE — 2580000003 HC RX 258: Performed by: EMERGENCY MEDICINE

## 2021-09-01 PROCEDURE — 6370000000 HC RX 637 (ALT 250 FOR IP): Performed by: EMERGENCY MEDICINE

## 2021-09-01 PROCEDURE — 80048 BASIC METABOLIC PNL TOTAL CA: CPT

## 2021-09-01 PROCEDURE — 71045 X-RAY EXAM CHEST 1 VIEW: CPT

## 2021-09-01 PROCEDURE — 81003 URINALYSIS AUTO W/O SCOPE: CPT

## 2021-09-01 PROCEDURE — 80076 HEPATIC FUNCTION PANEL: CPT

## 2021-09-01 PROCEDURE — 84484 ASSAY OF TROPONIN QUANT: CPT

## 2021-09-01 PROCEDURE — 6360000002 HC RX W HCPCS: Performed by: EMERGENCY MEDICINE

## 2021-09-01 PROCEDURE — 96365 THER/PROPH/DIAG IV INF INIT: CPT

## 2021-09-01 PROCEDURE — 83735 ASSAY OF MAGNESIUM: CPT

## 2021-09-01 PROCEDURE — 85025 COMPLETE CBC W/AUTO DIFF WBC: CPT

## 2021-09-01 RX ORDER — MAGNESIUM SULFATE 1 G/100ML
1000 INJECTION INTRAVENOUS ONCE
Status: COMPLETED | OUTPATIENT
Start: 2021-09-01 | End: 2021-09-01

## 2021-09-01 RX ORDER — POTASSIUM CHLORIDE 20 MEQ/1
40 TABLET, EXTENDED RELEASE ORAL ONCE
Status: COMPLETED | OUTPATIENT
Start: 2021-09-01 | End: 2021-09-01

## 2021-09-01 RX ADMIN — SODIUM CHLORIDE, POTASSIUM CHLORIDE, SODIUM LACTATE AND CALCIUM CHLORIDE 500 ML: 600; 310; 30; 20 INJECTION, SOLUTION INTRAVENOUS at 00:34

## 2021-09-01 RX ADMIN — POTASSIUM CHLORIDE 40 MEQ: 1500 TABLET, EXTENDED RELEASE ORAL at 02:00

## 2021-09-01 RX ADMIN — MAGNESIUM SULFATE HEPTAHYDRATE 1000 MG: 1 INJECTION, SOLUTION INTRAVENOUS at 01:55

## 2021-09-01 ASSESSMENT — ENCOUNTER SYMPTOMS
RESPIRATORY NEGATIVE: 1
GASTROINTESTINAL NEGATIVE: 1
EYES NEGATIVE: 1

## 2021-09-01 NOTE — ED NOTES
Discharge instructions provided to patient by RN at bedside. No further concerns addressed at this time.      Darlyn Jones RN  09/01/21 [id number]

## 2021-09-01 NOTE — ED NOTES
Bed: A07-07  Expected date:   Expected time:   Means of arrival:   Comments:  adelaide Zaragoza RN  08/31/21 [id number]

## 2021-09-01 NOTE — ED PROVIDER NOTES
[address] [facility]          ATTENDING PHYSICIAN NOTE       Date of evaluation: 8/31/2021    Chief Complaint     Extremity Weakness (general weakness)      History of Present Illness     Kaitlyn Jaeger is a 80 y.o. female who presents to the emergency department complaining of generalized weakness. Patient states that she was getting ready for bed and just felt very weak all over. She denies any focal weakness. Patient states she is had poor appetite for the last several days and has not been eating and drinking well. She denies any fevers or chills. She denies any chest pain or pressure. She denies any cough or shortness of breath. She denies any nausea, vomiting, or diarrhea. She states she has had constipation but that is chronic for her. She denies any urinary symptoms. 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 Review of Systems     Review of Systems   Constitutional: Positive for appetite change and fatigue. Negative for chills and fever. HENT: Negative. Eyes: Negative. Respiratory: Negative. Cardiovascular: Negative. Gastrointestinal: Negative. Genitourinary: Negative. Musculoskeletal: Negative. Skin: Positive for rash. Neurological: Negative. All other systems reviewed and are negative. Past Medical, Surgical, Family, and Social History     She has a past medical history of Arthritis, Asthma, Cervical radiculopathy at C5 bilaterally and left C7, Diabetes mellitus ([address] Hyperlipidemia, and Hypertension. She has a past surgical history that includes Vaginal prolapse repair; joint replacement; Foot surgery; Hysterectomy; eye surgery; Bunionectomy (11/11/11); and Hammer toe surgery (11/11/11). Her family history includes Cancer in her mother and another family member; High Blood Pressure in her father; Stroke in her father.   She reports that she has never smoked. She has never used smokeless tobacco. She reports current alcohol use. She reports that she does not use drugs. Medications     Current Discharge Medication List      CONTINUE these medications which have NOT CHANGED    Details   glimepiride (AMARYL) 2 MG tablet Take 1 tablet by mouth every morning  Qty: 30 tablet, Refills: 1    Associated Diagnoses: Type 2 diabetes mellitus with hyperglycemia, without long-term current use of insulin ([facility])      dilTIAZem (CARDIZEM CD) 180 MG extended release capsule Take 1 capsule by mouth daily  Qty: 90 capsule, Refills: 0    Associated Diagnoses: Essential hypertension      SITagliptin-metFORMIN (JANUMET XR)  MG TB24 per extended release tablet Take 1 tablet by mouth daily  Qty: 90 tablet, Refills: 0    Comments: D/C GLUCOPHAGE XR  Associated Diagnoses: Type 2 diabetes mellitus with hyperglycemia, without long-term current use of insulin ([facility])      omeprazole (PRILOSEC) 40 MG delayed release capsule Take 1 capsule by mouth daily  Qty: 90 capsule, Refills: 0    Associated Diagnoses: Gastroesophageal reflux disease without esophagitis      rivaroxaban (XARELTO) 20 MG TABS tablet Take 1 tablet by mouth daily  Qty: 30 tablet, Refills: 2    Associated Diagnoses: Acute deep vein thrombosis (DVT) of right lower extremity, unspecified vein ([facility])      liothyronine (CYTOMEL) 25 MCG tablet Take 1 tablet by mouth daily  Qty: 90 tablet, Refills: 0    Associated Diagnoses: Acquired hypothyroidism      atorvastatin (LIPITOR) 20 MG tablet Take 1 tablet by mouth nightly  Qty: 90 tablet, Refills: 0    Comments: D/C ZOCOR  Associated Diagnoses: Other hyperlipidemia      clobetasol (TEMOVATE) 0.05 % ointment Apply topically 2 times daily / PRN.   Qty: 60 g, Refills: 0    Associated Diagnoses: Pruritic rash      Lancets (ONETOUCH DELICA PLUS [id number]) MISC USE TWICE DAILY  Qty: 100 each, Refills: 3    Associated Diagnoses: Type 2 diabetes mellitus without complication, without long-term current use of insulin ([facility])      blood glucose test strips (ONE TOUCH ULTRA TEST) strip 1 each by In Vitro route 2 times daily As needed. Qty: 100 each, Refills: 3    Associated Diagnoses: Type 2 diabetes mellitus without complication, without long-term current use of insulin ([facility])      BREO ELLIPTA 200-25 MCG/INH AEPB inhaler INHALE 1 PUFF INTO THE LUNGS DAILY  Qty: 60 each, Refills: 5      Blood Pressure Monitoring (BLOOD PRESS MONITOR/M-L CUFF) MISC 1 Device by Does not apply route daily  Qty: 1 each, Refills: 0    Associated Diagnoses: Hypertension, uncontrolled      albuterol sulfate HFA (PROVENTIL HFA) 108 (90 Base) MCG/ACT inhaler Inhale 2 puffs into the lungs every 4 hours as needed for Wheezing or Shortness of Breath  Qty: 1 Inhaler, Refills: 5      fluticasone (FLONASE) 50 MCG/ACT nasal spray SHAKE LIQUID AND USE 2 SPRAYS IN EACH NOSTRIL DAILY AS NEEDED FOR RHINITIS OR ALLERGIES  Qty: 1 Bottle, Refills: 0      diclofenac sodium 1 % GEL Apply 4 g topically 4 times daily as needed for Pain  Qty: 1 Tube, Refills: 0    Associated Diagnoses: Right hip pain      Blood Glucose Monitoring Suppl (ONE TOUCH ULTRA 2) w/Device KIT 1 kit by Does not apply route 2 times daily  Qty: 1 kit, Refills: 0    Associated Diagnoses: Type 2 diabetes mellitus without complication, without long-term current use of insulin ([facility])      cetirizine (ZYRTEC) 10 MG tablet Take 10 mg by mouth daily      Cyanocobalamin (B-12) 2500 MCG SUBL Place 2,500 mg under the tongue daily      aspirin 81 MG tablet Take 81 mg by mouth daily      montelukast (SINGULAIR) 10 MG tablet TAKE 1 TABLET BY MOUTH DAILY FOR ALLERGIES  Qty: 30 tablet, Refills: 3      Lift Chair MISC by Does not apply route  Qty: 1 each, Refills: 0      Iron-Vitamins (GERITOL PO) Take 1 tablet by mouth daily. multivitamin (ANTIOXIDANT;PROSIGHT) TABS per tablet Take 1 tablet by mouth daily.                Allergies     She is allergic to amlodipine, breo ellipta [fluticasone furoate-vilanterol], lisinopril, pcn [penicillins], and synthroid [levothyroxine]. Physical Exam     INITIAL VITALS: BP: 129/66, Temp: 98.1 °F (36.7 °C), Pulse: 79, Resp: 23, SpO2: 100 %   Physical Exam  Vitals and nursing note reviewed. Constitutional:       General: She is not in acute distress. HENT:      Head: Normocephalic and atraumatic. Mouth/Throat:      Mouth: Mucous membranes are moist.      Pharynx: No oropharyngeal exudate. Eyes:      General: No scleral icterus. Extraocular Movements: Extraocular movements intact. Conjunctiva/sclera: Conjunctivae normal.      Pupils: Pupils are equal, round, and reactive to light. Cardiovascular:      Rate and Rhythm: Normal rate and regular rhythm. Heart sounds: Normal heart sounds. Pulmonary:      Effort: Pulmonary effort is normal.      Breath sounds: Normal breath sounds. No wheezing, rhonchi or rales. Abdominal:      General: Bowel sounds are normal.      Palpations: Abdomen is soft. Tenderness: There is no abdominal tenderness. There is no guarding or rebound. Musculoskeletal:         General: No swelling. Normal range of motion. Cervical back: Normal range of motion and neck supple. Skin:     General: Skin is warm and dry. Findings: Rash present. Neurological:      General: No focal deficit present. Mental Status: She is alert and oriented to person, place, and time. Cranial Nerves: No cranial nerve deficit. Motor: No weakness. Coordination: Coordination normal.      Comments: Vesicular rash located to scattered areas of the trunk as well as the right elbow. Diagnostic Results     EKG   EKG is interpreted by me shows the patient to be in a normal sinus rhythm with a normal axis, normal MT and QT intervals, normal QRS duration, no ST segment abnormalities, diffuse T wave flattening present. The T wave flattening is new when compared to prior EKGs.     RADIOLOGY:  XR CHEST PORTABLE   Final Result      No acute pulmonary disease.              LABS:   Results for orders placed or performed during the hospital encounter of 08/31/21   CBC auto differential   Result Value Ref Range    WBC 7.8 4.0 - 11.0 K/uL    RBC 3.64 (L) 4.00 - 5.20 M/uL    Hemoglobin 9.5 (L) 12.0 - 16.0 g/dL    Hematocrit 28.9 (L) 36.0 - 48.0 %    MCV 79.3 (L) 80.0 - 100.0 fL    MCH 26.1 26.0 - 34.0 pg    MCHC 33.0 31.0 - 36.0 g/dL    RDW 16.4 (H) 12.4 - 15.4 %    Platelets 480 966 - 114 K/uL    MPV 8.0 5.0 - 10.5 fL    Neutrophils % 69.8 %    Lymphocytes % 20.7 %    Monocytes % 6.8 %    Eosinophils % 2.3 %    Basophils % 0.4 %    Neutrophils Absolute 5.4 1.7 - 7.7 K/uL    Lymphocytes Absolute 1.6 1.0 - 5.1 K/uL    Monocytes Absolute 0.5 0.0 - 1.3 K/uL    Eosinophils Absolute 0.2 0.0 - 0.6 K/uL    Basophils Absolute 0.0 0.0 - 0.2 K/uL   Basic Metabolic Panel (EP - 1)   Result Value Ref Range    Sodium 133 (L) 136 - 145 mmol/L    Potassium 3.1 (L) 3.5 - 5.1 mmol/L    Chloride 97 (L) 99 - 110 mmol/L    CO2 20 (L) 21 - 32 mmol/L    Anion Gap 16 3 - 16    Glucose 144 (H) 70 - 99 mg/dL    BUN 16 7 - 20 mg/dL    CREATININE 1.0 0.6 - 1.2 mg/dL    GFR Non-African American 52 (A) >60    GFR African American >60 >60    Calcium 9.2 8.3 - 10.6 mg/dL   Hepatic function panel (LFTs)   Result Value Ref Range    Total Protein 6.5 6.4 - 8.2 g/dL    Albumin 3.4 3.4 - 5.0 g/dL    Alkaline Phosphatase 66 40 - 129 U/L    ALT 8 (L) 10 - 40 U/L    AST 19 15 - 37 U/L    Total Bilirubin <0.2 0.0 - 1.0 mg/dL    Bilirubin, Direct <0.2 0.0 - 0.3 mg/dL    Bilirubin, Indirect see below 0.0 - 1.0 mg/dL   Troponin   Result Value Ref Range    Troponin <0.01 <0.01 ng/mL   Urinalysis, reflex to microscopic (Lab)   Result Value Ref Range    Color, UA Yellow Straw/Yellow    Clarity, UA Clear Clear    Glucose, Ur Negative Negative mg/dL    Bilirubin Urine Negative Negative    Ketones, Urine Negative Negative mg/dL    Specific Gravity, UA 1.010 1.005 - 1. 030    Blood, Urine Negative Negative    pH, UA 6.0 5.0 - 8.0    Protein, UA Negative Negative mg/dL    Urobilinogen, Urine 0.2 <2.0 E.U./dL    Nitrite, Urine Negative Negative    Leukocyte Esterase, Urine Negative Negative    Microscopic Examination Not Indicated     Urine Type Voided    Magnesium   Result Value Ref Range    Magnesium 1.50 (L) 1.80 - 2.40 mg/dL   EKG 12 Lead   Result Value Ref Range    Ventricular Rate 75 BPM    Atrial Rate 75 BPM    P-R Interval 132 ms    QRS Duration 84 ms    Q-T Interval 430 ms    QTc Calculation (Bazett) 480 ms    P Axis 30 degrees    R Axis 31 degrees    T Axis 96 degrees    Diagnosis        Poor data quality, interpretation may be adversely affectedNormal sinus rhythmNonspecific ST and T wave abnormalityProlonged QTAbnormal ECG     RECENT VITALS:  BP: (!) 153/65,Temp: 98.1 °F (36.7 °C), Pulse: 75, Resp: 16, SpO2: 99 %     Procedures     N/A    ED Course     Nursing Notes, Past Medical Hx, Past Surgical Hx, Social Hx,Allergies, and Family Hx were reviewed. patient was given the following medications:  Orders Placed This Encounter   Medications    lactated ringers bolus    potassium chloride (KLOR-CON M) extended release tablet 40 mEq    magnesium sulfate 1000 mg in dextrose 5% 100 mL IVPB       CONSULTS:  None    MEDICAL DECISIONMAKING / ASSESSMENT / Juaubrey Dodd is a 80 y.o. female who presents complaining of generalized weakness. Patient states that she felt fatigued and generally weak when she got up to go to bed. On arrival, the patient has stable vital signs. She has no focal neurologic deficits present. Laboratory studies are significant for potassium of 3.1 and a magnesium of 1.5 which are most likely due to poor p.o. intake. Hemoglobin is 9.5 which is slightly decreased from her baseline of 10.3. Chest x-ray shows no acute airspace disease. Urinalysis shows no evidence of infection.   The patient's daughter did arrive and confirmed that the patient was feeling lightheaded when she was coming from the bathroom to go to bed. Patient was given IV fluids in the emergency department with improvement of her symptoms. She was written for supplemental potassium and magnesium which she tolerated. I feel most likely the patient's symptoms are secondary to poor p.o. intake leading to dehydration and electrolyte abnormalities. This may be a side effect of her CellCept and she will need to contact her dermatologist to make them aware of this. She will be instructed otherwise to follow-up with her primary care provider. Clinical Impression     1. Dehydration    2. Hypokalemia    3.  Hypomagnesemia        Disposition     PATIENT REFERRED TO:  Ralph Soliman MD  [id number]  [address] [facility] [address]  [place] [address], 1990 [address]  [phone]            DISCHARGE MEDICATIONS:  Current Discharge Medication List          DISPOSITION          Faisal Whiting MD  09/01/21 3089

## 2021-09-07 ENCOUNTER — HOSPITAL ENCOUNTER (INPATIENT)
Age: 86
LOS: 7 days | Discharge: HOME HEALTH | DRG: 329 | End: 2021-09-14
Attending: EMERGENCY MEDICINE | Admitting: SURGERY
Payer: MEDICARE

## 2021-09-07 ENCOUNTER — APPOINTMENT (OUTPATIENT)
Dept: CT IMAGING | Age: 86
DRG: 329 | End: 2021-09-07
Payer: MEDICARE

## 2021-09-07 ENCOUNTER — ANESTHESIA EVENT (OUTPATIENT)
Dept: OPERATING ROOM | Age: 86
DRG: 329 | End: 2021-09-07
Payer: MEDICARE

## 2021-09-07 ENCOUNTER — ANESTHESIA (OUTPATIENT)
Dept: OPERATING ROOM | Age: 86
DRG: 329 | End: 2021-09-07
Payer: MEDICARE

## 2021-09-07 VITALS — OXYGEN SATURATION: 100 % | TEMPERATURE: 98.1 F | SYSTOLIC BLOOD PRESSURE: 184 MMHG | DIASTOLIC BLOOD PRESSURE: 75 MMHG

## 2021-09-07 DIAGNOSIS — K26.5 DUODENAL ULCER WITH PERFORATION (HCC): Primary | ICD-10-CM

## 2021-09-07 PROBLEM — K63.1 DUODENAL PERFORATION (HCC): Status: ACTIVE | Noted: 2021-09-07

## 2021-09-07 LAB
A/G RATIO: 1.1 (ref 1.1–2.2)
ABO/RH: NORMAL
ALBUMIN SERPL-MCNC: 3.4 G/DL (ref 3.4–5)
ALP BLD-CCNC: 68 U/L (ref 40–129)
ALT SERPL-CCNC: 10 U/L (ref 10–40)
ANION GAP SERPL CALCULATED.3IONS-SCNC: 13 MMOL/L (ref 3–16)
ANTIBODY SCREEN: NORMAL
AST SERPL-CCNC: 25 U/L (ref 15–37)
BASE EXCESS VENOUS: 2.3 MMOL/L (ref -2–3)
BASOPHILS ABSOLUTE: 0.1 K/UL (ref 0–0.2)
BASOPHILS RELATIVE PERCENT: 1 %
BILIRUB SERPL-MCNC: <0.2 MG/DL (ref 0–1)
BILIRUBIN URINE: NEGATIVE
BLOOD BANK DISPENSE STATUS: NORMAL
BLOOD BANK PRODUCT CODE: NORMAL
BLOOD, URINE: NEGATIVE
BPU ID: NORMAL
BUN BLDV-MCNC: 15 MG/DL (ref 7–20)
CALCIUM SERPL-MCNC: 9.3 MG/DL (ref 8.3–10.6)
CARBOXYHEMOGLOBIN: 1 % (ref 0–1.5)
CHLORIDE BLD-SCNC: 104 MMOL/L (ref 99–110)
CLARITY: CLEAR
CO2: 23 MMOL/L (ref 21–32)
COLOR: YELLOW
CREAT SERPL-MCNC: 0.8 MG/DL (ref 0.6–1.2)
DESCRIPTION BLOOD BANK: NORMAL
EKG ATRIAL RATE: 91 BPM
EKG DIAGNOSIS: NORMAL
EKG P AXIS: -5 DEGREES
EKG P-R INTERVAL: 128 MS
EKG Q-T INTERVAL: 362 MS
EKG QRS DURATION: 78 MS
EKG QTC CALCULATION (BAZETT): 438 MS
EKG R AXIS: 16 DEGREES
EKG T AXIS: 47 DEGREES
EKG VENTRICULAR RATE: 88 BPM
EOSINOPHILS ABSOLUTE: 0.2 K/UL (ref 0–0.6)
EOSINOPHILS RELATIVE PERCENT: 3.6 %
GFR AFRICAN AMERICAN: >60
GFR NON-AFRICAN AMERICAN: >60
GLOBULIN: 3 G/DL
GLUCOSE BLD-MCNC: 104 MG/DL (ref 70–99)
GLUCOSE BLD-MCNC: 116 MG/DL (ref 70–99)
GLUCOSE BLD-MCNC: 169 MG/DL (ref 70–99)
GLUCOSE BLD-MCNC: 193 MG/DL (ref 70–99)
GLUCOSE URINE: NEGATIVE MG/DL
HCO3 VENOUS: 27.5 MMOL/L (ref 24–28)
HCT VFR BLD CALC: 29.4 % (ref 36–48)
HEMOGLOBIN, VEN, REDUCED: 44.8 %
HEMOGLOBIN: 9.6 G/DL (ref 12–16)
INR BLD: 1.88 (ref 0.88–1.12)
KETONES, URINE: ABNORMAL MG/DL
LACTIC ACID, SEPSIS: 1.4 MMOL/L (ref 0.4–1.9)
LEUKOCYTE ESTERASE, URINE: NEGATIVE
LIPASE: 34 U/L (ref 13–60)
LYMPHOCYTES ABSOLUTE: 1.7 K/UL (ref 1–5.1)
LYMPHOCYTES RELATIVE PERCENT: 29.8 %
MCH RBC QN AUTO: 26.3 PG (ref 26–34)
MCHC RBC AUTO-ENTMCNC: 32.5 G/DL (ref 31–36)
MCV RBC AUTO: 80.9 FL (ref 80–100)
METHEMOGLOBIN VENOUS: 0.1 % (ref 0–1.5)
MICROSCOPIC EXAMINATION: ABNORMAL
MONOCYTES ABSOLUTE: 0.4 K/UL (ref 0–1.3)
MONOCYTES RELATIVE PERCENT: 8.1 %
NEUTROPHILS ABSOLUTE: 3.2 K/UL (ref 1.7–7.7)
NEUTROPHILS RELATIVE PERCENT: 57.5 %
NITRITE, URINE: NEGATIVE
O2 SAT, VEN: 55 %
PCO2, VEN: 44.5 MMHG (ref 41–51)
PDW BLD-RTO: 17 % (ref 12.4–15.4)
PERFORMED ON: ABNORMAL
PH UA: 6 (ref 5–8)
PH VENOUS: 7.4 (ref 7.35–7.45)
PLATELET # BLD: 348 K/UL (ref 135–450)
PMV BLD AUTO: 8.3 FL (ref 5–10.5)
PO2, VEN: 31.9 MMHG (ref 25–40)
POTASSIUM REFLEX MAGNESIUM: 5 MMOL/L (ref 3.5–5.1)
PROTEIN UA: NEGATIVE MG/DL
PROTHROMBIN TIME: 21.8 SEC (ref 9.9–12.7)
RBC # BLD: 3.64 M/UL (ref 4–5.2)
SODIUM BLD-SCNC: 140 MMOL/L (ref 136–145)
SPECIFIC GRAVITY UA: 1.01 (ref 1–1.03)
TCO2 CALC VENOUS: 29 MMOL/L
TOTAL PROTEIN: 6.4 G/DL (ref 6.4–8.2)
TROPONIN: <0.01 NG/ML
URINE TYPE: ABNORMAL
UROBILINOGEN, URINE: 0.2 E.U./DL
WBC # BLD: 5.5 K/UL (ref 4–11)

## 2021-09-07 PROCEDURE — 86901 BLOOD TYPING SEROLOGIC RH(D): CPT

## 2021-09-07 PROCEDURE — 3600000004 HC SURGERY LEVEL 4 BASE: Performed by: SURGERY

## 2021-09-07 PROCEDURE — 86923 COMPATIBILITY TEST ELECTRIC: CPT

## 2021-09-07 PROCEDURE — 86900 BLOOD TYPING SEROLOGIC ABO: CPT

## 2021-09-07 PROCEDURE — 85610 PROTHROMBIN TIME: CPT

## 2021-09-07 PROCEDURE — 44238 UNLISTED LAPS PX INTESTINE: CPT | Performed by: SURGERY

## 2021-09-07 PROCEDURE — 43235 EGD DIAGNOSTIC BRUSH WASH: CPT | Performed by: SURGERY

## 2021-09-07 PROCEDURE — 96375 TX/PRO/DX INJ NEW DRUG ADDON: CPT

## 2021-09-07 PROCEDURE — 2500000003 HC RX 250 WO HCPCS: Performed by: NURSE ANESTHETIST, CERTIFIED REGISTERED

## 2021-09-07 PROCEDURE — 2500000003 HC RX 250 WO HCPCS: Performed by: EMERGENCY MEDICINE

## 2021-09-07 PROCEDURE — 2709999900 HC NON-CHARGEABLE SUPPLY: Performed by: SURGERY

## 2021-09-07 PROCEDURE — 2580000003 HC RX 258: Performed by: SURGERY

## 2021-09-07 PROCEDURE — 36415 COLL VENOUS BLD VENIPUNCTURE: CPT

## 2021-09-07 PROCEDURE — C9113 INJ PANTOPRAZOLE SODIUM, VIA: HCPCS

## 2021-09-07 PROCEDURE — 2700000000 HC OXYGEN THERAPY PER DAY

## 2021-09-07 PROCEDURE — 96365 THER/PROPH/DIAG IV INF INIT: CPT

## 2021-09-07 PROCEDURE — 2580000003 HC RX 258: Performed by: STUDENT IN AN ORGANIZED HEALTH CARE EDUCATION/TRAINING PROGRAM

## 2021-09-07 PROCEDURE — 96368 THER/DIAG CONCURRENT INF: CPT

## 2021-09-07 PROCEDURE — 49329 UNLSTD LAPS PX ABD PERTM&OMN: CPT | Performed by: SURGERY

## 2021-09-07 PROCEDURE — 86850 RBC ANTIBODY SCREEN: CPT

## 2021-09-07 PROCEDURE — 6360000002 HC RX W HCPCS: Performed by: NURSE ANESTHETIST, CERTIFIED REGISTERED

## 2021-09-07 PROCEDURE — 74177 CT ABD & PELVIS W/CONTRAST: CPT

## 2021-09-07 PROCEDURE — 99284 EMERGENCY DEPT VISIT MOD MDM: CPT

## 2021-09-07 PROCEDURE — 93005 ELECTROCARDIOGRAM TRACING: CPT | Performed by: STUDENT IN AN ORGANIZED HEALTH CARE EDUCATION/TRAINING PROGRAM

## 2021-09-07 PROCEDURE — 96376 TX/PRO/DX INJ SAME DRUG ADON: CPT

## 2021-09-07 PROCEDURE — P9017 PLASMA 1 DONOR FRZ W/IN 8 HR: HCPCS

## 2021-09-07 PROCEDURE — 6360000002 HC RX W HCPCS: Performed by: STUDENT IN AN ORGANIZED HEALTH CARE EDUCATION/TRAINING PROGRAM

## 2021-09-07 PROCEDURE — 7100000000 HC PACU RECOVERY - FIRST 15 MIN: Performed by: SURGERY

## 2021-09-07 PROCEDURE — 81003 URINALYSIS AUTO W/O SCOPE: CPT

## 2021-09-07 PROCEDURE — 71260 CT THORAX DX C+: CPT

## 2021-09-07 PROCEDURE — 99223 1ST HOSP IP/OBS HIGH 75: CPT | Performed by: SURGERY

## 2021-09-07 PROCEDURE — 6360000002 HC RX W HCPCS: Performed by: EMERGENCY MEDICINE

## 2021-09-07 PROCEDURE — 85025 COMPLETE CBC W/AUTO DIFF WBC: CPT

## 2021-09-07 PROCEDURE — 2000000000 HC ICU R&B

## 2021-09-07 PROCEDURE — 6360000002 HC RX W HCPCS

## 2021-09-07 PROCEDURE — 2720000010 HC SURG SUPPLY STERILE: Performed by: SURGERY

## 2021-09-07 PROCEDURE — 84484 ASSAY OF TROPONIN QUANT: CPT

## 2021-09-07 PROCEDURE — 2580000003 HC RX 258: Performed by: NURSE ANESTHETIST, CERTIFIED REGISTERED

## 2021-09-07 PROCEDURE — 6360000004 HC RX CONTRAST MEDICATION: Performed by: EMERGENCY MEDICINE

## 2021-09-07 PROCEDURE — 80053 COMPREHEN METABOLIC PANEL: CPT

## 2021-09-07 PROCEDURE — 82803 BLOOD GASES ANY COMBINATION: CPT

## 2021-09-07 PROCEDURE — 2580000003 HC RX 258: Performed by: EMERGENCY MEDICINE

## 2021-09-07 PROCEDURE — 94150 VITAL CAPACITY TEST: CPT

## 2021-09-07 PROCEDURE — 7100000001 HC PACU RECOVERY - ADDTL 15 MIN: Performed by: SURGERY

## 2021-09-07 PROCEDURE — 3600000014 HC SURGERY LEVEL 4 ADDTL 15MIN: Performed by: SURGERY

## 2021-09-07 PROCEDURE — 3700000000 HC ANESTHESIA ATTENDED CARE: Performed by: SURGERY

## 2021-09-07 PROCEDURE — 6370000000 HC RX 637 (ALT 250 FOR IP): Performed by: STUDENT IN AN ORGANIZED HEALTH CARE EDUCATION/TRAINING PROGRAM

## 2021-09-07 PROCEDURE — 83690 ASSAY OF LIPASE: CPT

## 2021-09-07 PROCEDURE — C9113 INJ PANTOPRAZOLE SODIUM, VIA: HCPCS | Performed by: STUDENT IN AN ORGANIZED HEALTH CARE EDUCATION/TRAINING PROGRAM

## 2021-09-07 PROCEDURE — 83605 ASSAY OF LACTIC ACID: CPT

## 2021-09-07 PROCEDURE — 3700000001 HC ADD 15 MINUTES (ANESTHESIA): Performed by: SURGERY

## 2021-09-07 RX ORDER — FLUCONAZOLE 2 MG/ML
400 INJECTION, SOLUTION INTRAVENOUS EVERY 24 HOURS
Status: COMPLETED | OUTPATIENT
Start: 2021-09-07 | End: 2021-09-11

## 2021-09-07 RX ORDER — PROCHLORPERAZINE EDISYLATE 5 MG/ML
5 INJECTION INTRAMUSCULAR; INTRAVENOUS
Status: DISCONTINUED | OUTPATIENT
Start: 2021-09-07 | End: 2021-09-07 | Stop reason: HOSPADM

## 2021-09-07 RX ORDER — HYDRALAZINE HYDROCHLORIDE 20 MG/ML
5 INJECTION INTRAMUSCULAR; INTRAVENOUS EVERY 10 MIN PRN
Status: DISCONTINUED | OUTPATIENT
Start: 2021-09-07 | End: 2021-09-07 | Stop reason: HOSPADM

## 2021-09-07 RX ORDER — MORPHINE SULFATE 2 MG/ML
2 INJECTION, SOLUTION INTRAMUSCULAR; INTRAVENOUS
Status: DISCONTINUED | OUTPATIENT
Start: 2021-09-07 | End: 2021-09-12

## 2021-09-07 RX ORDER — MORPHINE SULFATE 4 MG/ML
4 INJECTION, SOLUTION INTRAMUSCULAR; INTRAVENOUS
Status: DISCONTINUED | OUTPATIENT
Start: 2021-09-07 | End: 2021-09-12

## 2021-09-07 RX ORDER — OXYCODONE HYDROCHLORIDE AND ACETAMINOPHEN 5; 325 MG/1; MG/1
2 TABLET ORAL PRN
Status: DISCONTINUED | OUTPATIENT
Start: 2021-09-07 | End: 2021-09-07 | Stop reason: HOSPADM

## 2021-09-07 RX ORDER — DEXTROSE MONOHYDRATE 50 MG/ML
100 INJECTION, SOLUTION INTRAVENOUS PRN
Status: DISCONTINUED | OUTPATIENT
Start: 2021-09-07 | End: 2021-09-14 | Stop reason: HOSPADM

## 2021-09-07 RX ORDER — SODIUM CHLORIDE 9 MG/ML
INJECTION, SOLUTION INTRAVENOUS CONTINUOUS PRN
Status: DISCONTINUED | OUTPATIENT
Start: 2021-09-07 | End: 2021-09-07 | Stop reason: SDUPTHER

## 2021-09-07 RX ORDER — LABETALOL HYDROCHLORIDE 5 MG/ML
5 INJECTION, SOLUTION INTRAVENOUS EVERY 10 MIN PRN
Status: DISCONTINUED | OUTPATIENT
Start: 2021-09-07 | End: 2021-09-07 | Stop reason: HOSPADM

## 2021-09-07 RX ORDER — ONDANSETRON 2 MG/ML
4 INJECTION INTRAMUSCULAR; INTRAVENOUS
Status: DISCONTINUED | OUTPATIENT
Start: 2021-09-07 | End: 2021-09-07 | Stop reason: HOSPADM

## 2021-09-07 RX ORDER — MORPHINE SULFATE 4 MG/ML
4 INJECTION, SOLUTION INTRAMUSCULAR; INTRAVENOUS ONCE
Status: COMPLETED | OUTPATIENT
Start: 2021-09-07 | End: 2021-09-07

## 2021-09-07 RX ORDER — 0.9 % SODIUM CHLORIDE 0.9 %
1000 INTRAVENOUS SOLUTION INTRAVENOUS ONCE
Status: COMPLETED | OUTPATIENT
Start: 2021-09-07 | End: 2021-09-07

## 2021-09-07 RX ORDER — MORPHINE SULFATE 2 MG/ML
2 INJECTION, SOLUTION INTRAMUSCULAR; INTRAVENOUS ONCE
Status: COMPLETED | OUTPATIENT
Start: 2021-09-07 | End: 2021-09-07

## 2021-09-07 RX ORDER — LIDOCAINE HCL/PF 100 MG/5ML
SYRINGE (ML) INJECTION PRN
Status: DISCONTINUED | OUTPATIENT
Start: 2021-09-07 | End: 2021-09-07 | Stop reason: SDUPTHER

## 2021-09-07 RX ORDER — SODIUM CHLORIDE 9 MG/ML
INJECTION, SOLUTION INTRAVENOUS CONTINUOUS
Status: DISCONTINUED | OUTPATIENT
Start: 2021-09-07 | End: 2021-09-13

## 2021-09-07 RX ORDER — ONDANSETRON 2 MG/ML
INJECTION INTRAMUSCULAR; INTRAVENOUS PRN
Status: DISCONTINUED | OUTPATIENT
Start: 2021-09-07 | End: 2021-09-07 | Stop reason: SDUPTHER

## 2021-09-07 RX ORDER — DIPHENHYDRAMINE HYDROCHLORIDE 50 MG/ML
12.5 INJECTION INTRAMUSCULAR; INTRAVENOUS
Status: DISCONTINUED | OUTPATIENT
Start: 2021-09-07 | End: 2021-09-07 | Stop reason: HOSPADM

## 2021-09-07 RX ORDER — ROCURONIUM BROMIDE 10 MG/ML
INJECTION, SOLUTION INTRAVENOUS PRN
Status: DISCONTINUED | OUTPATIENT
Start: 2021-09-07 | End: 2021-09-07 | Stop reason: SDUPTHER

## 2021-09-07 RX ORDER — CIPROFLOXACIN 2 MG/ML
400 INJECTION, SOLUTION INTRAVENOUS ONCE
Status: COMPLETED | OUTPATIENT
Start: 2021-09-07 | End: 2021-09-07

## 2021-09-07 RX ORDER — SODIUM CHLORIDE, SODIUM LACTATE, POTASSIUM CHLORIDE, AND CALCIUM CHLORIDE .6; .31; .03; .02 G/100ML; G/100ML; G/100ML; G/100ML
IRRIGANT IRRIGATION PRN
Status: DISCONTINUED | OUTPATIENT
Start: 2021-09-07 | End: 2021-09-07 | Stop reason: HOSPADM

## 2021-09-07 RX ORDER — FENTANYL CITRATE 50 UG/ML
25 INJECTION, SOLUTION INTRAMUSCULAR; INTRAVENOUS EVERY 5 MIN PRN
Status: DISCONTINUED | OUTPATIENT
Start: 2021-09-07 | End: 2021-09-07 | Stop reason: HOSPADM

## 2021-09-07 RX ORDER — OXYCODONE HYDROCHLORIDE AND ACETAMINOPHEN 5; 325 MG/1; MG/1
1 TABLET ORAL PRN
Status: DISCONTINUED | OUTPATIENT
Start: 2021-09-07 | End: 2021-09-07 | Stop reason: HOSPADM

## 2021-09-07 RX ORDER — 0.9 % SODIUM CHLORIDE 0.9 %
1000 INTRAVENOUS SOLUTION INTRAVENOUS ONCE
Status: DISCONTINUED | OUTPATIENT
Start: 2021-09-07 | End: 2021-09-07

## 2021-09-07 RX ORDER — HYDRALAZINE HYDROCHLORIDE 20 MG/ML
5 INJECTION INTRAMUSCULAR; INTRAVENOUS EVERY 6 HOURS PRN
Status: DISCONTINUED | OUTPATIENT
Start: 2021-09-07 | End: 2021-09-14 | Stop reason: HOSPADM

## 2021-09-07 RX ORDER — NICOTINE POLACRILEX 4 MG
15 LOZENGE BUCCAL PRN
Status: DISCONTINUED | OUTPATIENT
Start: 2021-09-07 | End: 2021-09-14 | Stop reason: HOSPADM

## 2021-09-07 RX ORDER — MAGNESIUM HYDROXIDE 1200 MG/15ML
LIQUID ORAL CONTINUOUS PRN
Status: COMPLETED | OUTPATIENT
Start: 2021-09-07 | End: 2021-09-07

## 2021-09-07 RX ORDER — DEXTROSE MONOHYDRATE 25 G/50ML
12.5 INJECTION, SOLUTION INTRAVENOUS PRN
Status: DISCONTINUED | OUTPATIENT
Start: 2021-09-07 | End: 2021-09-14 | Stop reason: HOSPADM

## 2021-09-07 RX ORDER — FENTANYL CITRATE 50 UG/ML
INJECTION, SOLUTION INTRAMUSCULAR; INTRAVENOUS PRN
Status: DISCONTINUED | OUTPATIENT
Start: 2021-09-07 | End: 2021-09-07 | Stop reason: SDUPTHER

## 2021-09-07 RX ORDER — FENTANYL CITRATE 50 UG/ML
50 INJECTION, SOLUTION INTRAMUSCULAR; INTRAVENOUS EVERY 5 MIN PRN
Status: DISCONTINUED | OUTPATIENT
Start: 2021-09-07 | End: 2021-09-07 | Stop reason: HOSPADM

## 2021-09-07 RX ORDER — SUCCINYLCHOLINE/SOD CL,ISO/PF 200MG/10ML
SYRINGE (ML) INTRAVENOUS PRN
Status: DISCONTINUED | OUTPATIENT
Start: 2021-09-07 | End: 2021-09-07 | Stop reason: SDUPTHER

## 2021-09-07 RX ORDER — PANTOPRAZOLE SODIUM 40 MG/10ML
40 INJECTION, POWDER, LYOPHILIZED, FOR SOLUTION INTRAVENOUS 2 TIMES DAILY
Status: DISCONTINUED | OUTPATIENT
Start: 2021-09-07 | End: 2021-09-14 | Stop reason: HOSPADM

## 2021-09-07 RX ORDER — PROPOFOL 10 MG/ML
INJECTION, EMULSION INTRAVENOUS PRN
Status: DISCONTINUED | OUTPATIENT
Start: 2021-09-07 | End: 2021-09-07 | Stop reason: SDUPTHER

## 2021-09-07 RX ORDER — SODIUM CHLORIDE 9 MG/ML
INJECTION, SOLUTION INTRAVENOUS PRN
Status: DISCONTINUED | OUTPATIENT
Start: 2021-09-07 | End: 2021-09-07

## 2021-09-07 RX ADMIN — FENTANYL CITRATE 50 MCG: 50 INJECTION, SOLUTION INTRAMUSCULAR; INTRAVENOUS at 13:59

## 2021-09-07 RX ADMIN — SODIUM CHLORIDE: 9 INJECTION, SOLUTION INTRAVENOUS at 13:45

## 2021-09-07 RX ADMIN — FENTANYL CITRATE 25 MCG: 50 INJECTION, SOLUTION INTRAMUSCULAR; INTRAVENOUS at 13:50

## 2021-09-07 RX ADMIN — METHOCARBAMOL 500 MG: 100 INJECTION, SOLUTION INTRAMUSCULAR; INTRAVENOUS at 17:24

## 2021-09-07 RX ADMIN — IOPAMIDOL 80 ML: 755 INJECTION, SOLUTION INTRAVENOUS at 09:16

## 2021-09-07 RX ADMIN — PANTOPRAZOLE SODIUM 40 MG: 40 INJECTION, POWDER, FOR SOLUTION INTRAVENOUS at 21:32

## 2021-09-07 RX ADMIN — SODIUM CHLORIDE: 9 INJECTION, SOLUTION INTRAVENOUS at 13:36

## 2021-09-07 RX ADMIN — SODIUM CHLORIDE: 9 INJECTION, SOLUTION INTRAVENOUS at 16:31

## 2021-09-07 RX ADMIN — METRONIDAZOLE 500 MG: 500 INJECTION, SOLUTION INTRAVENOUS at 10:27

## 2021-09-07 RX ADMIN — SODIUM CHLORIDE 1000 ML: 9 INJECTION, SOLUTION INTRAVENOUS at 06:52

## 2021-09-07 RX ADMIN — ONDANSETRON 4 MG: 2 INJECTION INTRAMUSCULAR; INTRAVENOUS at 13:51

## 2021-09-07 RX ADMIN — FENTANYL CITRATE 25 MCG: 50 INJECTION, SOLUTION INTRAMUSCULAR; INTRAVENOUS at 15:15

## 2021-09-07 RX ADMIN — SUGAMMADEX 200 MG: 100 INJECTION, SOLUTION INTRAVENOUS at 15:11

## 2021-09-07 RX ADMIN — FENTANYL CITRATE 25 MCG: 50 INJECTION, SOLUTION INTRAMUSCULAR; INTRAVENOUS at 13:36

## 2021-09-07 RX ADMIN — PANTOPRAZOLE SODIUM 40 MG: 40 INJECTION, POWDER, FOR SOLUTION INTRAVENOUS at 11:00

## 2021-09-07 RX ADMIN — FLUCONAZOLE 400 MG: 2 INJECTION, SOLUTION INTRAVENOUS at 13:36

## 2021-09-07 RX ADMIN — FAMOTIDINE 20 MG: 10 INJECTION, SOLUTION INTRAVENOUS at 13:55

## 2021-09-07 RX ADMIN — Medication 120 MG: at 13:36

## 2021-09-07 RX ADMIN — MORPHINE SULFATE 4 MG: 4 INJECTION INTRAVENOUS at 07:49

## 2021-09-07 RX ADMIN — FENTANYL CITRATE 25 MCG: 50 INJECTION, SOLUTION INTRAMUSCULAR; INTRAVENOUS at 15:14

## 2021-09-07 RX ADMIN — PROPOFOL 120 MG: 10 INJECTION, EMULSION INTRAVENOUS at 13:36

## 2021-09-07 RX ADMIN — ROCURONIUM BROMIDE 50 MG: 10 INJECTION INTRAVENOUS at 13:43

## 2021-09-07 RX ADMIN — MORPHINE SULFATE 4 MG: 4 INJECTION INTRAVENOUS at 10:22

## 2021-09-07 RX ADMIN — Medication 100 MG: at 13:36

## 2021-09-07 RX ADMIN — INSULIN HUMAN 2 UNITS: 100 INJECTION, SOLUTION PARENTERAL at 18:26

## 2021-09-07 RX ADMIN — SODIUM CHLORIDE: 9 INJECTION, SOLUTION INTRAVENOUS at 11:02

## 2021-09-07 RX ADMIN — FENTANYL CITRATE 50 MCG: 50 INJECTION, SOLUTION INTRAMUSCULAR; INTRAVENOUS at 14:21

## 2021-09-07 RX ADMIN — MORPHINE SULFATE 2 MG: 2 INJECTION, SOLUTION INTRAMUSCULAR; INTRAVENOUS at 06:53

## 2021-09-07 RX ADMIN — CIPROFLOXACIN 400 MG: 2 INJECTION, SOLUTION INTRAVENOUS at 10:22

## 2021-09-07 ASSESSMENT — PULMONARY FUNCTION TESTS
PIF_VALUE: 5
PIF_VALUE: 32
PIF_VALUE: 23
PIF_VALUE: 23
PIF_VALUE: 30
PIF_VALUE: 19
PIF_VALUE: 30
PIF_VALUE: 33
PIF_VALUE: 19
PIF_VALUE: 6
PIF_VALUE: 29
PIF_VALUE: 20
PIF_VALUE: 32
PIF_VALUE: 30
PIF_VALUE: 34
PIF_VALUE: 19
PIF_VALUE: 30
PIF_VALUE: 26
PIF_VALUE: 31
PIF_VALUE: 31
PIF_VALUE: 19
PIF_VALUE: 0
PIF_VALUE: 19
PIF_VALUE: 30
PIF_VALUE: 30
PIF_VALUE: 0
PIF_VALUE: 30
PIF_VALUE: 18
PIF_VALUE: 28
PIF_VALUE: 30
PIF_VALUE: 30
PIF_VALUE: 31
PIF_VALUE: 32
PIF_VALUE: 29
PIF_VALUE: 30
PIF_VALUE: 29
PIF_VALUE: 26
PIF_VALUE: 19
PIF_VALUE: 30
PIF_VALUE: 30
PIF_VALUE: 17
PIF_VALUE: 29
PIF_VALUE: 30
PIF_VALUE: 29
PIF_VALUE: 19
PIF_VALUE: 29
PIF_VALUE: 30
PIF_VALUE: 30
PIF_VALUE: 26
PIF_VALUE: 19
PIF_VALUE: 23
PIF_VALUE: 30
PIF_VALUE: 22
PIF_VALUE: 19
PIF_VALUE: 30
PIF_VALUE: 3
PIF_VALUE: 29
PIF_VALUE: 34
PIF_VALUE: 30
PIF_VALUE: 6
PIF_VALUE: 19
PIF_VALUE: 29
PIF_VALUE: 0
PIF_VALUE: 21
PIF_VALUE: 29
PIF_VALUE: 27
PIF_VALUE: 23
PIF_VALUE: 18
PIF_VALUE: 1
PIF_VALUE: 30
PIF_VALUE: 25
PIF_VALUE: 19
PIF_VALUE: 1
PIF_VALUE: 19
PIF_VALUE: 1
PIF_VALUE: 18
PIF_VALUE: 19
PIF_VALUE: 0
PIF_VALUE: 3
PIF_VALUE: 19
PIF_VALUE: 26
PIF_VALUE: 30
PIF_VALUE: 30
PIF_VALUE: 19
PIF_VALUE: 19
PIF_VALUE: 32
PIF_VALUE: 19
PIF_VALUE: 26
PIF_VALUE: 2
PIF_VALUE: 19
PIF_VALUE: 1
PIF_VALUE: 32
PIF_VALUE: 33
PIF_VALUE: 19
PIF_VALUE: 32
PIF_VALUE: 30
PIF_VALUE: 18
PIF_VALUE: 29
PIF_VALUE: 7
PIF_VALUE: 30
PIF_VALUE: 21
PIF_VALUE: 18
PIF_VALUE: 10
PIF_VALUE: 25
PIF_VALUE: 30
PIF_VALUE: 32
PIF_VALUE: 30
PIF_VALUE: 1
PIF_VALUE: 30

## 2021-09-07 ASSESSMENT — PAIN SCALES - GENERAL
PAINLEVEL_OUTOF10: 5
PAINLEVEL_OUTOF10: 8
PAINLEVEL_OUTOF10: 0
PAINLEVEL_OUTOF10: 10
PAINLEVEL_OUTOF10: 0
PAINLEVEL_OUTOF10: 8
PAINLEVEL_OUTOF10: 0
PAINLEVEL_OUTOF10: 0

## 2021-09-07 ASSESSMENT — ENCOUNTER SYMPTOMS
ANAL BLEEDING: 0
COUGH: 0
BACK PAIN: 0
DIARRHEA: 0
ABDOMINAL PAIN: 1
VOMITING: 0
NAUSEA: 0

## 2021-09-07 ASSESSMENT — PAIN DESCRIPTION - ORIENTATION: ORIENTATION: RIGHT;UPPER

## 2021-09-07 ASSESSMENT — PAIN DESCRIPTION - LOCATION: LOCATION: ABDOMEN

## 2021-09-07 NOTE — FLOWSHEET NOTE
09/07/21 1734   Family/Significant Other Communication   Reason patient updates. Name [facility]. Relationship Child   Response updates appreciated. Method of Communication In Person       Patient Child, Negar Mcclain, updated on patient condition with all questions answered. Will continue to keep updated.

## 2021-09-07 NOTE — PROGRESS NOTES
PRE-OP NOTE  Department of Surgery      Chief Complaint or Reason for Surgery: Intra abdominal free air    Procedure: Diagnostic laparoscopy possible laparotomy  Expected time: Add on     Plan  1. Diet: NPO now  2. IVF:   3. Antibiotics: Cipro flagyl  4. Labs to be drawn: Type and Screen  5. Anesthesia: to see patient  6. Consent: Will obtain and place in chart  7. Pulmonary: CXR: reviewed  8. Cardiac: EKG: reviewed  9.  Pregnancy test: N/A    Charla Severance, DO  09/07/21  11:01 AM

## 2021-09-07 NOTE — H&P
General Surgery   Resident H&P    Reason for consult: perforated duodenal bulb ulcer    Chief Complaint: RUQ abdominal pain    History obtained from: patient/EMR    History of Present Illness :    Eliana Becker is a 80 y.o. female with history of asthma, DM, HLD, HTN, GERD, and pemphigus foliaceous treated with Cellcept with RUQ abdominal pain that began today. Patient reports the pain is under the R breast, and radiates across the the abdomen and into the R shoulder. Patient denies fever, chills, nausea, or vomiting. Patient denies having any similar pain. Patient reports she had recently been taking medications for acid reflux but stopped 2 months ago due to Pemphigoid reaction. Patient was started on Cellcept for pemphigus 2 months ago. Patient hysterectomy conducted many years ago; otherwise no previous abdominal surgeries. Patient denies having an endoscopy. Patient is currently on Xarelto for previous hx of DVT, and last dose was yesterday. Patient had been seen in ED one week ago due to feeling of generalized weakness and was treated for dehydration with IVF and supplemental electrolytes. Past Medical History:        Diagnosis Date    Arthritis     Asthma     Cervical radiculopathy at C5 bilaterally and left C7 2/3/2016    Diabetes mellitus ([facility]     Hyperlipidemia     Hypertension        Past Surgical History:           Procedure Laterality Date    BUNIONECTOMY  11/11/11    EYE SURGERY      bilateral cataracts    FOOT SURGERY      left heel spurs    HAMMER TOE SURGERY  11/11/11    HYSTERECTOMY      JOINT REPLACEMENT      sriram knees    VAGINAL PROLAPSE REPAIR         Allergies:  Amlodipine, Breo ellipta [fluticasone furoate-vilanterol], Lisinopril, Pcn [penicillins], and Synthroid [levothyroxine]    Medications:   Home Meds  No current facility-administered medications on file prior to encounter.      Current Outpatient Medications on File Prior to Encounter   Medication Sig Dispense Refill  glimepiride (AMARYL) 2 MG tablet Take 1 tablet by mouth every morning 30 tablet 1    dilTIAZem (CARDIZEM CD) 180 MG extended release capsule Take 1 capsule by mouth daily 90 capsule 0    SITagliptin-metFORMIN (JANUMET XR)  MG TB24 per extended release tablet Take 1 tablet by mouth daily 90 tablet 0    omeprazole (PRILOSEC) 40 MG delayed release capsule Take 1 capsule by mouth daily 90 capsule 0    rivaroxaban (XARELTO) 20 MG TABS tablet Take 1 tablet by mouth daily 30 tablet 2    liothyronine (CYTOMEL) 25 MCG tablet Take 1 tablet by mouth daily 90 tablet 0    atorvastatin (LIPITOR) 20 MG tablet Take 1 tablet by mouth nightly 90 tablet 0    clobetasol (TEMOVATE) 0.05 % ointment Apply topically 2 times daily / PRN. 60 g 0    Lancets (ONETOUCH DELICA PLUS [id number]) MISC USE TWICE DAILY 100 each 3    blood glucose test strips (ONE TOUCH ULTRA TEST) strip 1 each by In Vitro route 2 times daily As needed.  100 each 3    BREO ELLIPTA 200-25 MCG/INH AEPB inhaler INHALE 1 PUFF INTO THE LUNGS DAILY 60 each 5    Blood Pressure Monitoring (BLOOD PRESS MONITOR/M-L CUFF) MISC 1 Device by Does not apply route daily 1 each 0    albuterol sulfate HFA (PROVENTIL HFA) 108 (90 Base) MCG/ACT inhaler Inhale 2 puffs into the lungs every 4 hours as needed for Wheezing or Shortness of Breath 1 Inhaler 5    fluticasone (FLONASE) 50 MCG/ACT nasal spray SHAKE LIQUID AND USE 2 SPRAYS IN EACH NOSTRIL DAILY AS NEEDED FOR RHINITIS OR ALLERGIES 1 Bottle 0    diclofenac sodium 1 % GEL Apply 4 g topically 4 times daily as needed for Pain 1 Tube 0    Blood Glucose Monitoring Suppl (ONE TOUCH ULTRA 2) w/Device KIT 1 kit by Does not apply route 2 times daily 1 kit 0    cetirizine (ZYRTEC) 10 MG tablet Take 10 mg by mouth daily      Cyanocobalamin (B-12) 2500 MCG SUBL Place 2,500 mg under the tongue daily      aspirin 81 MG tablet Take 81 mg by mouth daily      montelukast (SINGULAIR) 10 MG tablet TAKE 1 TABLET BY MOUTH DAILY FOR ALLERGIES 30 tablet 3    Lift Chair MISC by Does not apply route 1 each 0    Iron-Vitamins (GERITOL PO) Take 1 tablet by mouth daily.  multivitamin (ANTIOXIDANT;PROSIGHT) TABS per tablet Take 1 tablet by mouth daily. Current Meds  ciprofloxacin (CIPRO) IVPB 400 mg, Once  metronidazole (FLAGYL) 500 mg in NaCl 100 mL IVPB premix, Once  0.9 % sodium chloride bolus, Once  fluconazole (DIFLUCAN) in 0.9 % sodium chloride IVPB 400 mg, Q24H        Family History:   Family History   Problem Relation Age of Onset    Cancer Mother         uterus    Stroke Father     High Blood Pressure Father     Cancer Other         daughter - pancreatic       Social History:   TOBACCO:   reports that she has never smoked. She has never used smokeless tobacco.  ETOH:   reports current alcohol use. DRUGS:   reports no history of drug use. Review of Systems:   A 14 point review of systems was conducted, significant findings as noted in HPI. All other systems negative. Physical exam:    Vitals:    09/07/21 0730 09/07/21 0800 09/07/21 0830 09/07/21 0900   BP: (!) 150/67 135/71 138/69 (!) 161/73   Pulse: 102 116 102 105   Resp: 28 20 16 19   Temp:       TempSrc:       SpO2: 99% 97% 95% 97%       General appearance: alert, resting in bed  Neuro: A&Ox3, no focal deficits  HENT: trachea midline, no JVD, no LAD  Eyes: PERRLA, no scleral icterus  Chest/Lungs: normal respiratory effort; symmetric chest rise on RA  Cardiovascular: RRR, perfusing well  Abdomen: soft, moderately tender to palpation in RUQ and epigastric region, non-distended  Skin: warm and dry  Extremities: no edema , no cyanosis    Labs:    CBC:   Recent Labs     09/07/21 0700   WBC 5.5   HGB 9.6*   HCT 29.4*   MCV 80.9        BMP:   Recent Labs     09/07/21 0700      K 5.0      CO2 23   BUN 15   CREATININE 0.8     PT/INR:   Recent Labs     09/07/21 0700   PROTIME 21.8*   INR 1.88*     APTT: No results for input(s):  APTT in the last 72 hours. Liver Profile:  Lab Results   Component Value Date    AST 25 09/07/2021    ALT 10 09/07/2021    BILIDIR <0.2 09/01/2021    BILITOT <0.2 09/07/2021    ALKPHOS 68 09/07/2021     Lab Results   Component Value Date    CHOL 157 11/27/2020    HDL 56 11/27/2020    TRIG 130 11/27/2020     UA:   Lab Results   Component Value Date    NITRITE neg 07/31/2019    COLORU Yellow 09/01/2021    PHUR 6.0 09/01/2021    LABCAST 3-5 Hyaline 01/19/2015    WBCUA 6-10 07/23/2018    RBCUA 3-5 07/23/2018    MUCUS 1+ 01/19/2015    BACTERIA 1+ 07/23/2018    CLARITYU Clear 09/01/2021    SPECGRAV 1.010 09/01/2021    LEUKOCYTESUR Negative 09/01/2021    UROBILINOGEN 0.2 09/01/2021    BILIRUBINUR Negative 09/01/2021    BILIRUBINUR small 07/31/2019    BLOODU Negative 09/01/2021    GLUCOSEU Negative 09/01/2021    AMORPHOUS 2+ 07/23/2018       Imaging:   CT CHEST PULMONARY EMBOLISM W CONTRAST   Final Result      CHEST:   1. No evidence of pulmonary embolism or other acute cardiopulmonary abnormality. 2.  Bibasilar atelectasis. 3.  Pulmonary artery enlargement can be seen with pulmonary artery hypertension. ABDOMEN AND PELVIS:   1. Perforated duodenal bulb ulcer with free air and fluid. 2.  Calcified renal artery aneurysm. 3.  Indeterminate 1.1 cm low-density lesion in the uncinate process of the pancreas. This could be further evaluated with nonemergent MRI. Critical result was reported the Lady Karen MD 9/7/2021 9:59 AM.      CT ABDOMEN PELVIS W IV CONTRAST Additional Contrast? None   Final Result      CHEST:   1. No evidence of pulmonary embolism or other acute cardiopulmonary abnormality. 2.  Bibasilar atelectasis. 3.  Pulmonary artery enlargement can be seen with pulmonary artery hypertension. ABDOMEN AND PELVIS:   1. Perforated duodenal bulb ulcer with free air and fluid. 2.  Calcified renal artery aneurysm.    3.  Indeterminate 1.1 cm low-density lesion in the uncinate process of the pancreas. This could be further evaluated with nonemergent MRI. Critical result was reported the Kofi Castillo MD 9/7/2021 9:59 AM.             Assessment/Plan:  Nemo Otero is a 80 y.o. female with history of asthma, DM, HLD, HTN, GERD, and pemphigus foliaceous treated with Cellcept with RUQ abdominal pain that began today. Patient has been taking Xarelto since July due to DVT, with last dose of Xarelto being yesterday. In ED, patient is tachycardic but otherwise HDS and afebrile. Patient HgB is decreased at 9.6; however, this is stable from last week in ED at 9.5. Patient LFTs, troponin, and WBC are WNL. On physical exam, patient is moderately tender to palpation in RUQ and epigastric region but nonperitoneal, with CT AP showing perforated duodenal bulb ulcer with free air and fluid. Patient has a perforated duodenal ulcer and will require surgical management    - admit to surgical service  - preop for diagnostic laparoscopy, possible laparotomy, and possible Bree Miriam patch  - procedure discussed with family; all risks, benefits and alternatives discussed  - procedure has been fully reviewed with the patient and written informed consent has been obtained.   - NPO w/ IVF and Abx  - IV Protonix BID  - patient seen with senior resident and discussed with attending, Dr. Mason Landin,   PGY-1, General Surgery  09/07/21  10:30 AM  [phone]

## 2021-09-07 NOTE — ED TRIAGE NOTES
Pt to ED by EMS from home for RUQ abdominal pain that started this morning. Pt states she has been living with her daughter for the past couple of weeks.

## 2021-09-07 NOTE — BRIEF OP NOTE
Brief Postoperative Note      Patient: Mana Norris  YOB: 1930  MRN: [id number]    Date of Procedure: 9/7/2021    Pre-Op Diagnosis: FREE AIR    Post-Op Diagnosis: duodenal perforation       Procedure(s):  LAPAROSCOPY, SUTURE REPAIR OF DUODENAL ULCER, OMENTEM FLAP, EGD    Surgeon(s):  Jade Day MD    Assistant:  Resident: Yovanny Stevens MD; Juhi Coronado MD    Anesthesia: General    Estimated Blood Loss (mL): Minimal    Complications: None    Specimens:   * No specimens in log *    Implants:  * No implants in log *      Drains:   NG/OG/NJ/NE Tube Nasogastric Right nostril (Active)       Urethral Catheter Straight-tip 16 fr (Active)   Catheter Indications Perioperative use for selected surgical procedures 09/07/21 1220   Site Assessment Pink;Moist 09/07/21 1220   Urine Color Straw 09/07/21 1220   Urine Appearance Clear 09/07/21 1220   Urine Odor Malodorous 09/07/21 1220   Output (mL) 400 mL 09/07/21 1220       Findings: Duodenal perforation on the anterior aspect of D1 of duodenum; intraoperative EGD showing appropriate repair with negative leak test    Electronically signed by Yovanny Stevens MD on 9/7/2021 at 3:27 PM

## 2021-09-07 NOTE — ED PROVIDER NOTES
[address] [facility] [address] ENCOUNTER          ATTENDING PHYSICIAN NOTE       Date of evaluation: 9/7/2021    ADDENDUM:      Care of this patient was assumed from Dr. Greyson Malik. The patient was seen for Abdominal Pain (RUQ)  . The patient's initial evaluation and plan have been discussed with the prior provider who initially evaluated the patient. Nursing Notes, Past Medical Hx, Past Surgical Hx, Social Hx, Allergies, and Family Hx were all reviewed. Diagnostic Results         RADIOLOGY:  CT CHEST PULMONARY EMBOLISM W CONTRAST   Final Result      CHEST:   1. No evidence of pulmonary embolism or other acute cardiopulmonary abnormality. 2.  Bibasilar atelectasis. 3.  Pulmonary artery enlargement can be seen with pulmonary artery hypertension. ABDOMEN AND PELVIS:   1. Perforated duodenal bulb ulcer with free air and fluid. 2.  Calcified renal artery aneurysm. 3.  Indeterminate 1.1 cm low-density lesion in the uncinate process of the pancreas. This could be further evaluated with nonemergent MRI. Critical result was reported the Naomie Castillo MD 9/7/2021 9:59 AM.      CT ABDOMEN PELVIS W IV CONTRAST Additional Contrast? None   Final Result      CHEST:   1. No evidence of pulmonary embolism or other acute cardiopulmonary abnormality. 2.  Bibasilar atelectasis. 3.  Pulmonary artery enlargement can be seen with pulmonary artery hypertension. ABDOMEN AND PELVIS:   1. Perforated duodenal bulb ulcer with free air and fluid. 2.  Calcified renal artery aneurysm. 3.  Indeterminate 1.1 cm low-density lesion in the uncinate process of the pancreas. This could be further evaluated with nonemergent MRI.             Critical result was reported the Naomie Castillo MD 9/7/2021 9:59 AM.          LABS:   Results for orders placed or performed during the hospital encounter of 09/07/21   CBC Auto Differential   Result Value Ref Range    WBC 5.5 4.0 - 11.0 K/uL    RBC 3.64 (L) 4.00 - 5.20 M/uL    Hemoglobin 9.6 (L) 12.0 - 16.0 g/dL    Hematocrit 29.4 (L) 36.0 - 48.0 %    MCV 80.9 80.0 - 100.0 fL    MCH 26.3 26.0 - 34.0 pg    MCHC 32.5 31.0 - 36.0 g/dL    RDW 17.0 (H) 12.4 - 15.4 %    Platelets 660 084 - 059 K/uL    MPV 8.3 5.0 - 10.5 fL    Neutrophils % 57.5 %    Lymphocytes % 29.8 %    Monocytes % 8.1 %    Eosinophils % 3.6 %    Basophils % 1.0 %    Neutrophils Absolute 3.2 1.7 - 7.7 K/uL    Lymphocytes Absolute 1.7 1.0 - 5.1 K/uL    Monocytes Absolute 0.4 0.0 - 1.3 K/uL    Eosinophils Absolute 0.2 0.0 - 0.6 K/uL    Basophils Absolute 0.1 0.0 - 0.2 K/uL   Comprehensive Metabolic Panel w/ Reflex to MG   Result Value Ref Range    Sodium 140 136 - 145 mmol/L    Potassium reflex Magnesium 5.0 3.5 - 5.1 mmol/L    Chloride 104 99 - 110 mmol/L    CO2 23 21 - 32 mmol/L    Anion Gap 13 3 - 16    Glucose 104 (H) 70 - 99 mg/dL    BUN 15 7 - 20 mg/dL    CREATININE 0.8 0.6 - 1.2 mg/dL    GFR Non-African American >60 >60    GFR African American >60 >60    Calcium 9.3 8.3 - 10.6 mg/dL    Total Protein 6.4 6.4 - 8.2 g/dL    Albumin 3.4 3.4 - 5.0 g/dL    Albumin/Globulin Ratio 1.1 1.1 - 2.2    Total Bilirubin <0.2 0.0 - 1.0 mg/dL    Alkaline Phosphatase 68 40 - 129 U/L    ALT 10 10 - 40 U/L    AST 25 15 - 37 U/L    Globulin 3.0 g/dL   Lipase   Result Value Ref Range    Lipase 34.0 13.0 - 60.0 U/L   Troponin   Result Value Ref Range    Troponin <0.01 <0.01 ng/mL   Urinalysis, reflex to microscopic   Result Value Ref Range    Color, UA Yellow Straw/Yellow    Clarity, UA Clear Clear    Glucose, Ur Negative Negative mg/dL    Bilirubin Urine Negative Negative    Ketones, Urine TRACE (A) Negative mg/dL    Specific Gravity, UA 1.015 1.005 - 1.030    Blood, Urine Negative Negative    pH, UA 6.0 5.0 - 8.0    Protein, UA Negative Negative mg/dL    Urobilinogen, Urine 0.2 <2.0 E.U./dL    Nitrite, Urine Negative Negative    Leukocyte Esterase, Urine Negative Negative    Microscopic Examination Not Indicated Urine Type NotGiven    Lactate, Sepsis   Result Value Ref Range    Lactic Acid, Sepsis 1.4 0.4 - 1.9 mmol/L   Blood gas, venous (Lab)   Result Value Ref Range    pH, Carlos 7.400 7.350 - 7.450    pCO2, Carlos 44.5 41.0 - 51.0 mmHg    pO2, Carlos 31.9 25 - 40 mmHg    HCO3, Venous 27.5 24.0 - 28.0 mmol/L    Base Excess, Carlos 2.3 -2.0 - 3.0 mmol/L    O2 Sat, Carlos 55 Not established %    Carboxyhemoglobin 1.0 0.0 - 1.5 %    MetHgb, Carlos 0.1 0.0 - 1.5 %    TC02 (Calc), Carlos 29 mmol/L    Hemoglobin, Carlos, Reduced 44.80 %   PT - INR   Result Value Ref Range    Protime 21.8 (H) 9.9 - 12.7 sec    INR 1.88 (H) 0.88 - 1.12       RECENT VITALS:  BP: 136/69, Temp: 98.7 °F (37.1 °C), Pulse: 106, Resp: 20, SpO2: 96 %     Procedures         ED Course     The patient was given the following medications:  Orders Placed This Encounter   Medications    0.9 % sodium chloride bolus    morphine (PF) injection 2 mg    morphine injection 4 mg    iopamidol (ISOVUE-370) 76 % injection 80 mL    ciprofloxacin (CIPRO) IVPB 400 mg     Order Specific Question:   Antimicrobial Indications     Answer:   Intra-Abdominal Infection    metronidazole (FLAGYL) 500 mg in NaCl 100 mL IVPB premix     Order Specific Question:   Antimicrobial Indications     Answer:   Intra-Abdominal Infection    morphine injection 4 mg    DISCONTD: 0.9 % sodium chloride bolus    fluconazole (DIFLUCAN) in 0.9 % sodium chloride IVPB 400 mg    0.9 % sodium chloride infusion    pantoprazole (PROTONIX) injection 40 mg    0.9 % sodium chloride infusion       CONSULTS:  IP CONSULT TO GENERAL SURGERY    MEDICAL DECISION MAKING / ASSESSMENT / Lima Crissy is a 80 y.o. female presenting with abdominal pain. The patient was signed out to me awaiting labs and imaging. Was given additional doses of morphine 4mg IV for pain control. CBC with white count of 5.5 and hemoglobin of 9.6 which is similar to baseline. Renal panel with normal electrolytes and renal function.   FTs and lipase normal.  Troponin negative. Lactate negative. VBG normal.  CT chest abdomen and pelvis demonstrated perforation of the duodenum with abdominal free air. General surgery was consulted. The patient has an allergy to penicillin and so I ordered Cipro and Flagyl for gram-negative and anaerobic coverage. Plan to go to the OR with general surgery soon. Clinical Impression     1. Duodenal ulcer with perforation ([facility] [address]        Disposition     PATIENT REFERRED TO:  No follow-up provider specified.     DISCHARGE MEDICATIONS:  New Prescriptions    No medications on file       DISPOSITION Decision To Admit 09/07/2021 11:35:44 AM         Rose Marie Grajeda MD  09/07/21 1130

## 2021-09-07 NOTE — ANESTHESIA POSTPROCEDURE EVALUATION
Department of Anesthesiology  Postprocedure Note    Patient: Lucy Taylor  MRN: [id number]  [place]: 12/14/1930  Date of evaluation: 9/7/2021  Time:  4:00 PM     Procedure Summary     Date: 09/07/21 Room / Location: [address] [place] [address] Route [facility] 03 / [facility]    Anesthesia Start: 4762 Anesthesia Stop: 0528    Procedure: LAPAROSCOPY, SUTURE REPAIR OF DUODENAL ULCER, OMENTEM FLAP, EGD (N/A Abdomen) Diagnosis: (FREE AIR)    Surgeons: Skyler Way MD Responsible Provider: Kunal Liao MD    Anesthesia Type: general ASA Status: 3 - Emergent          Anesthesia Type: general    Jas Phase I: Jas Score: 7    Jas Phase II:      Last vitals: Reviewed and per EMR flowsheets.        Anesthesia Post Evaluation    Patient location during evaluation: PACU  Patient participation: complete - patient participated  Level of consciousness: awake and lethargic  Airway patency: patent  Nausea & Vomiting: no nausea and no vomiting  Complications: no  Cardiovascular status: hemodynamically stable and blood pressure returned to baseline  Respiratory status: spontaneous ventilation and nasal cannula  Hydration status: stable

## 2021-09-07 NOTE — ANESTHESIA PRE PROCEDURE
Department of Anesthesiology  Preprocedure Note       Name:  Marleni Zambrano   Age:  80 y.o.  :  1930                                          MRN:  [id number]         Date:  2021      Surgeon: Lima Jaime):  Mehnaz De La Rosa MD    Procedure: Procedure(s):  LAPAROSCOPY EXPLORATORY POSSIBLE LAPARTOMY    Medications prior to admission:   Prior to Admission medications    Medication Sig Start Date End Date Taking? Authorizing Provider   glimepiride (AMARYL) 2 MG tablet Take 1 tablet by mouth every morning 21   Camille Gray, MD   dilTIAZem [facility]) 180 MG extended release capsule Take 1 capsule by mouth daily 21   Camille Gray, MD   SITagliptin-metFORMIN (JANUMET XR)  MG TB24 per extended release tablet Take 1 tablet by mouth daily 21   Camille Gray, MD   omeprazole (PRILOSEC) 40 MG delayed release capsule Take 1 capsule by mouth daily 21   Camille Gray, MD   rivaroxaban [place]) 20 MG TABS tablet Take 1 tablet by mouth daily 21   Camille Gray MD   liothyronine (CYTOMEL) 25 MCG tablet Take 1 tablet by mouth daily 21   Camille Gray MD   atorvastatin (LIPITOR) 20 MG tablet Take 1 tablet by mouth nightly 21   Camille Gray MD   clobetasol (TEMOVATE) 0.05 % ointment Apply topically 2 times daily / PRN. 21   Camille Gray MD   Lancets ([address] [place]) [address] [facility] USE TWICE DAILY 21   Camille Gray MD   blood glucose test strips (ONE TOUCH ULTRA TEST) strip 1 each by In Vitro route 2 times daily As needed.  21   Camille Gray MD   BRERAMANDEEP ELLIPTA 200-25 MCG/INH AEPB inhaler INHALE 1 PUFF INTO THE LUNGS DAILY 3/8/21   Roxane Serrano MD   Blood Pressure Monitoring (BLOOD PRESS MONITOR/M-L CUFF) MISC 1 Device by Does not apply route daily 3/3/21   Camille Gray MD   albuterol sulfate HFA (PROVENTIL HFA) 108 (90 Base) MCG/ACT inhaler Inhale 2 puffs into the lungs every 4 hours as needed for Wheezing or Shortness of Breath 9/25/20   Trent Zapata MD   fluticasone (FLONASE) 50 MCG/ACT nasal spray SHAKE LIQUID AND USE 2 SPRAYS IN EACH NOSTRIL DAILY AS NEEDED FOR RHINITIS OR ALLERGIES 9/3/20   Audrey Mccrary MD   diclofenac sodium 1 % GEL Apply 4 g topically 4 times daily as needed for Pain 2/6/20   Audrey Mccrary MD   Blood Glucose Monitoring Suppl (ONE TOUCH ULTRA 2) w/Device KIT 1 kit by Does not apply route 2 times daily 2/6/20   Audrey Mccrary MD   cetirizine (ZYRTEC) 10 MG tablet Take 10 mg by mouth daily    Historical Provider, MD   Cyanocobalamin (B-12) 2500 MCG SUBL Place 2,500 mg under the tongue daily    Historical Provider, MD   aspirin 81 MG tablet Take 81 mg by mouth daily    Historical Provider, MD   montelukast (SINGULAIR) 10 MG tablet TAKE 1 TABLET BY MOUTH DAILY FOR ALLERGIES 10/31/17   Rodríguez Carroll MD   Lift Chair MISC by Does not apply route 3/7/16   Rodríguez Carroll MD   Iron-Vitamins (GERITOL PO) Take 1 tablet by mouth daily. Historical Provider, MD   multivitamin (ANTIOXIDANT;PROSIGHT) TABS per tablet Take 1 tablet by mouth daily.       Historical Provider, MD       Current medications:    Current Facility-Administered Medications   Medication Dose Route Frequency Provider Last Rate Last Admin    fluconazole (DIFLUCAN) in 0.9 % sodium chloride IVPB 400 mg  400 mg IntraVENous Q24H Mica Grande MD        0.9 % sodium chloride infusion   IntraVENous Continuous Mica Grande  mL/hr at 09/07/21 1144 Rate Verify at 09/07/21 1144    pantoprazole (PROTONIX) injection 40 mg  40 mg IntraVENous BID Taras Villanueva DO   40 mg at 09/07/21 1100    0.9 % sodium chloride infusion   IntraVENous PRN Tressa Jus, DO         Current Outpatient Medications   Medication Sig Dispense Refill    glimepiride (AMARYL) 2 MG tablet Take 1 tablet by mouth every morning 30 tablet 1    dilTIAZem (CARDIZEM CD) 180 MG extended release capsule Take 1 capsule by mouth daily 90 capsule 0    SITagliptin-metFORMIN (JANUMET XR)  MG TB24 per extended release tablet Take 1 tablet by mouth daily 90 tablet 0    omeprazole (PRILOSEC) 40 MG delayed release capsule Take 1 capsule by mouth daily 90 capsule 0    rivaroxaban (XARELTO) 20 MG TABS tablet Take 1 tablet by mouth daily 30 tablet 2    liothyronine (CYTOMEL) 25 MCG tablet Take 1 tablet by mouth daily 90 tablet 0    atorvastatin (LIPITOR) 20 MG tablet Take 1 tablet by mouth nightly 90 tablet 0    clobetasol (TEMOVATE) 0.05 % ointment Apply topically 2 times daily / PRN. 60 g 0    Lancets (ONETOUCH DELICA PLUS [id number]) MISC USE TWICE DAILY 100 each 3    blood glucose test strips (ONE TOUCH ULTRA TEST) strip 1 each by In Vitro route 2 times daily As needed. 100 each 3    BREO ELLIPTA 200-25 MCG/INH AEPB inhaler INHALE 1 PUFF INTO THE LUNGS DAILY 60 each 5    Blood Pressure Monitoring (BLOOD PRESS MONITOR/M-L CUFF) MISC 1 Device by Does not apply route daily 1 each 0    albuterol sulfate HFA (PROVENTIL HFA) 108 (90 Base) MCG/ACT inhaler Inhale 2 puffs into the lungs every 4 hours as needed for Wheezing or Shortness of Breath 1 Inhaler 5    fluticasone (FLONASE) 50 MCG/ACT nasal spray SHAKE LIQUID AND USE 2 SPRAYS IN EACH NOSTRIL DAILY AS NEEDED FOR RHINITIS OR ALLERGIES 1 Bottle 0    diclofenac sodium 1 % GEL Apply 4 g topically 4 times daily as needed for Pain 1 Tube 0    Blood Glucose Monitoring Suppl (ONE TOUCH ULTRA 2) w/Device KIT 1 kit by Does not apply route 2 times daily 1 kit 0    cetirizine (ZYRTEC) 10 MG tablet Take 10 mg by mouth daily      Cyanocobalamin (B-12) 2500 MCG SUBL Place 2,500 mg under the tongue daily      aspirin 81 MG tablet Take 81 mg by mouth daily      montelukast (SINGULAIR) 10 MG tablet TAKE 1 TABLET BY MOUTH DAILY FOR ALLERGIES 30 tablet 3    Lift Chair MISC by Does not apply route 1 each 0    Iron-Vitamins (GERITOL PO) Take 1 tablet by mouth daily.       multivitamin (ANTIOXIDANT;PROSIGHT) TABS per tablet Take 1 tablet by mouth daily. Allergies: Allergies   Allergen Reactions    Amlodipine Itching    Breo Ellipta [Fluticasone Furoate-Vilanterol]     Lisinopril Other (See Comments)     cough    Pcn [Penicillins] Swelling    Synthroid [Levothyroxine]      ITCHING       Problem List:    Patient Active Problem List   Diagnosis Code    Diabetes mellitus type II, controlled ([facility] [address] E11.9    HTN (hypertension) I10    Hyperlipidemia E78.5    Peptic ulcer K27.9    Seasonal allergies J30.2    Cervical radiculopathy at C5 bilaterally and left C7 M54.12    Moderate persistent asthma without complication Q05.06    Obesity (BMI 30-39. 9) E66.9    Other constipation K59.09    Absolute anemia D64.9    Other hyperlipidemia E78.49    Gastroesophageal reflux disease without esophagitis K21.9    Chronic constipation K59.09    Acquired hypothyroidism E03.9       Past Medical History:        Diagnosis Date    Arthritis     Asthma     Cervical radiculopathy at C5 bilaterally and left C7 2/3/2016    Diabetes mellitus ([facility] [address]     Hyperlipidemia     Hypertension        Past Surgical History:        Procedure Laterality Date    BUNIONECTOMY  11/11/11    EYE SURGERY      bilateral cataracts    FOOT SURGERY      left heel spurs    HAMMER TOE SURGERY  11/11/11    HYSTERECTOMY      JOINT REPLACEMENT      sriram knees    VAGINAL PROLAPSE REPAIR         Social History:    Social History     Tobacco Use    Smoking status: Never Smoker    Smokeless tobacco: Never Used   Substance Use Topics    Alcohol use: Yes     Comment: occasional                                Counseling given: Not Answered      Vital Signs (Current):   Vitals:    09/07/21 1229 09/07/21 1235 09/07/21 1244 09/07/21 1256   BP: 136/68 129/61 (!) 145/76 (!) 140/80   Pulse: 103 101 103 102   Resp: 20 22 20 20   Temp: 99.7 °F (37.6 °C)  99.1 °F (37.3 °C) 99 °F (37.2 °C)   TempSrc: Oral  Oral Oral   SpO2: 96% 96%  96%   Weight:       Height: BP Readings from Last 3 Encounters:   09/07/21 (!) 140/80   09/01/21 (!) 140/71   07/19/21 120/68       NPO Status:                                                                                 BMI:   Wt Readings from Last 3 Encounters:   09/07/21 170 lb (77.1 kg)   08/31/21 170 lb (77.1 kg)   07/19/21 160 lb 12.8 oz (72.9 kg)     Body mass index is 29.18 kg/m². CBC:   Lab Results   Component Value Date    WBC 5.5 09/07/2021    RBC 3.64 09/07/2021    HGB 9.6 09/07/2021    HCT 29.4 09/07/2021    MCV 80.9 09/07/2021    RDW 17.0 09/07/2021     09/07/2021       CMP:   Lab Results   Component Value Date     09/07/2021    K 5.0 09/07/2021     09/07/2021    CO2 23 09/07/2021    BUN 15 09/07/2021    CREATININE 0.8 09/07/2021    GFRAA >60 09/07/2021    GFRAA >60 11/11/2011    AGRATIO 1.1 09/07/2021    LABGLOM >60 09/07/2021    GLUCOSE 104 09/07/2021    PROT 6.4 09/07/2021    CALCIUM 9.3 09/07/2021    BILITOT <0.2 09/07/2021    ALKPHOS 68 09/07/2021    AST 25 09/07/2021    ALT 10 09/07/2021       POC Tests: No results for input(s): POCGLU, POCNA, POCK, POCCL, POCBUN, POCHEMO, POCHCT in the last 72 hours.     Coags:   Lab Results   Component Value Date    PROTIME 21.8 09/07/2021    INR 1.88 09/07/2021    APTT 25.6 07/14/2014       HCG (If Applicable): No results found for: PREGTESTUR, PREGSERUM, HCG, HCGQUANT     ABGs: No results found for: PHART, PO2ART, QTX6KVQ, SHJ6LOT, BEART, N2XTMLXV     Type & Screen (If Applicable):  No results found for: LABABO, LABRH    Drug/Infectious Status (If Applicable):  No results found for: HIV, HEPCAB    COVID-19 Screening (If Applicable): No results found for: COVID19        Anesthesia Evaluation  Patient summary reviewed and Nursing notes reviewed  Airway: Mallampati: II     Neck ROM: full   Dental:          Pulmonary:normal exam    (+) asthma:                            Cardiovascular:    (+) hypertension:, hyperlipidemia            Echocardiogram

## 2021-09-07 NOTE — ED NOTES
Bed: B25-25  Expected date:   Expected time:   Means of arrival:   Comments:  Chela Troy RN  09/07/21 6079

## 2021-09-07 NOTE — PROGRESS NOTES
4 Eyes Admission Assessment     I agree as the admission nurse that 2 RN's have performed a thorough Head to Toe Skin Assessment on the patient. ALL assessment sites listed below have been assessed on admission. 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        [address]  nurse consulted for Pressure Injury (Stage 3,4, Unstageable, DTI, NWPT, and Complex wounds) or Matheus score 18 or lower:   pemphigus foliaceous     Nurse 1 eSignature: Electronically signed by Layo Reynoso RN on 9/7/21 at 5:42 PM EDT    **SHARE this note so that the co-signing nurse is able to place an eSignature**    Nurse 2 eSignature: {Esignature:[id number]}

## 2021-09-07 NOTE — PROGRESS NOTES
PACU Transfer to Floor Note    Current Allergies: Amlodipine, Breo ellipta [fluticasone furoate-vilanterol], Lisinopril, Pcn [penicillins], and Synthroid [levothyroxine]    Pt meets criteria as per George Score and ASPAN Standards to transfer to next phase of care. Recent Labs     09/07/21  1534   POCGLU 193*       Vitals:    09/07/21 1700   BP: 138/66   Pulse: 103   Resp: 14   Temp: 99 °F (37 °C)   SpO2: 99%     Vitals within 20% of pt's admission vitals as per GEORGE SCORE    SpO2: 98 %    O2 Flow Rate (L/min): 2 L/min      Intake/Output Summary (Last 24 hours) at 9/7/2021 1717  Last data filed at 9/7/2021 1700  Gross per 24 hour   Intake 1725.89 ml   Output 1000 ml   Net 725.89 ml       Pain assessment:  none    Pain Level: 0    Patient was assessed for alterations to skin integrity. There were not alterations observed. Is patient incontinent: no    Handoff report given at bedside.    Family updated and directed to pt room      9/7/2021 5:17 PM

## 2021-09-07 NOTE — ED PROVIDER NOTES
[address] [facility]          ATTENDING PHYSICIAN NOTE       Date of evaluation: 9/7/2021    Chief Complaint     Abdominal Pain (RUQ)      History of Present Illness     Claude Teague is a 80 y.o. female who presents complaint of right upper quadrant pain that woke her from sleep. The patient states that the pain is constant, 8 out of 10, and starts just underneath her right breast, radiates across her abdomen. It does not radiate to her chest.  There is no associated dyspnea or shortness of breath. She denies fevers or chills. She denies any diarrhea. She denies any history of similar pain, or abdominal surgery. She has not tried to eat or found anything that alleviates or worsens the pain. No history of similar episodes. Review of Systems     Review of Systems   Constitutional: Negative for chills, fatigue and fever. Respiratory: Negative for cough. Cardiovascular: Negative for chest pain and palpitations. Gastrointestinal: Positive for abdominal pain. Negative for anal bleeding, diarrhea, nausea and vomiting. Genitourinary: Negative for dyspareunia, dysuria and flank pain. Musculoskeletal: Negative for back pain. Neurological: Negative for dizziness. Hematological: Bruises/bleeds easily. All other systems reviewed and are negative. Past Medical, Surgical, Family, and Social History     She has a past medical history of Arthritis, Asthma, Cervical radiculopathy at C5 bilaterally and left C7, Diabetes mellitus ([address] Hyperlipidemia, and Hypertension. She has a past surgical history that includes Vaginal prolapse repair; joint replacement; Foot surgery; Hysterectomy; eye surgery; Bunionectomy (11/11/11); and Hammer toe surgery (11/11/11). Her family history includes Cancer in her mother and another family member; High Blood Pressure in her father; Stroke in her father. She reports that she has never smoked.  She has never used smokeless tobacco. She reports current alcohol use. She reports that she does not use drugs. Medications     Previous Medications    ALBUTEROL SULFATE HFA (PROVENTIL HFA) 108 (90 BASE) MCG/ACT INHALER    Inhale 2 puffs into the lungs every 4 hours as needed for Wheezing or Shortness of Breath    ASPIRIN 81 MG TABLET    Take 81 mg by mouth daily    ATORVASTATIN (LIPITOR) 20 MG TABLET    Take 1 tablet by mouth nightly    BLOOD GLUCOSE MONITORING SUPPL (ONE TOUCH ULTRA 2) W/DEVICE KIT    1 kit by Does not apply route 2 times daily    BLOOD GLUCOSE TEST STRIPS (ONE TOUCH ULTRA TEST) STRIP    1 each by In Vitro route 2 times daily As needed. BLOOD PRESSURE MONITORING (BLOOD PRESS MONITOR/M-L CUFF) MISC    1 Device by Does not apply route daily    BREO ELLIPTA 200-25 MCG/INH AEPB INHALER    INHALE 1 PUFF INTO THE LUNGS DAILY    CETIRIZINE (ZYRTEC) 10 MG TABLET    Take 10 mg by mouth daily    CLOBETASOL (TEMOVATE) 0.05 % OINTMENT    Apply topically 2 times daily / PRN. CYANOCOBALAMIN (B-12) 2500 MCG SUBL    Place 2,500 mg under the tongue daily    DICLOFENAC SODIUM 1 % GEL    Apply 4 g topically 4 times daily as needed for Pain    DILTIAZEM (CARDIZEM CD) 180 MG EXTENDED RELEASE CAPSULE    Take 1 capsule by mouth daily    FLUTICASONE (FLONASE) 50 MCG/ACT NASAL SPRAY    SHAKE LIQUID AND USE 2 SPRAYS IN EACH NOSTRIL DAILY AS NEEDED FOR RHINITIS OR ALLERGIES    GLIMEPIRIDE (AMARYL) 2 MG TABLET    Take 1 tablet by mouth every morning    IRON-VITAMINS (GERITOL PO)    Take 1 tablet by mouth daily. LANCETS (ONETOUCH DELICA PLUS [id number]) MISC    USE TWICE DAILY    LIFT CHAIR MISC    by Does not apply route    LIOTHYRONINE (CYTOMEL) 25 MCG TABLET    Take 1 tablet by mouth daily    MONTELUKAST (SINGULAIR) 10 MG TABLET    TAKE 1 TABLET BY MOUTH DAILY FOR ALLERGIES    MULTIVITAMIN (ANTIOXIDANT;PROSIGHT) TABS PER TABLET    Take 1 tablet by mouth daily.       OMEPRAZOLE (PRILOSEC) 40 MG DELAYED RELEASE CAPSULE    Take 1 capsule by mouth daily RIVAROXABAN (XARELTO) 20 MG TABS TABLET    Take 1 tablet by mouth daily    SITAGLIPTIN-METFORMIN (JANUMET XR)  MG TB24 PER EXTENDED RELEASE TABLET    Take 1 tablet by mouth daily       Allergies     She is allergic to amlodipine, breo ellipta [fluticasone furoate-vilanterol], lisinopril, pcn [penicillins], and synthroid [levothyroxine]. Physical Exam     INITIAL VITALS: BP: (!) 151/72, Temp: 98.7 °F (37.1 °C), Pulse: 87, Resp: 18, SpO2: 97 %   Physical Exam  Constitutional: Well nourished pleasant elderly female who appears in no acute distress. HEENT: NC/AT. EOMI. CV:Heart is regular rate and rhythm without murmurs, rubs or gallops. Resp: Respirations unlabored. Lungs clear to auscultation w/o wheezing. Chest wall is nontender to palpation. Abd: Abdomen is soft without peritonitis, but she is tender to palpation in the right upper quadrant, with some referred tenderness in the left upper quadrant. There is no rebound or guarding. There is no flank tenderness bilaterally. MSK: Full ROM, no edema or tenderness to palpation. Skin: Extremities are warm and well perfused. 2+ radial pulses . Cap Refill <3 seconds. Neuro: A&Ox3. Cranial nerves grossly intact   Strength and sensation intact x4 extremities. Psych: Thought content, behavior & judgement normal.    Diagnostic Results     EKG   EKG shows sinus rhythm with a rate of 88, , QRS of 78 and QTc of 48. No acute ST segment elevations or depressions are noted. T wave inversions in lead III and aVR are normal variants. Baseline wandering is noted no clear STEMI. RADIOLOGY:  No orders to display       LABS:   No results found for this visit on 09/07/21. RECENT VITALS:  BP: (!) 151/72,Temp: 98.7 °F (37.1 °C), Pulse: 87, Resp: 18, SpO2: 97 %     Procedures         ED Course     Nursing Notes, Past Medical Hx, Past Surgical Hx, Social Hx,Allergies, and Family Hx were reviewed.     patient was given the following medications:  Orders Placed This Encounter   Medications    0.9 % sodium chloride bolus    morphine (PF) injection 2 mg       CONSULTS:  None    MEDICAL DECISIONMAKING / ASSESSMENT / Lauri Amilcar is a 80 y.o. female resenting with acute right upper quadrant abdominal pain that woke her from sleep. Examination she has upper abdominal tenderness concerning for perforated viscus, cholecystitis. Although there is some radiation of the chest, with the symptoms starting in the right side of the chest I have a lower suspicion for ACS. Her EKG shows no signs of STEMI. She is on therapeutic anticoagulation with stable vitals and so I have lower concern for pulmonary embolus. Aortic dissection could be considered in the differential however the lateral nature of the pain is inconsistent with that diagnosis. Labs were sent, but are not resulted yet, and my oncoming colleague, Dr. Ana Plaza will follow up on them. Will also need cross-sectional imaging, but will wait on labs before deciding on a strategy for her. No signs of overt sepsis septic shock, no fevers or tachycardia, antibiotics were held at this point. .    Clinical Impression     No diagnosis found. Disposition     PATIENT REFERRED TO:  No follow-up provider specified.     DISCHARGE MEDICATIONS:  New Prescriptions    No medications on file       DISPOSITION    Pending at [address] [facility] [address], [place]  09/07/21 5513

## 2021-09-08 LAB
ALBUMIN SERPL-MCNC: 2.4 G/DL (ref 3.4–5)
ANION GAP SERPL CALCULATED.3IONS-SCNC: 8 MMOL/L (ref 3–16)
BASOPHILS ABSOLUTE: 0 K/UL (ref 0–0.2)
BASOPHILS RELATIVE PERCENT: 0.4 %
BUN BLDV-MCNC: 14 MG/DL (ref 7–20)
CALCIUM SERPL-MCNC: 7.9 MG/DL (ref 8.3–10.6)
CHLORIDE BLD-SCNC: 107 MMOL/L (ref 99–110)
CO2: 22 MMOL/L (ref 21–32)
CREAT SERPL-MCNC: 0.7 MG/DL (ref 0.6–1.2)
EOSINOPHILS ABSOLUTE: 0.1 K/UL (ref 0–0.6)
EOSINOPHILS RELATIVE PERCENT: 1 %
GFR AFRICAN AMERICAN: >60
GFR NON-AFRICAN AMERICAN: >60
GLUCOSE BLD-MCNC: 101 MG/DL (ref 70–99)
GLUCOSE BLD-MCNC: 104 MG/DL (ref 70–99)
GLUCOSE BLD-MCNC: 106 MG/DL (ref 70–99)
GLUCOSE BLD-MCNC: 114 MG/DL (ref 70–99)
GLUCOSE BLD-MCNC: 83 MG/DL (ref 70–99)
GLUCOSE BLD-MCNC: 95 MG/DL (ref 70–99)
GLUCOSE BLD-MCNC: 99 MG/DL (ref 70–99)
HCT VFR BLD CALC: 23.1 % (ref 36–48)
HEMOGLOBIN: 7.5 G/DL (ref 12–16)
LYMPHOCYTES ABSOLUTE: 0.8 K/UL (ref 1–5.1)
LYMPHOCYTES RELATIVE PERCENT: 11 %
MAGNESIUM: 1.4 MG/DL (ref 1.8–2.4)
MCH RBC QN AUTO: 26.2 PG (ref 26–34)
MCHC RBC AUTO-ENTMCNC: 32.4 G/DL (ref 31–36)
MCV RBC AUTO: 81 FL (ref 80–100)
MONOCYTES ABSOLUTE: 0.5 K/UL (ref 0–1.3)
MONOCYTES RELATIVE PERCENT: 7.3 %
NEUTROPHILS ABSOLUTE: 5.8 K/UL (ref 1.7–7.7)
NEUTROPHILS RELATIVE PERCENT: 80.3 %
PDW BLD-RTO: 16.5 % (ref 12.4–15.4)
PERFORMED ON: ABNORMAL
PERFORMED ON: NORMAL
PHOSPHORUS: 3.4 MG/DL (ref 2.5–4.9)
PLATELET # BLD: 235 K/UL (ref 135–450)
PMV BLD AUTO: 7.9 FL (ref 5–10.5)
POTASSIUM SERPL-SCNC: 4.1 MMOL/L (ref 3.5–5.1)
RBC # BLD: 2.85 M/UL (ref 4–5.2)
SODIUM BLD-SCNC: 137 MMOL/L (ref 136–145)
WBC # BLD: 7.2 K/UL (ref 4–11)

## 2021-09-08 PROCEDURE — 36415 COLL VENOUS BLD VENIPUNCTURE: CPT

## 2021-09-08 PROCEDURE — 2500000003 HC RX 250 WO HCPCS: Performed by: STUDENT IN AN ORGANIZED HEALTH CARE EDUCATION/TRAINING PROGRAM

## 2021-09-08 PROCEDURE — 2580000003 HC RX 258: Performed by: STUDENT IN AN ORGANIZED HEALTH CARE EDUCATION/TRAINING PROGRAM

## 2021-09-08 PROCEDURE — 97166 OT EVAL MOD COMPLEX 45 MIN: CPT

## 2021-09-08 PROCEDURE — 6360000002 HC RX W HCPCS: Performed by: STUDENT IN AN ORGANIZED HEALTH CARE EDUCATION/TRAINING PROGRAM

## 2021-09-08 PROCEDURE — 85025 COMPLETE CBC W/AUTO DIFF WBC: CPT

## 2021-09-08 PROCEDURE — C9113 INJ PANTOPRAZOLE SODIUM, VIA: HCPCS | Performed by: STUDENT IN AN ORGANIZED HEALTH CARE EDUCATION/TRAINING PROGRAM

## 2021-09-08 PROCEDURE — 99024 POSTOP FOLLOW-UP VISIT: CPT | Performed by: SURGERY

## 2021-09-08 PROCEDURE — 97530 THERAPEUTIC ACTIVITIES: CPT

## 2021-09-08 PROCEDURE — 80069 RENAL FUNCTION PANEL: CPT

## 2021-09-08 PROCEDURE — 83735 ASSAY OF MAGNESIUM: CPT

## 2021-09-08 PROCEDURE — 1200000000 HC SEMI PRIVATE

## 2021-09-08 PROCEDURE — 0DJ08ZZ INSPECTION OF UPPER INTESTINAL TRACT, VIA NATURAL OR ARTIFICIAL OPENING ENDOSCOPIC: ICD-10-PCS | Performed by: SURGERY

## 2021-09-08 PROCEDURE — 0DU947Z SUPPLEMENT DUODENUM WITH AUTOLOGOUS TISSUE SUBSTITUTE, PERCUTANEOUS ENDOSCOPIC APPROACH: ICD-10-PCS | Performed by: SURGERY

## 2021-09-08 RX ORDER — CIPROFLOXACIN 2 MG/ML
400 INJECTION, SOLUTION INTRAVENOUS EVERY 12 HOURS
Status: COMPLETED | OUTPATIENT
Start: 2021-09-08 | End: 2021-09-11

## 2021-09-08 RX ORDER — MAGNESIUM SULFATE IN WATER 40 MG/ML
2000 INJECTION, SOLUTION INTRAVENOUS ONCE
Status: COMPLETED | OUTPATIENT
Start: 2021-09-08 | End: 2021-09-08

## 2021-09-08 RX ADMIN — PANTOPRAZOLE SODIUM 40 MG: 40 INJECTION, POWDER, FOR SOLUTION INTRAVENOUS at 22:05

## 2021-09-08 RX ADMIN — MAGNESIUM SULFATE HEPTAHYDRATE 2000 MG: 2 INJECTION, SOLUTION INTRAVENOUS at 11:06

## 2021-09-08 RX ADMIN — SODIUM CHLORIDE: 9 INJECTION, SOLUTION INTRAVENOUS at 16:06

## 2021-09-08 RX ADMIN — METHOCARBAMOL 500 MG: 100 INJECTION, SOLUTION INTRAMUSCULAR; INTRAVENOUS at 00:00

## 2021-09-08 RX ADMIN — METRONIDAZOLE 500 MG: 500 INJECTION, SOLUTION INTRAVENOUS at 22:09

## 2021-09-08 RX ADMIN — METRONIDAZOLE 500 MG: 500 INJECTION, SOLUTION INTRAVENOUS at 14:53

## 2021-09-08 RX ADMIN — METRONIDAZOLE 500 MG: 500 INJECTION, SOLUTION INTRAVENOUS at 07:17

## 2021-09-08 RX ADMIN — CIPROFLOXACIN 400 MG: 2 INJECTION, SOLUTION INTRAVENOUS at 18:46

## 2021-09-08 RX ADMIN — CIPROFLOXACIN 400 MG: 2 INJECTION, SOLUTION INTRAVENOUS at 06:17

## 2021-09-08 RX ADMIN — ENOXAPARIN SODIUM 40 MG: 40 INJECTION SUBCUTANEOUS at 11:07

## 2021-09-08 RX ADMIN — METHOCARBAMOL 500 MG: 100 INJECTION, SOLUTION INTRAMUSCULAR; INTRAVENOUS at 10:13

## 2021-09-08 RX ADMIN — FLUCONAZOLE 400 MG: 2 INJECTION, SOLUTION INTRAVENOUS at 10:57

## 2021-09-08 RX ADMIN — PANTOPRAZOLE SODIUM 40 MG: 40 INJECTION, POWDER, FOR SOLUTION INTRAVENOUS at 10:07

## 2021-09-08 ASSESSMENT — PAIN SCALES - GENERAL
PAINLEVEL_OUTOF10: 0

## 2021-09-08 ASSESSMENT — PAIN SCALES - WONG BAKER
WONGBAKER_NUMERICALRESPONSE: 0

## 2021-09-08 NOTE — PROGRESS NOTES
ICU SURGERY DAILY PROGRESS NOTE    CC: duodenal ulcer with perforation     SUBJECTIVE:   Interval Hx: NAEON. Denies nausea, vomiting. Denies abdominal pain. AF HDS.     ROS: A 14 point review of systems was conducted, significant findings as noted above. All other systems negative. OBJECTIVE:   Vitals:   Vitals:    09/08/21 0300 09/08/21 0400 09/08/21 0500 09/08/21 0600   BP: (!) 116/51 (!) 111/56 122/60 116/64   Pulse: 98 98 99 96   Resp: 19 17 14 16   Temp:  99.5 °F (37.5 °C)     TempSrc:  Oral     SpO2: 99% 96% 97%    Weight:       Height:           I/O:     Intake/Output Summary (Last 24 hours) at 9/8/2021 0640  Last data filed at 9/8/2021 0529  Gross per 24 hour   Intake 3556.79 ml   Output 1860 ml   Net 1696.79 ml     I/O last 3 completed shifts: In: 1725.9 [I.V.:885.2; Blood:443.8; IV Piggyback:396.9]  Out: 1410 [Urine:1185; Drains:225]    Diet: Diet NPO      Physical Examination:   General appearance: alert, no acute distress, grooming appropriate  Eyes: No scleral icterus, EOM grossly intact  Neck: trachea midline, no JVD, no lymphadenopathy, neck supple  Chest/Lungs: normal effort with no accessory muscle use on 2L NC  Cardiovascular: RRR, well perfused  Abdomen: Soft, appropriately-tender, incisions c/d/i and well approximated with Dermabond; MARIBELL drain x1 with serosanguinous contents; NG tube in place     Skin: warm and dry, no rashes  Extremities: no edema, no cyanosis  Genitourinary: Anguiano in place with clear yellow urine  Neuro: A&Ox3, no focal deficits, sensation intact    Labs:  CBC:   Recent Labs     09/07/21  0700   WBC 5.5   HGB 9.6*   HCT 29.4*          BMP:   Recent Labs     09/07/21  0700      K 5.0      CO2 23   BUN 15   CREATININE 0.8   GLUCOSE 104*     LFT's:   Recent Labs     09/07/21  0700   AST 25   ALT 10   BILITOT <0.2   ALKPHOS 68     Troponin:   Recent Labs     09/07/21  0700   TROPONINI <0.01     BNP: No results for input(s): BNP in the last 72 hours.   ABGs: No results for input(s): PHART, FUJ1IFS, PO2ART in the last 72 hours. INR:   Recent Labs     09/07/21  0700   INR 1.88*       U/A:  Recent Labs     09/07/21  1044   COLORU Yellow   PHUR 6.0   CLARITYU Clear   SPECGRAV 1.015   LEUKOCYTESUR Negative   UROBILINOGEN 0.2   BILIRUBINUR Negative   BLOODU Negative   GLUCOSEU Negative        Rad:   CT CHEST PULMONARY EMBOLISM W CONTRAST   Final Result      CHEST:   1. No evidence of pulmonary embolism or other acute cardiopulmonary abnormality. 2.  Bibasilar atelectasis. 3.  Pulmonary artery enlargement can be seen with pulmonary artery hypertension. ABDOMEN AND PELVIS:   1. Perforated duodenal bulb ulcer with free air and fluid. 2.  Calcified renal artery aneurysm. 3.  Indeterminate 1.1 cm low-density lesion in the uncinate process of the pancreas. This could be further evaluated with nonemergent MRI. Critical result was reported the Naomie Castillo MD 9/7/2021 9:59 AM.      CT ABDOMEN PELVIS W IV CONTRAST Additional Contrast? None   Final Result      CHEST:   1. No evidence of pulmonary embolism or other acute cardiopulmonary abnormality. 2.  Bibasilar atelectasis. 3.  Pulmonary artery enlargement can be seen with pulmonary artery hypertension. ABDOMEN AND PELVIS:   1. Perforated duodenal bulb ulcer with free air and fluid. 2.  Calcified renal artery aneurysm. 3.  Indeterminate 1.1 cm low-density lesion in the uncinate process of the pancreas. This could be further evaluated with nonemergent MRI. Critical result was reported the Naomie Castillo MD 9/7/2021 9:59 AM.          ASSESSMENT AND PLAN:   Alo Wu is a 80 y.o. female status post omental patch secondary to duodenal perf. POD1.   Neuro:   -Pain: Robaxin scheduled; morphine PRN     Cardiovascular:   -hemodynamically stable, will continue to monitor     #HTN  -hydralazine 5 mg prn     Pulmonary:   -IS ordered to bedside, encourage hourly IS and deep breathing  -supplemental O2 for sats>90%, wean as tolerated  -no external positive pressure support     GI:   -NPO  -NGT LCWS, prn flushes, sump port open to air   -PPI     FEN:   -mIVF:NS @ 125     /Renal:   -mIVF:NS @ 125  -continue clifford   -strict I/O's     Hem/ID:   -monitor for signs of bleeding   -transfuse for Hb<7     #h/o DVT  - xarelto held      #perforated duodenal ulcer  -Abx:diflucan/flagyl/cipro  -continue to trend leukocytosis curve     Endo:   #DM  -low dose SSI   -Q6H accuhecks while NPO  -maintain -180     Prophylaxis:   - SCDs  - chemical dvt ppx held     Access:  Peripheral Access: PIV x2 (9/7)                                Clifford Date placed: 9/7  NGT Date placed: 9/7     Mobility:  -pending PT/OT     Dispo: step down    -----------------------------  Manuel Sheehan MD  09/08/21  6:40 AM

## 2021-09-08 NOTE — PROGRESS NOTES
Colorectal Surgery  Post-operative Note      Procedure(s) Performed: laparoscopy, suture repair of duodenal ulcer, omentum flap    Subjective:   Patient's pain is controlled, denies nausea or vomiting. She has not yet eaten or ambulated. UOP adequate via clifford. Denies flatus or BM at this time. Objective:  Anesthesia type: General      I/O    Intra op    Post op     Fluids  800 mL 0 mL     EBL 0 mL 0mL     Urine 0mL 635mL     Vitals:   Vitals:    09/07/21 1800 09/07/21 1815 09/07/21 1830 09/07/21 1845   BP: (!) 117/57 (!) 111/55 (!) 122/59 (!) 119/59   Pulse: 96 95 98 97   Resp: 12 12 12 14   Temp:       TempSrc:       SpO2: 96% 97% 96% 97%   Weight:       Height:           Physical Exam:  Post-op vital signs:  Stable   General appearance: alert, no acute distress, grooming appropriate  Eyes: No scleral icterus, EOM grossly intact  Neck: trachea midline, no JVD, no lymphadenopathy, neck supple  Chest/Lungs: normal effort with no accessory muscle use on 2L NC  Cardiovascular: RRR, well perfused  Abdomen: Soft, appropriately-tender, incisions c/d/i and well approximated with Dermabond; MARIBELL drain x1 with serosanguinous contents; NG tube in place     Skin: warm and dry, no rashes  Extremities: no edema, no cyanosis  Genitourinary: Clifford in place with clear yellow urine  Neuro: A&Ox3, no focal deficits, sensation intact    Assessment and Plan  This is a 80y.o. year old female status post omental patch secondary to duodenal perf. POD0.     Neuro:   -Pain: Robaxin scheduled; morphine PRN    Cardiovascular:   -hemodynamically stable, will continue to monitor    #HTN  -hydralazine 5 mg prn    Pulmonary:   -IS ordered to bedside, encourage hourly IS and deep breathing  -supplemental O2 for sats>90%, wean as tolerated  -no external positive pressure support    GI:   -NPO  -NGT LCWS, prn flushes, sump port open to air   -PPI    FEN:   -mIVF:NS @ 125    /Renal:   -mIVF:NS @ 125  -continue clifford   -strict I/O's    Hem/ID: -monitor for signs of bleeding   -transfuse for Hb<7    #h/o DVT  - xarelto held     #perforated duodenal ulcer  -Abx:diflucan/flagyl/cipro  -continue to trend leukocytosis curve    Endo:   #DM  -low dose SSI   -Q6H accuhecks while NPO  -maintain -180    Prophylaxis:   - SCDs  - chemical dvt ppx held    Access:  Peripheral Access: PIV x2 (9/7)                                Anguiano Date placed: 9/7  NGT Date placed: 9/7    Mobility:  -pending PT/OT    Dispo:   ICU    Code Status: Prior  -----------------------------  Crystal Kwan MD  09/07/21  9:10 PM  [phone]

## 2021-09-08 NOTE — OP NOTE
[facility] [address], [address]                                OPERATIVE REPORT    PATIENT NAME: Heather Taylor                  :        1930  MED REC NO:   [id number]                          ROOM:       4523  ACCOUNT NO:   [de-identified]                           ADMIT DATE: 2021  PROVIDER:     Vickie Arellano MD    DATE OF PROCEDURE:  2021    SURGEON:  Sugar Jose MD    ASSISTANT:  Marion Ceballos, PGY-5, resident. PREOPERATIVE DIAGNOSIS:  Free air from perforated duodenal ulcer. POSTOPERATIVE DIAGNOSIS:  Free air from perforated duodenal ulcer. PROCEDURES PERFORMED:  1. Laparoscopic suture repair of duodenal perforation. 2.  Creation of greater omental pedicle vascularized flap. 3.  Esophagogastroduodenoscopy, diagnostic. ANESTHESIA:  General endotracheal.    COMPLICATIONS:  None. SPECIMENS:  None. FINDINGS:  A 7-mm hole in the first portion of the duodenum. EGD  reveals no evidence of malignancy. ESTIMATED BLOOD LOSS:  Minimal.    INDICATIONS:  The patient is a 55-year-old female. Of note, she is on  multiple blood thinners including aspirin and Xarelto. She was recently  also placed on CellCept for a skin lesion, for approximately two months. She came in with acute onset of abdominal pain. She denies previous  abdominal surgeries other than a hysterectomy. She was found to have  diffuse tenderness and on CT scan was shown to have a diffuse amount of  free air with a perforation likely in the upper abdomen. Discussed the  plan for diagnostic laparoscopy and laparoscopic versus open suture  repair of the duodenal ulcer and omental patch with omental flap.    Discussed the risks of surgery including but not limited to bleeding,  infection, suture breakdown, reperforation, need for additional  operations, damage to intraabdominal structures such as liver,  gallbladder, duodenum, common bile duct, pancreas. The daughter was  also there, and they also understood she is at high risk for bleeding  given her active anticoagulation as well as her aspirin use, and she is  at high risk for complications given her age and all the above. They  understood the possibility of finding another source of the free air  which could necessitate bowel resection or ostomy. They agreed to  proceed. PROCEDURE DETAILS:  The patient was brought to the operating theater,  placed supine on the operating table. General endotracheal intubation  was performed by Anesthesiology. Placed the patient in supine position. Her arms were on arm boards. Her abdomen was prepped and draped in the  usual sterile fashion using Betadine prep solution. Timeout was  performed confirming the patient's identity as well as the operative  site. Antibiotics were confirmed to be perfusing. All safety points  were followed. SCDs were on and functioning. Lovenox was confirmed  given. Began by making an incision in the left upper quadrant midclavicular  line. A 0-degree 5 mm laparoscope was used to enter the abdominal  cavity in direct Optiview fashion. The abdomen was entered without  complication, insufflated to 15 mmHg. I then placed additional 5 mm  port in the supraumbilical region and in the left upper quadrant. We  noted inflammatory changes of the upper abdomen near the first portion  of the duodenum. Suction irrigation was then used to take down some of  these inflammatory adhesions, and we noted a perforation in the first  portion of the duodenum. The 5-mm supraumbilical port was then upgraded  to a 12 mm port and an additional 5 mm port was placed in the left  lateral abdomen. The patient was then placed in head-up positioning. We began by placing the laparoscopic suture repair of the perforation of  the duodenum. 2-0 silks x2 were then used in interrupted fashion to  close the hole.   It came together nicely without tension. We then  created greater omental pedicle vascularized flap to bolster the repair. The LigaSure device was then used to take down some adhesions around the  umbilicus and the omentum was then  off the portion of the  transverse colon, and the LigaSure was then used to create a tongue of  omentum that would be off the right omental artery, noted to bolster the  suture repair. After this tongue was created, additional 2-0 silk  suture x2 was laparoscopically placed in order to secure the omentum in  place. The EGD was then advanced transorally and the NG tube was then removed. We advanced an EGD scope through the esophagus to the stomach, continued  through the pylorus. The diagnostic EGD noted an ulceration of the  first portion of the duodenum, no evidence of malignancy was noted. We  also used this opportunity to perform a leak test.  We noted no evidence  of bubbling whatsoever with insufflation of the duodenum and saline  placed around the repair. The EGD scope was then pulled back and the  mucosa was inspected. There were no abnormalities grossly noted at the  stomach. Brief retroflexion also confirmed no large masses. The scope  was then removed as the stomach was desufflated and an NG tube was then  replaced and secured. A 19-[place] drain was then placed through the right upper quadrant 5 mm  port site and directed to just above the repair, underneath the right  lobe of the liver. The 0 Vicryl suture passer was then used to close  the fascia of the 12 mm port site. All the laparoscopic ports were then  removed under direct visualization. No bleeding was noted of the  abdominal wall. The abdomen was completely desufflated. All incisions  were then irrigated and closed with a combination of 4-0 Monocryl and  skin glue and a 2-0 silk was then used for the drain stitch. The patient tolerated the procedure well.   She was extubated and brought  to PACU in stable condition. All counts correct x2 at the end of the  procedure. The patient's daughter was updated on the operative  findings.         Bob Venegas MD    D: 09/07/2021 15:43:56       T: 09/07/2021 20:15:54     [email or web address]  Job#: [id number]     Doc#: [id number]    CC:

## 2021-09-08 NOTE — PROGRESS NOTES
Occupational Therapy   Occupational Therapy Initial Assessment and Treatment Note  Date: 2021   Patient Name: Marleni Zambrano  MRN: [id number]     : 1930    Date of Service: 2021    Discharge Recommendations:    Marleni Zambrano scored a 15/24 on the AM-PAC ADL Inpatient form. Current research shows that an AM-PAC score of 18 or greater is typically associated with a discharge to the patient's home setting. Based on the patient's AM-PAC score, and their current ADL deficits, it is recommended that the patient have 2-3 sessions per week of Occupational Therapy at d/c to increase the patient's independence. At this time, this patient demonstrates the endurance and safety to discharge home with home services and a follow up treatment frequency of 2-3x/wk. Please see assessment section for further patient specific details. If patient discharges prior to next session this note will serve as a discharge summary. Please see below for the latest assessment towards goals. OT Equipment Recommendations  Equipment Needed: No    Assessment   Performance deficits / Impairments: Decreased functional mobility ; Decreased ADL status; Decreased safe awareness;Decreased endurance;Decreased balance  Assessment: Patient demonstrate slight decline from functional baseline, currently requiring mod assist for bed mobility and CGA for functional transfers. Daughter works from home and should be able to provide 24hrA at d/c; however, there is question regarding if patient will return to daughter's house or her apartment (patient has to manage stairs with dtr but not at her apt). Expected to make good progress and be able to return home with 24hrA from dtr. Would benefit from continued IP OT services, will follow while in house.   Treatment Diagnosis: impaired ADLs and functional transfers d/t duodenal ulcer with perforation  Prognosis: Good  Decision Making: Medium Complexity  OT Education: OT Role;Plan of Care;Transfer Training  Patient Education: verb understanding  REQUIRES OT FOLLOW UP: Yes  Activity Tolerance  Activity Tolerance: Patient Tolerated treatment well  Safety Devices  Safety Devices in place: Yes  Type of devices: Call light within reach; Chair alarm in place; Left in chair;Nurse notified           Patient Diagnosis(es): The encounter diagnosis was Duodenal ulcer with perforation ([facility] [address] has a past medical history of Arthritis, Asthma, Cervical radiculopathy at C5 bilaterally and left C7, Diabetes mellitus ([facility] [address] Hyperlipidemia, and Hypertension. has a past surgical history that includes Vaginal prolapse repair; joint replacement; Foot surgery; Hysterectomy; eye surgery; Bunionectomy (11/11/11); Hammer toe surgery (11/11/11); and laparoscopy (N/A, 9/7/2021). Treatment Diagnosis: impaired ADLs and functional transfers d/t duodenal ulcer with perforation      Restrictions  Position Activity Restriction  Other position/activity restrictions: NPO, no activity orders listed    Subjective   General  Additional Pertinent Hx: 81 yo admit to ED 9/7 p/w abdominal pain. Undergoing LAPAROSCOPY, SUTURE REPAIR OF DUODENAL ULCER, OMENTEM FLAP, EGD on 9/7. PMHx: arthritis, asthma, HLD, DM, HTN, B knee replacements  Family / Caregiver Present: No  Referring Practitioner: Magalys Fuentes MD  Diagnosis: duodenal ulcer with perforation  Subjective  Subjective: Patient in bed upon arrival, agreeable to OT evaluation. Requesting ice chips because \"it feels like there is something stuck in my throat\" - spoke with RN d/t listed NPO status, cleared to give cold water and oral swab only.  Patient provided with swab for increased comfort  Patient Currently in Pain: No  Pain Assessment  Pain Assessment: 0-10  Pain Level: 0  Jaramillo-Baker Pain Rating: No hurt  Vital Signs  Temp: 98.5 °F (36.9 °C)  Temp Source: Oral  Pulse: 96  Resp: 19  BP: 124/60  MAP (mmHg): 79  Level of Consciousness: Alert (0)  Patient Currently in Pain: No  Oxygen Therapy  SpO2: 97 %  Pulse Oximeter Device Mode: Continuous  Pulse Oximeter Device Location: Finger  O2 Device: Nasal cannula  Skin Assessment: Clean, dry, & intact  O2 Flow Rate (L/min): 2 L/min  Social/Functional History  Social/Functional History  Lives With: Alone (has been living with daughter for last month, unsure of where discharge disposition)  Type of Home: Apartment  Home Layout: One level  Home Access: Elevator, Level entry  Bathroom Shower/Tub: Walk-in shower  Bathroom Toilet: Standard (with RTS with rails)  Bathroom Equipment: Built-in shower seat, Grab bars in shower, Hand-held shower  Home Equipment: Rolling walker, Cane, Electric scooter, [employer]  ADL Assistance: Independent  Homemaking Responsibilities: No (daughter completes)  Ambulation Assistance: Independent (with RW)  Transfer Assistance: Independent  Active : No  Additional Comments: Information above is for patients apartment (she has been staying with daughter for last month but is unsure where she will go after dc. Daughter works from home and able to provide 24hrA. Her house has bed/bath on second level and ~1-2 MAKENZIE with B rail. 1/2 bath on first floor, walk in shower upstairs)       Objective   Vision: Within Functional Limits (glassess)  Hearing: Within functional limits (slight [place])    Orientation  Overall Orientation Status: Within Normal Limits     Functional Mobility  Functional - Mobility Device: Rolling Walker  Assist Level: Contact guard assistance  Functional Mobility Comments: bed<>chair transfer  Toilet Transfers  Toilet Transfers Comments: simulated with bed<>chair transfer  ADL  Feeding: NPO  Grooming: Setup (oral care swab only)  Toileting: Dependent/Total (Anguiano)  Bed mobility  Rolling to Left: Moderate assistance (with v/c for technique and use of hand rail)  Supine to Sit: Moderate assistance (HOB elevated, assist at trunk and tactile cueing for LE.  Increased time to complete)  Transfers  Sit to stand: Contact guard assistance  Stand to sit: Contact guard assistance  Cognition  Overall Cognitive Status: [facility]     Plan   Plan  Times per week: 2-5  Times per day: Daily  Current Treatment Recommendations: Functional Mobility Training, Endurance Training, Safety Education & Training, Self-Care / ADL, Patient/Caregiver Education & Training, Equipment Evaluation, Education, & procurement, Strengthening    AM-PAC Score        AM-PAC Inpatient Daily Activity Raw Score: 15 (09/08/21 0858)  AM-PAC Inpatient ADL T-Scale Score : 34.69 (09/08/21 0858)  ADL Inpatient CMS 0-100% Score: 56.46 (09/08/21 0858)  ADL Inpatient CMS G-Code Modifier : CK (09/08/21 0858)    Goals  Short term goals  Time Frame for Short term goals: by d/c  Short term goal 1: Patient will complete LB dressing with SBA and use of AE prn  Short term goal 2: Patient will complete bathroom mobility with LRAD and SPVN  Short term goal 3: Patient will complete grooming task in stance at sink with SPVN  Short term goal 4: Patient will complete functional transfers, including BSC/toilet transfer, with SPVN       Therapy Time   Individual Concurrent Group Co-treatment   Time In 0805         Time Out 0846         Minutes [id number] [facility], OTR/L #[id number]

## 2021-09-08 NOTE — PROGRESS NOTES
4 Eyes Skin Assessment     The patient is being assess for   {Blank single:77495::\"Admission\",\"Transfer to New Unit\",\"Post-Op Surgical\",\"Cath Lab Post-Op\",\"Shift Handoff\"}    I agree that 2 RN's have performed a thorough Head to Toe Skin Assessment on the patient. ALL assessment sites listed below have been assessed. Areas assessed for pressure by both nurses:   [x]   Head, Face, and Ears   [x]   Shoulders, Back, and Chest, Abdomen  [x]   Arms, Elbows, and Hands   [x]   Coccyx, Sacrum, and Ischium  [x]   Legs, Feet, and Heels                              **SHARE this note so that the co-signing nurse is able to place an eSignature**    Co-signer eSignature: {Esignature:[id number]}    Does the Patient have Skin Breakdown related to pressure?     Baseline skin Pemphiagus Follaceous    (Insert Photo here***)         Matheus Prevention initiated:  yes  Wound Care Orders initiated: n/a      WOC nurse consulted for Pressure Injury (Stage 3,4, Unstageable, DTI, NWPT, Complex wounds)and New or Established Ostomies: N/a     Primary Nurse eSignature: Janette Otoole RN

## 2021-09-08 NOTE — PROGRESS NOTES
4 Eyes Admission Assessment     I agree as the admission nurse that 2 RN's have performed a thorough Head to Toe Skin Assessment on the patient. ALL assessment sites listed below have been assessed on admission. Areas assessed by both nurses:   [x]   Head, Face, and Ears   [x]   Shoulders, Back, and Chest  [x]   Arms, Elbows, and Hands   [x]   Coccyx, Sacrum, and Ischium  [x]   Legs, Feet, and Heels       Patient has underlying skin disorder at baseline (scabs scattered over body)  Does the Patient have Skin Breakdown?   No         Matheus Prevention initiated:  NA   Wound Care Orders initiated:  NA      WOC nurse consulted for Pressure Injury (Stage 3,4, Unstageable, DTI, NWPT, and Complex wounds) or Matheus score 18 or lower:  NA      Nurse 1 eSignature: Electronically signed by Bob Mcardle, RN on 9/8/21 at 5:07 PM EDT    **SHARE this note so that the co-signing nurse is able to place an eSignature**    Nurse 2 eSignature: Electronically signed by Carolyn Garcia RN on 9/8/21 at 5:13 PM EDT

## 2021-09-08 NOTE — PROGRESS NOTES
Assessment complete/ pt remains hemodynamically stable/ BS present/ no complaints of pain/ will increase ADLs/ PT/OT today/ am lab specimen drawn and sent/ see orders/notes/flowsheet

## 2021-09-09 LAB
ALBUMIN SERPL-MCNC: 2.4 G/DL (ref 3.4–5)
ANION GAP SERPL CALCULATED.3IONS-SCNC: 9 MMOL/L (ref 3–16)
BASOPHILS ABSOLUTE: 0 K/UL (ref 0–0.2)
BASOPHILS RELATIVE PERCENT: 0.3 %
BUN BLDV-MCNC: 12 MG/DL (ref 7–20)
CALCIUM SERPL-MCNC: 7.8 MG/DL (ref 8.3–10.6)
CHLORIDE BLD-SCNC: 108 MMOL/L (ref 99–110)
CO2: 20 MMOL/L (ref 21–32)
CREAT SERPL-MCNC: 0.7 MG/DL (ref 0.6–1.2)
EOSINOPHILS ABSOLUTE: 0.1 K/UL (ref 0–0.6)
EOSINOPHILS RELATIVE PERCENT: 1.4 %
GFR AFRICAN AMERICAN: >60
GFR NON-AFRICAN AMERICAN: >60
GLUCOSE BLD-MCNC: 109 MG/DL (ref 70–99)
GLUCOSE BLD-MCNC: 71 MG/DL (ref 70–99)
GLUCOSE BLD-MCNC: 73 MG/DL (ref 70–99)
GLUCOSE BLD-MCNC: 76 MG/DL (ref 70–99)
GLUCOSE BLD-MCNC: 97 MG/DL (ref 70–99)
HCT VFR BLD CALC: 21.4 % (ref 36–48)
HEMOGLOBIN: 7.1 G/DL (ref 12–16)
LYMPHOCYTES ABSOLUTE: 0.7 K/UL (ref 1–5.1)
LYMPHOCYTES RELATIVE PERCENT: 10 %
MAGNESIUM: 1.7 MG/DL (ref 1.8–2.4)
MCH RBC QN AUTO: 26.4 PG (ref 26–34)
MCHC RBC AUTO-ENTMCNC: 33.3 G/DL (ref 31–36)
MCV RBC AUTO: 79.3 FL (ref 80–100)
MONOCYTES ABSOLUTE: 0.5 K/UL (ref 0–1.3)
MONOCYTES RELATIVE PERCENT: 6.8 %
NEUTROPHILS ABSOLUTE: 5.4 K/UL (ref 1.7–7.7)
NEUTROPHILS RELATIVE PERCENT: 81.5 %
PDW BLD-RTO: 16.3 % (ref 12.4–15.4)
PERFORMED ON: ABNORMAL
PERFORMED ON: NORMAL
PHOSPHORUS: 2.9 MG/DL (ref 2.5–4.9)
PLATELET # BLD: 216 K/UL (ref 135–450)
PMV BLD AUTO: 7.7 FL (ref 5–10.5)
POTASSIUM SERPL-SCNC: 3.5 MMOL/L (ref 3.5–5.1)
RBC # BLD: 2.7 M/UL (ref 4–5.2)
SODIUM BLD-SCNC: 137 MMOL/L (ref 136–145)
WBC # BLD: 6.7 K/UL (ref 4–11)

## 2021-09-09 PROCEDURE — 6360000002 HC RX W HCPCS

## 2021-09-09 PROCEDURE — 1200000000 HC SEMI PRIVATE

## 2021-09-09 PROCEDURE — 6360000002 HC RX W HCPCS: Performed by: STUDENT IN AN ORGANIZED HEALTH CARE EDUCATION/TRAINING PROGRAM

## 2021-09-09 PROCEDURE — 2580000003 HC RX 258

## 2021-09-09 PROCEDURE — C9113 INJ PANTOPRAZOLE SODIUM, VIA: HCPCS | Performed by: STUDENT IN AN ORGANIZED HEALTH CARE EDUCATION/TRAINING PROGRAM

## 2021-09-09 PROCEDURE — 36415 COLL VENOUS BLD VENIPUNCTURE: CPT

## 2021-09-09 PROCEDURE — 97162 PT EVAL MOD COMPLEX 30 MIN: CPT

## 2021-09-09 PROCEDURE — 2500000003 HC RX 250 WO HCPCS

## 2021-09-09 PROCEDURE — 85025 COMPLETE CBC W/AUTO DIFF WBC: CPT

## 2021-09-09 PROCEDURE — 83735 ASSAY OF MAGNESIUM: CPT

## 2021-09-09 PROCEDURE — 94664 DEMO&/EVAL PT USE INHALER: CPT

## 2021-09-09 PROCEDURE — 99024 POSTOP FOLLOW-UP VISIT: CPT | Performed by: SURGERY

## 2021-09-09 PROCEDURE — 80069 RENAL FUNCTION PANEL: CPT

## 2021-09-09 PROCEDURE — 2500000003 HC RX 250 WO HCPCS: Performed by: STUDENT IN AN ORGANIZED HEALTH CARE EDUCATION/TRAINING PROGRAM

## 2021-09-09 PROCEDURE — 97116 GAIT TRAINING THERAPY: CPT

## 2021-09-09 RX ORDER — POTASSIUM CHLORIDE 7.45 MG/ML
10 INJECTION INTRAVENOUS
Status: COMPLETED | OUTPATIENT
Start: 2021-09-09 | End: 2021-09-09

## 2021-09-09 RX ORDER — MAGNESIUM SULFATE IN WATER 40 MG/ML
2000 INJECTION, SOLUTION INTRAVENOUS ONCE
Status: COMPLETED | OUTPATIENT
Start: 2021-09-09 | End: 2021-09-09

## 2021-09-09 RX ORDER — POTASSIUM CHLORIDE 7.45 MG/ML
10 INJECTION INTRAVENOUS
Status: DISPENSED | OUTPATIENT
Start: 2021-09-09 | End: 2021-09-09

## 2021-09-09 RX ADMIN — PANTOPRAZOLE SODIUM 40 MG: 40 INJECTION, POWDER, FOR SOLUTION INTRAVENOUS at 11:56

## 2021-09-09 RX ADMIN — POTASSIUM PHOSPHATE, MONOBASIC POTASSIUM PHOSPHATE, DIBASIC 10 MMOL: 224; 236 INJECTION, SOLUTION, CONCENTRATE INTRAVENOUS at 12:10

## 2021-09-09 RX ADMIN — CIPROFLOXACIN 400 MG: 2 INJECTION, SOLUTION INTRAVENOUS at 05:56

## 2021-09-09 RX ADMIN — MAGNESIUM SULFATE HEPTAHYDRATE 2000 MG: 2 INJECTION, SOLUTION INTRAVENOUS at 17:03

## 2021-09-09 RX ADMIN — PANTOPRAZOLE SODIUM 40 MG: 40 INJECTION, POWDER, FOR SOLUTION INTRAVENOUS at 21:26

## 2021-09-09 RX ADMIN — POTASSIUM CHLORIDE 10 MEQ: 7.45 INJECTION INTRAVENOUS at 15:53

## 2021-09-09 RX ADMIN — METRONIDAZOLE 500 MG: 500 INJECTION, SOLUTION INTRAVENOUS at 23:10

## 2021-09-09 RX ADMIN — POTASSIUM CHLORIDE 10 MEQ: 7.45 INJECTION INTRAVENOUS at 22:33

## 2021-09-09 RX ADMIN — FLUCONAZOLE 400 MG: 2 INJECTION, SOLUTION INTRAVENOUS at 12:35

## 2021-09-09 RX ADMIN — METRONIDAZOLE 500 MG: 500 INJECTION, SOLUTION INTRAVENOUS at 17:00

## 2021-09-09 RX ADMIN — POTASSIUM CHLORIDE 10 MEQ: 7.45 INJECTION INTRAVENOUS at 20:03

## 2021-09-09 RX ADMIN — ENOXAPARIN SODIUM 40 MG: 40 INJECTION SUBCUTANEOUS at 11:57

## 2021-09-09 RX ADMIN — METRONIDAZOLE 500 MG: 500 INJECTION, SOLUTION INTRAVENOUS at 05:58

## 2021-09-09 RX ADMIN — POTASSIUM CHLORIDE 10 MEQ: 7.45 INJECTION INTRAVENOUS at 21:13

## 2021-09-09 RX ADMIN — CIPROFLOXACIN 400 MG: 2 INJECTION, SOLUTION INTRAVENOUS at 21:26

## 2021-09-09 ASSESSMENT — PAIN SCALES - GENERAL
PAINLEVEL_OUTOF10: 0
PAINLEVEL_OUTOF10: 0

## 2021-09-09 NOTE — PROGRESS NOTES
Patient AOx4. Patient has a clifford catheter that is draining clear yellow urine. Patient has an NG tube to continuous low wall suction. Patient is very anxious and concerned about her NG tube falling out. Reassured patient that NG tube was still at the same cm that it has been. Bed alarm on. Call light within reach. Will continue to monitor.

## 2021-09-09 NOTE — PROGRESS NOTES
Made Dr. Veronica Santillan aware that patient's BS was 76 at this time. Patient's order for D50 states for BS below 70. MD stated to just recheck the blood sugar with her am vitals.

## 2021-09-09 NOTE — PROGRESS NOTES
Physical Therapy    Facility/Department: [facility]  Initial Assessment/Treatment    NAME: Nano Correia  : 1930  MRN: [id number]    Date of Service: 2021    Discharge Recommendations: Nano Correia scored a 16/24 on the AM-PAC short mobility form. Current research shows that an AM-PAC score of 17 or less is typically not associated with a discharge to the patient's home setting. Based on the patient's AM-PAC score and their current functional mobility deficits, it is recommended that the patient have 3-5 sessions per week of Physical Therapy at d/c to increase the patient's independence. Please see assessment section for further patient specific details. If patient discharges prior to next session this note will serve as a discharge summary. Please see below for the latest assessment towards goals. PT Equipment Recommendations  Equipment Needed: No    Assessment   Body structures, Functions, Activity limitations: Decreased functional mobility ; Decreased strength;Decreased endurance  Assessment: 79 yo admitted with abdominal pain &underwent LAPAROSCOPY, SUTURE REPAIR OF DUODENAL ULCER, OMENTEM FLAP, EGD. Demo low endurance & could only tolerate ambulating a short distance in room. Did require A for transfers & bed mobility. Pt is not at an independent level & would require assist with all activity if was to return home. Pt is unsure of [place] plans & states she will do whatever her daughter wants her to do. Would be appropriate for ongoing IP PT at MN if daughter were unable to provide 24 hr A. Treatment Diagnosis: impaired mobility  Prognosis: Good  Decision Making: Medium Complexity  PT Education: Goals;PT Role;Plan of Care  Patient Education: verbalized understanding  REQUIRES PT FOLLOW UP: Yes  Activity Tolerance  Activity Tolerance: Patient limited by endurance; Patient limited by fatigue       Patient Diagnosis(es): The encounter diagnosis was Duodenal ulcer with perforation ([address] has a past medical history of Arthritis, Asthma, Cervical radiculopathy at C5 bilaterally and left C7, Diabetes mellitus ([address] Hyperlipidemia, and Hypertension. has a past surgical history that includes Vaginal prolapse repair; joint replacement; Foot surgery; Hysterectomy; eye surgery; Bunionectomy (11/11/11); Hammer toe surgery (11/11/11); and laparoscopy (N/A, 9/7/2021). Restrictions  Position Activity Restriction  Other position/activity restrictions: NPO, no activity orders listed  Vision/Hearing  Vision: Within Functional Limits  Hearing: Within functional limits     Subjective  General  Chart Reviewed: Yes  Additional Pertinent Hx: 81 yo admit to ED 9/7 p/w abdominal pain. Underwent LAPAROSCOPY, SUTURE REPAIR OF DUODENAL ULCER, OMENTEM FLAP, EGD on 9/7. PMHx: arthritis, asthma, HLD, DM, HTN, B knee replacements  Family / Caregiver Present: No  Referring Practitioner: Stefano Shell MD  Diagnosis: duodenal ulcer with perforation  Follows Commands: Within Functional Limits  Subjective  Subjective: Found in chair, agreeable to PT. Wants to go back to bed. \"I'm so cold. \"  Pain Screening  Patient Currently in Pain: Denies  Vital Signs  Patient Currently in Pain: Denies       Orientation  Orientation  Overall Orientation Status: Within Functional Limits  Social/Functional History  Social/Functional History  Lives With: Alone (has been living with daughter for last month, unsure of where discharge disposition)  Type of Home: Apartment  Home Layout: One level  Home Access: Elevator, Level entry  Bathroom Shower/Tub: Walk-in shower  Bathroom Toilet: Standard (with RTS with rails)  Bathroom Equipment: Built-in shower seat, Grab bars in shower, Hand-held shower  Home Equipment: Rolling walker, Cane, Electric scooter, [employer]  ADL Assistance: Independent  Homemaking Responsibilities: No (daughter completes)  Ambulation Assistance: Independent (with RW)  Transfer Assistance: Independent  Active : No  Additional Comments: Information above is for patients apartment (she has been staying with daughter for last month but is unsure where she will go after dc. Daughter works from home and able to provide 24hrA. Her house has bed/bath on second level and ~1-2 MAKENZIE with B rail. 1/2 bath on first floor, walk in shower upstairs)  Pt unsure how much longer daughter can be there with her. Cognition        Objective          AROM RLE (degrees)  RLE AROM: WFL  AROM LLE (degrees)  LLE AROM : WFL  Strength RLE  Strength RLE: WFL  Comment: quads/df WFL, hip flex 4-  Strength LLE  Strength LLE: WFL  Comment: quads/df WFL, hip flex 4-        Bed mobility  Sit to Supine: Minimal assistance (HOB elev)  Scootin Person assistance (D of 2 to scoot up in bed)  Transfers  Sit to Stand: Moderate Assistance  Stand to sit: Contact guard assistance  Ambulation  Ambulation?: Yes  Ambulation 1  Surface: level tile  Device: Rolling Walker  Assistance: Minimal assistance;Contact guard assistance (min A initially- progressing to CGA)  Quality of Gait: fairly steady gait but fatigued quickly & then started leaning over on walker  Gait Deviations: Slow Adrienne  Distance: 12'  Stairs/Curb  Stairs?: No     Balance  Sitting - Static: Good    Treatment included gait/transfer training, pt education.       Plan   Plan  Times per week: 2-5  Current Treatment Recommendations: Balance Training, Functional Mobility Training, Stair training, Gait Training, Strengthening, Endurance Training, Transfer Training, Patient/Caregiver Education & Training  Safety Devices  Type of devices: Bed alarm in place, Left in bed, Nurse notified, Call light within reach                                                      AM-PAC Score  AM-PAC Inpatient Mobility Raw Score : 16 (21)  AM-PAC Inpatient T-Scale Score : 40.78 (21)  Mobility Inpatient CMS 0-100% Score: 54.16 (21)  Mobility Inpatient CMS G-Code Modifier : CK (09/09/21 1224)          Goals  Short term goals  Time Frame for Short term goals: dc  Short term goal 1: Bed Mobility SBA  Short term goal 2: Sit<>stand SBA  Short term goal 3: Ambulate 48' with RW SBA  Short term goal 4: up/down 2 steps with B rails CGA  Patient Goals   Patient goals : \"I'll go wherever my daughter wants me to go. \"       Therapy Time   Individual Concurrent Group Co-treatment   Time In 3387         Time Out 1106         Minutes 30           Timed Code Treatment Minutes:   15    Total Treatment Minutes:  [address], [address]

## 2021-09-09 NOTE — CARE COORDINATION
Case Management Assessment           Initial Evaluation                Date / Time of Evaluation: 9/9/2021 3:27 PM                 Assessment Completed by: Juarez Negron RN    Patient Name: Sandra Meyer     YOB: 1930  Diagnosis: Duodenal ulcer with perforation ([facility] [address] [K26.5]  Duodenal perforation ([facility] [address] [K63.1]     Date / Time: 9/7/2021  6:12 AM    Patient Admission Status: Inpatient    If patient is discharged prior to next notation, then this note serves as note for discharge by case management. Current PCP: Иван Galavzi MD  Clinic Patient: No    Chart Reviewed: Yes  Patient/ Family Interviewed: Yes    Initial assessment completed at bedside with:  patient    Hospitalization in the last 30 days: No    Emergency Contacts:  Extended Emergency Contact Information  Primary Emergency Contact: Mitch Carnes [address] Phone: [phone]  Work Phone: [phone]  Relation: Child  Secondary Emergency Contact: [address] Gee Carrilloe of [address] [place] [address] Phone: [phone]  Relation: Child    Advance Directives:   Code Status: Prior     Financial  Payor: [facility] Gee Akers / Plan: [place] / Product Type: *No Product type* /     Pre-cert required for SNF: Yes    Pharmacy    [address], [address] Herb Douse [phone]  [facility] [phone]  Phone: [phone] Fax: [phone] [address] [facility], [address] [place] [address] [phone] Herb Douse [phone]  [address] [facility], [address] [place] [address],[address]  [facility] [address]  Phone: [phone] Fax: [phone]      Potential assistance Purchasing Medications:    Does Patient want to participate in local refill/ meds to beds program?:      Meds To Beds General Rules:  1. Can ONLY be done Monday- Friday between 8:30am-5pm  2. Prescription(s) must be in pharmacy by 3pm to be filled same day  3. Copy of patient's insurance/ prescription drug card and patient face sheet must be sent along with the prescription(s)  4. Cost of Rx cannot be added to hospital bill. If financial assistance is needed, please contact unit  or ;  or  CANNOT provide pharmacy voucher for patients co-pays  5. Patients can then  the prescription on their way out of the hospital at discharge, or pharmacy can deliver to the bedside if staff is available. (payment due at time of pick-up or delivery - cash, check, or card accepted)     Able to afford home medications/ co-pay costs: Yes    ADLS  Support Systems:      PT AM-PAC: 16 /24  OT AM-PAC: 15 /24    [place]: home with daughter   Steps:      Plans to RETURN to current housing: Yes  Barriers to RETURNING to current housing: medical stability    Home Care Information  Currently ACTIVE with [address]: No  Home Care Agency: Not Applicable         Durable Medical Equipment  DME Provider:    Equipment: wheelchair, cane and walker    Home Oxygen and Respiratory Equipment  Has HOME OXYGEN prior to admission: No  [address]: Not Applicable  Other Respiratory Equipment:         Dialysis  Active with HD/PD prior to admission: No  Nephrologist:      HD Center:  Not Applicable    DISCHARGE PLAN:  Disposition: Home with [address]: TBD     Transportation PLAN for discharge: family     Factors facilitating achievement of predicted outcomes: Family support, Cooperative and Pleasant    Barriers to discharge: medical stability    Additional Case Management Notes:  Cm met with pt. Pt currently lives with daughter. Pt asked CM to speak to daughter Sheila Patel to make sure plan is still to return to her home. Pt has never had home care and has never been to a SNF. CM spoke with Sheila Patel on telephone. Plan for pt to return to her home. Sheila Patel recently moved her mother there.  She has been trying to make accomodations for her mothers return. They are open to home care. A home care list was emailed to Flynn at [email or web address]. The Plan for Transition of Care is related to the following treatment goals of Duodenal ulcer with perforation ([facility] [address] [K26.5]  Duodenal perforation ([facility] [address] [K63.1]    The Patient and/or patient representative Casimiro Arnold and her family were provided with a choice of provider and agrees with the discharge plan yes    Freedom of choice list was provided with basic dialogue that supports the patient's individualized plan of care/goals and shares the quality data associated with the providers.  Yes    Care Transition patient: No    Lamonte Gimenez RN  [facility]  Case Management Department  Ph: [phone]

## 2021-09-09 NOTE — PROGRESS NOTES
SURGERY DAILY PROGRESS NOTE    CC: duodenal ulcer with perforation     SUBJECTIVE:   Interval Hx:   NAEON. Denies nausea, vomiting. Denies abdominal pain. AF HDS.     ROS: A 14 point review of systems was conducted, significant findings as noted above. All other systems negative. OBJECTIVE:   Vitals:   Vitals:    09/08/21 1959 09/08/21 2018 09/08/21 2345 09/09/21 0400   BP: (!) 160/75  134/72 133/72   Pulse: 100  97 94   Resp: 16 16 16 16   Temp: 98.2 °F (36.8 °C)  98 °F (36.7 °C) 98.1 °F (36.7 °C)   TempSrc: Oral  Oral Oral   SpO2: 95% 91% 93% 91%   Weight:       Height:           I/O:     Intake/Output Summary (Last 24 hours) at 9/9/2021 0616  Last data filed at 9/8/2021 2123  Gross per 24 hour   Intake 1050 ml   Output 610 ml   Net 440 ml     I/O last 3 completed shifts: In: 2880.9 [P.O.:100; I.V.:2530.9; IV Piggyback:250]  Out: 2290 [Urine:750; Emesis/NG output:100; Drains:210]    Diet: Diet NPO Exceptions are: Ice Chips      Physical Examination:   General appearance: alert, no acute distress, grooming appropriate  Eyes: No scleral icterus, EOM grossly intact  Neck: trachea midline, no JVD, no lymphadenopathy, neck supple  Chest/Lungs: normal effort with no accessory muscle use on 2L NC  Cardiovascular: RRR, well perfused  Abdomen: Soft, appropriately-tender, incisions c/d/i and well approximated with Dermabond; MARIBELL drain x1 with serosanguinous contents; NG tube in place with bilious output  Skin: warm and dry, no rashes  Extremities: no edema, no cyanosis  Genitourinary:  Anguiano in place with clear yellow urine  Neuro: A&Ox3, no focal deficits, sensation intact    Labs:  CBC:   Recent Labs     09/07/21  0700 09/08/21  0809 09/09/21  0420   WBC 5.5 7.2 6.7   HGB 9.6* 7.5* 7.1*   HCT 29.4* 23.1* 21.4*    235 216       BMP:   Recent Labs     09/07/21  0700 09/08/21  0809 09/09/21  0420    137 137   K 5.0 4.1 3.5    107 108   CO2 23 22 20*   BUN 15 14 12   CREATININE 0.8 0.7 0.7   GLUCOSE 104* 114* 71     LFT's:   Recent Labs     09/07/21  0700   AST 25   ALT 10   BILITOT <0.2   ALKPHOS 68     Troponin:   Recent Labs     09/07/21  0700   TROPONINI <0.01     BNP: No results for input(s): BNP in the last 72 hours. ABGs: No results for input(s): PHART, PYM2PEM, PO2ART in the last 72 hours. INR:   Recent Labs     09/07/21  0700   INR 1.88*       U/A:  Recent Labs     09/07/21  1044   COLORU Yellow   PHUR 6.0   CLARITYU Clear   SPECGRAV 1.015   LEUKOCYTESUR Negative   UROBILINOGEN 0.2   BILIRUBINUR Negative   BLOODU Negative   GLUCOSEU Negative        Rad:   CT CHEST PULMONARY EMBOLISM W CONTRAST   Final Result      CHEST:   1. No evidence of pulmonary embolism or other acute cardiopulmonary abnormality. 2.  Bibasilar atelectasis. 3.  Pulmonary artery enlargement can be seen with pulmonary artery hypertension. ABDOMEN AND PELVIS:   1. Perforated duodenal bulb ulcer with free air and fluid. 2.  Calcified renal artery aneurysm. 3.  Indeterminate 1.1 cm low-density lesion in the uncinate process of the pancreas. This could be further evaluated with nonemergent MRI. Critical result was reported the Esme Almanza MD 9/7/2021 9:59 AM.      CT ABDOMEN PELVIS W IV CONTRAST Additional Contrast? None   Final Result      CHEST:   1. No evidence of pulmonary embolism or other acute cardiopulmonary abnormality. 2.  Bibasilar atelectasis. 3.  Pulmonary artery enlargement can be seen with pulmonary artery hypertension. ABDOMEN AND PELVIS:   1. Perforated duodenal bulb ulcer with free air and fluid. 2.  Calcified renal artery aneurysm. 3.  Indeterminate 1.1 cm low-density lesion in the uncinate process of the pancreas. This could be further evaluated with nonemergent MRI.             Critical result was reported the Esme Almanza MD 9/7/2021 9:59 AM.          ASSESSMENT AND PLAN:   Mimi Merritt is a 80 y.o. female status post omental patch secondary to duodenal perforation (9/7). POD2.    - cont NPO IVF  - cont NGT  - will plan for upper GI tomorrow to evaluate repair  - keep MARIBELL in place  - cont Cipro/Flagyl/Diflucan; WBC WNL 6.7 from 7.2  - DC Anguiano  - will cont to hold Xarelto until tolerating PO  - cont IV Abx       Gabby Murray, DO  PGY-1, General Surgery  09/09/21  6:18 AM  [phone]

## 2021-09-09 NOTE — PROGRESS NOTES
Made Dr Matthieu Marroquin aware that patient's blood sugar at this time was 73. No new orders at this time.

## 2021-09-10 ENCOUNTER — APPOINTMENT (OUTPATIENT)
Dept: GENERAL RADIOLOGY | Age: 86
DRG: 329 | End: 2021-09-10
Payer: MEDICARE

## 2021-09-10 LAB
ALBUMIN SERPL-MCNC: 2.4 G/DL (ref 3.4–5)
ANION GAP SERPL CALCULATED.3IONS-SCNC: 13 MMOL/L (ref 3–16)
BASOPHILS ABSOLUTE: 0 K/UL (ref 0–0.2)
BASOPHILS RELATIVE PERCENT: 0.3 %
BUN BLDV-MCNC: 6 MG/DL (ref 7–20)
CALCIUM SERPL-MCNC: 8.1 MG/DL (ref 8.3–10.6)
CHLORIDE BLD-SCNC: 107 MMOL/L (ref 99–110)
CO2: 18 MMOL/L (ref 21–32)
CREAT SERPL-MCNC: 0.6 MG/DL (ref 0.6–1.2)
EOSINOPHILS ABSOLUTE: 0.2 K/UL (ref 0–0.6)
EOSINOPHILS RELATIVE PERCENT: 3.5 %
GFR AFRICAN AMERICAN: >60
GFR NON-AFRICAN AMERICAN: >60
GLUCOSE BLD-MCNC: 110 MG/DL (ref 70–99)
GLUCOSE BLD-MCNC: 115 MG/DL (ref 70–99)
GLUCOSE BLD-MCNC: 76 MG/DL (ref 70–99)
GLUCOSE BLD-MCNC: 77 MG/DL (ref 70–99)
GLUCOSE BLD-MCNC: 83 MG/DL (ref 70–99)
GLUCOSE BLD-MCNC: 92 MG/DL (ref 70–99)
HCT VFR BLD CALC: 22.2 % (ref 36–48)
HEMOGLOBIN: 7.4 G/DL (ref 12–16)
LYMPHOCYTES ABSOLUTE: 0.6 K/UL (ref 1–5.1)
LYMPHOCYTES RELATIVE PERCENT: 9.6 %
MAGNESIUM: 1.8 MG/DL (ref 1.8–2.4)
MCH RBC QN AUTO: 25.9 PG (ref 26–34)
MCHC RBC AUTO-ENTMCNC: 33.2 G/DL (ref 31–36)
MCV RBC AUTO: 78 FL (ref 80–100)
MONOCYTES ABSOLUTE: 0.5 K/UL (ref 0–1.3)
MONOCYTES RELATIVE PERCENT: 7.6 %
NEUTROPHILS ABSOLUTE: 5 K/UL (ref 1.7–7.7)
NEUTROPHILS RELATIVE PERCENT: 79 %
PDW BLD-RTO: 17 % (ref 12.4–15.4)
PERFORMED ON: ABNORMAL
PERFORMED ON: ABNORMAL
PERFORMED ON: NORMAL
PHOSPHORUS: 2.7 MG/DL (ref 2.5–4.9)
PLATELET # BLD: 238 K/UL (ref 135–450)
PMV BLD AUTO: 7.7 FL (ref 5–10.5)
POTASSIUM SERPL-SCNC: 3.8 MMOL/L (ref 3.5–5.1)
RBC # BLD: 2.85 M/UL (ref 4–5.2)
SODIUM BLD-SCNC: 138 MMOL/L (ref 136–145)
WBC # BLD: 6.3 K/UL (ref 4–11)

## 2021-09-10 PROCEDURE — 97530 THERAPEUTIC ACTIVITIES: CPT

## 2021-09-10 PROCEDURE — 74240 X-RAY XM UPR GI TRC 1CNTRST: CPT

## 2021-09-10 PROCEDURE — 97535 SELF CARE MNGMENT TRAINING: CPT

## 2021-09-10 PROCEDURE — 6360000002 HC RX W HCPCS: Performed by: STUDENT IN AN ORGANIZED HEALTH CARE EDUCATION/TRAINING PROGRAM

## 2021-09-10 PROCEDURE — 83735 ASSAY OF MAGNESIUM: CPT

## 2021-09-10 PROCEDURE — 80069 RENAL FUNCTION PANEL: CPT

## 2021-09-10 PROCEDURE — 1200000000 HC SEMI PRIVATE

## 2021-09-10 PROCEDURE — 2500000003 HC RX 250 WO HCPCS: Performed by: STUDENT IN AN ORGANIZED HEALTH CARE EDUCATION/TRAINING PROGRAM

## 2021-09-10 PROCEDURE — 85025 COMPLETE CBC W/AUTO DIFF WBC: CPT

## 2021-09-10 PROCEDURE — 6360000002 HC RX W HCPCS

## 2021-09-10 PROCEDURE — 2580000003 HC RX 258

## 2021-09-10 PROCEDURE — C9113 INJ PANTOPRAZOLE SODIUM, VIA: HCPCS | Performed by: STUDENT IN AN ORGANIZED HEALTH CARE EDUCATION/TRAINING PROGRAM

## 2021-09-10 PROCEDURE — 36415 COLL VENOUS BLD VENIPUNCTURE: CPT

## 2021-09-10 PROCEDURE — 99024 POSTOP FOLLOW-UP VISIT: CPT | Performed by: SURGERY

## 2021-09-10 PROCEDURE — 2580000003 HC RX 258: Performed by: STUDENT IN AN ORGANIZED HEALTH CARE EDUCATION/TRAINING PROGRAM

## 2021-09-10 PROCEDURE — 2500000003 HC RX 250 WO HCPCS

## 2021-09-10 RX ORDER — MAGNESIUM SULFATE IN WATER 40 MG/ML
2000 INJECTION, SOLUTION INTRAVENOUS ONCE
Status: COMPLETED | OUTPATIENT
Start: 2021-09-10 | End: 2021-09-10

## 2021-09-10 RX ORDER — POTASSIUM CHLORIDE 7.45 MG/ML
10 INJECTION INTRAVENOUS ONCE
Status: COMPLETED | OUTPATIENT
Start: 2021-09-10 | End: 2021-09-10

## 2021-09-10 RX ADMIN — HYDRALAZINE HYDROCHLORIDE 5 MG: 20 INJECTION INTRAMUSCULAR; INTRAVENOUS at 11:25

## 2021-09-10 RX ADMIN — METRONIDAZOLE 500 MG: 500 INJECTION, SOLUTION INTRAVENOUS at 15:40

## 2021-09-10 RX ADMIN — METRONIDAZOLE 500 MG: 500 INJECTION, SOLUTION INTRAVENOUS at 08:16

## 2021-09-10 RX ADMIN — ENOXAPARIN SODIUM 40 MG: 40 INJECTION SUBCUTANEOUS at 08:16

## 2021-09-10 RX ADMIN — PANTOPRAZOLE SODIUM 40 MG: 40 INJECTION, POWDER, FOR SOLUTION INTRAVENOUS at 08:16

## 2021-09-10 RX ADMIN — MAGNESIUM SULFATE HEPTAHYDRATE 2000 MG: 2 INJECTION, SOLUTION INTRAVENOUS at 10:03

## 2021-09-10 RX ADMIN — FLUCONAZOLE 400 MG: 2 INJECTION, SOLUTION INTRAVENOUS at 10:03

## 2021-09-10 RX ADMIN — METRONIDAZOLE 500 MG: 500 INJECTION, SOLUTION INTRAVENOUS at 23:23

## 2021-09-10 RX ADMIN — CIPROFLOXACIN 400 MG: 2 INJECTION, SOLUTION INTRAVENOUS at 17:23

## 2021-09-10 RX ADMIN — PANTOPRAZOLE SODIUM 40 MG: 40 INJECTION, POWDER, FOR SOLUTION INTRAVENOUS at 21:15

## 2021-09-10 RX ADMIN — POTASSIUM CHLORIDE 10 MEQ: 7.45 INJECTION INTRAVENOUS at 10:03

## 2021-09-10 RX ADMIN — HYDRALAZINE HYDROCHLORIDE 5 MG: 20 INJECTION INTRAMUSCULAR; INTRAVENOUS at 00:17

## 2021-09-10 RX ADMIN — HYDRALAZINE HYDROCHLORIDE 5 MG: 20 INJECTION INTRAMUSCULAR; INTRAVENOUS at 20:55

## 2021-09-10 RX ADMIN — POTASSIUM PHOSPHATE, MONOBASIC AND POTASSIUM PHOSPHATE, DIBASIC 10 MMOL: 224; 236 INJECTION, SOLUTION, CONCENTRATE INTRAVENOUS at 12:40

## 2021-09-10 RX ADMIN — CIPROFLOXACIN 400 MG: 2 INJECTION, SOLUTION INTRAVENOUS at 05:52

## 2021-09-10 RX ADMIN — SODIUM CHLORIDE: 9 INJECTION, SOLUTION INTRAVENOUS at 23:23

## 2021-09-10 ASSESSMENT — PAIN SCALES - GENERAL
PAINLEVEL_OUTOF10: 0

## 2021-09-10 NOTE — PROGRESS NOTES
SURGERY DAILY PROGRESS NOTE    CC: duodenal ulcer with perforation     SUBJECTIVE:   Interval Hx:   NAEON. Denies nausea, vomiting. Denies abdominal pain. AF HDS.     ROS: A 14 point review of systems was conducted, significant findings as noted above. All other systems negative. OBJECTIVE:   Vitals:   Vitals:    09/09/21 1945 09/10/21 0000 09/10/21 0045 09/10/21 0307   BP: (!) 164/87 (!) 162/85 (!) 149/80 (!) 142/75   Pulse: 94 93  95   Resp: 18 16  16   Temp: 97.7 °F (36.5 °C) 98 °F (36.7 °C)  98.1 °F (36.7 °C)   TempSrc: Oral Oral  Oral   SpO2: 95% 94%  94%   Weight:       Height:           I/O:     Intake/Output Summary (Last 24 hours) at 9/10/2021 0635  Last data filed at 9/10/2021 0411  Gross per 24 hour   Intake 1930.67 ml   Output 1995 ml   Net -64.33 ml     I/O last 3 completed shifts: In: 1930.7 [I.V.:383.9; IV Piggyback:1546.7]  Out: 1860 [Urine:1600; Emesis/NG output:150; Drains:110]    Diet: Diet NPO Exceptions are: Ice Chips      Physical Examination:   General appearance: alert, no acute distress, grooming appropriate    Chest/Lungs: normal effort with no accessory muscle use; on room air  Cardiovascular: RRR, well perfused  Abdomen: Soft, appropriately-tender, incisions c/d/i and well approximated with Dermabond; MARIBELL drain x1 with serosanguinous contents; NG tube in place with gastric output  Skin: warm and dry, no rashes  Extremities: no edema, no cyanosis  Genitourinary:  Anguiano in place with clear yellow urine  Neuro: A&Ox3, no focal deficits, sensation intact    Labs:  CBC:   Recent Labs     09/08/21  0809 09/09/21  0420 09/10/21  0554   WBC 7.2 6.7 6.3   HGB 7.5* 7.1* 7.4*   HCT 23.1* 21.4* 22.2*    216 238       BMP:   Recent Labs     09/07/21  0700 09/08/21  0809 09/09/21  0420    137 137   K 5.0 4.1 3.5    107 108   CO2 23 22 20*   BUN 15 14 12   CREATININE 0.8 0.7 0.7   GLUCOSE 104* 114* 71     LFT's:   Recent Labs     09/07/21  0700   AST 25   ALT 10   BILITOT <0.2 ALKPHOS 68     Troponin:   Recent Labs     09/07/21  0700   TROPONINI <0.01     BNP: No results for input(s): BNP in the last 72 hours. ABGs: No results for input(s): PHART, PKO6TEA, PO2ART in the last 72 hours. INR:   Recent Labs     09/07/21  0700   INR 1.88*       U/A:  Recent Labs     09/07/21  1044   COLORU Yellow   PHUR 6.0   CLARITYU Clear   SPECGRAV 1.015   LEUKOCYTESUR Negative   UROBILINOGEN 0.2   BILIRUBINUR Negative   BLOODU Negative   GLUCOSEU Negative        Rad:   CT CHEST PULMONARY EMBOLISM W CONTRAST   Final Result      CHEST:   1. No evidence of pulmonary embolism or other acute cardiopulmonary abnormality. 2.  Bibasilar atelectasis. 3.  Pulmonary artery enlargement can be seen with pulmonary artery hypertension. ABDOMEN AND PELVIS:   1. Perforated duodenal bulb ulcer with free air and fluid. 2.  Calcified renal artery aneurysm. 3.  Indeterminate 1.1 cm low-density lesion in the uncinate process of the pancreas. This could be further evaluated with nonemergent MRI. Critical result was reported the Cheri Rajput MD 9/7/2021 9:59 AM.      CT ABDOMEN PELVIS W IV CONTRAST Additional Contrast? None   Final Result      CHEST:   1. No evidence of pulmonary embolism or other acute cardiopulmonary abnormality. 2.  Bibasilar atelectasis. 3.  Pulmonary artery enlargement can be seen with pulmonary artery hypertension. ABDOMEN AND PELVIS:   1. Perforated duodenal bulb ulcer with free air and fluid. 2.  Calcified renal artery aneurysm. 3.  Indeterminate 1.1 cm low-density lesion in the uncinate process of the pancreas. This could be further evaluated with nonemergent MRI. Critical result was reported the Cheri Rajput MD 9/7/2021 9:59 AM.      [facility]    (Results Pending)       ASSESSMENT AND PLAN:   Lucy Taylor is a 80 y.o. female status post omental patch secondary to duodenal perforation (9/7).  POD3.    - will plan for upper GI this morning to evaluate repair-imaging results within normal range will remove NG and start on clears  - keep MARIBELL in place  - cont Cipro/Flagyl/Diflucan; WBC WNL 6.7 from 7.2  - DC Anguiano  - will cont to hold Xarelto until tolerating PO  - cont IV Abx         Attending Supervising Physician's Attestation Statement  I performed a history and physical examination on the patient and discussed the management with the nurse practitioner. I reviewed and agree with the findings and plan as documented in her note . Looks good. No pain. UGI neg for leak. NG removed. OK for clears, slow diet advancement. Dispo plan likely Monday.     Electronically signed by Marycarmen Cornell MD on 9/10/21 at 12:46 PM EDT

## 2021-09-10 NOTE — PROGRESS NOTES
Patient alert and oriented. VSS Patient denies any pain or nausea. NG Tube and Anguiano in place. Assessment done. see flowsheet. Patient in bed lowest position call light and bedside table within reach. All needs are met at this time. Patient aware to call if any help needed. Will continue to monitor.  Report given to day shift RN  BP (!) 142/75   Pulse 95   Temp 98.1 °F (36.7 °C) (Oral)   Resp 16   Ht 5' 4\" (1.626 m)   Wt 170 lb (77.1 kg)   SpO2 94%   BMI 29.18 kg/m²

## 2021-09-10 NOTE — PROGRESS NOTES
Occupational Therapy  Facility/Department: [address] [facility] [address] [facility]  Daily Treatment Note  NAME: Therman Point  : 1930  MRN: [id number]    Date of Service: 9/10/2021    Discharge Recommendations:  Therman Point scored a 15/24 on the AM-PAC ADL Inpatient form. Current research shows that an AM-PAC score of 18 or greater is typically associated with a discharge to the patient's home setting. Based on the patient's AM-PAC score, and their current ADL deficits, it is recommended that the patient have 2-3 sessions per week of Occupational Therapy at d/c to increase the patient's independence. At this time, this patient demonstrates the endurance and safety to discharge home with 24hr assist and HHOT. Please see assessment section for further patient specific details. If patient discharges prior to next session this note will serve as a discharge summary. Please see below for the latest assessment towards goals. HOME HEALTH CARE: LEVEL 1 STANDARD    - Initial home health evaluation to occur within 24-48 hours, in patient home   - Therapy to evaluate with goal of regaining prior level of functioning   - Therapy to evaluate if patient has [address] needs for personal care     OT Equipment Recommendations  Equipment Needed: No    Assessment   Performance deficits / Impairments: Decreased functional mobility ; Decreased ADL status; Decreased safe awareness;Decreased endurance;Decreased balance      Assessment: Pt limited by fatigue and pain this session declining bathroom mobility/tasks. Pt completed washing up seated in recliner chair. Functional mobility demontrated with RW and CGA. Pt would benefit from 24hr assist and HHOT upon discharge. Continue OT per POC.       Treatment Diagnosis: impaired ADLs and functional transfers d/t duodenal ulcer with perforation  Prognosis: Good  OT Education: OT Role;Plan of Care;Transfer Training  Patient Education: verb understanding  REQUIRES OT FOLLOW UP: VCs for RW managmnet  Bed mobility  Supine to Sit: Moderate assistance  Scooting:  Moderate assistance (scooting seated to EOB)  Comment: Pt requiring significant increased time for completion of bed mobility  Transfers  Sit to stand: Contact guard assistance  Stand to sit: Contact guard assistance  Transfer Comments: Pt stating \" dont pull, let me do it\"                  Plan  If pt discharges prior to next treatment, this note will serve as discharge summary  Plan  Times per week: 2-5  Times per day: Daily  Current Treatment Recommendations: Functional Mobility Training, Endurance Training, Safety Education & Training, Self-Care / ADL, Patient/Caregiver Education & Training, Equipment Evaluation, Education, & procurement, Strengthening             AM-PAC Score        AM-PAC Inpatient Daily Activity Raw Score: 15 (09/10/21 0958)  AM-PAC Inpatient ADL T-Scale Score : 34.69 (09/10/21 0958)  ADL Inpatient CMS 0-100% Score: 56.46 (09/10/21 0958)  ADL Inpatient CMS G-Code Modifier : CK (09/10/21 0958)    Goals (as determined and assessed by primary OT)  Short term goals  Time Frame for Short term goals: by d/c  Short term goal 1: Patient will complete LB dressing with SBA and use of AE prn - ongoing  Short term goal 2: Patient will complete bathroom mobility with LRAD and SPVN - ongoing  Short term goal 3: Patient will complete grooming task in stance at sink with SPVN - ongoing  Short term goal 4: Patient will complete functional transfers, including BSC/toilet transfer, with SPVN - ongoing       Therapy Time   Individual Concurrent Group Co-treatment   Time In 0810         Time Out 0850         Minutes 40         Timed Code Treatment Minutes: [address] [facility], R Twan [address] 46

## 2021-09-10 NOTE — PLAN OF CARE
Problem: Falls - Risk of:  Goal: Will remain free from falls  Description: Will remain free from falls  Outcome: Ongoing  Note: X1 assist with walker. Alarm on for safety. Call out appropriately.  Freq rounding

## 2021-09-10 NOTE — PROGRESS NOTES
POC  General Surgery    NGT removed at bedside. Patient tolerated well. Patient will be started on CLD; advised to take it slow initially.     Dawn Valdez DO  PGY-1, General Surgery  09/10/21  10:40 AM  [phone]

## 2021-09-10 NOTE — CARE COORDINATION
CTN contacted Ellyn with [facility] [phone]. They have accepted this patient and will pull referral from [facility].  They will contact patient and make arrangements for [facility] by 9/13  Electronically signed by Og Simons LPN on 0/10/3399 at 1:80 PM

## 2021-09-10 NOTE — CARE COORDINATION
Case Management Assessment           Daily Note                 Date/ Time of Note: 9/10/2021 4:38 PM         Note completed by: Cecelia Gaitan RN    Patient Name: Eliana Becker  YOB: 1930    Diagnosis:Duodenal ulcer with perforation ([address] [K26.5]  Duodenal perforation ([facility] [address] [K63.1]  Patient Admission Status: Inpatient    Date of Admission:9/7/2021  6:12 AM Length of Stay: 3 GLOS: GMLOS: 5.22    Current Plan of Care: *    Per General surgery :  NGT removed at bedside. Patient tolerated well. Patient will be started on CLD; advised to take it slow initially  ________________________________________________________________________________________  PT AM-PAC: 16 / 24 per last evaluation on: 09/09/2021    OT AM-PAC: 15 / 24 per last evaluation on: 09/10/2021    DME Needs for discharge: NA  ________________________________________________________________________________________  Discharge Plan: Home with [address]: TBD      Tentative discharge date: 1-2 days     Current barriers to discharge:  Medical clearance     Referrals completed: [address]: pending  , list sent to [facility]      Resources/ information provided: [address] List  ________________________________________________________________________________________  Case Management Notes:    CM cont to follow for  D/c planning:- Pt currently lives with daughter.   - Pt asked CM to speak to daughter Ham Keating to make sure plan is still to return to her home. - Pt has never had home care and has never been to a SNF.     CM spoke with Ham Keating on telephone. Plan for pt to return to her home. Ham Keating recently moved her mother there. She has been trying to make accomodations for her mothers return. They are open to home care.  A home care list was emailed to Ham Rishabh at [email or web address] following :   ([facility]  Can  Accept: )          Luis Angel Fall and her family were provided with choice of provider; she and her family are in agreement with the discharge plan.     Care Transition Patient: Wendi Bates RN  [facility]  Case Management Department  Ph: [phone]

## 2021-09-11 LAB
ABO/RH: NORMAL
ALBUMIN SERPL-MCNC: 2.3 G/DL (ref 3.4–5)
ANION GAP SERPL CALCULATED.3IONS-SCNC: 14 MMOL/L (ref 3–16)
ANTIBODY SCREEN: NORMAL
BASOPHILS ABSOLUTE: 0 K/UL (ref 0–0.2)
BASOPHILS RELATIVE PERCENT: 0.6 %
BLOOD BANK DISPENSE STATUS: NORMAL
BLOOD BANK PRODUCT CODE: NORMAL
BPU ID: NORMAL
BUN BLDV-MCNC: 4 MG/DL (ref 7–20)
CALCIUM SERPL-MCNC: 7.9 MG/DL (ref 8.3–10.6)
CHLORIDE BLD-SCNC: 104 MMOL/L (ref 99–110)
CO2: 19 MMOL/L (ref 21–32)
CREAT SERPL-MCNC: 0.6 MG/DL (ref 0.6–1.2)
DESCRIPTION BLOOD BANK: NORMAL
EOSINOPHILS ABSOLUTE: 0.3 K/UL (ref 0–0.6)
EOSINOPHILS RELATIVE PERCENT: 6.7 %
GFR AFRICAN AMERICAN: >60
GFR NON-AFRICAN AMERICAN: >60
GLUCOSE BLD-MCNC: 102 MG/DL (ref 70–99)
GLUCOSE BLD-MCNC: 112 MG/DL (ref 70–99)
GLUCOSE BLD-MCNC: 125 MG/DL (ref 70–99)
GLUCOSE BLD-MCNC: 184 MG/DL (ref 70–99)
GLUCOSE BLD-MCNC: 88 MG/DL (ref 70–99)
GLUCOSE BLD-MCNC: 98 MG/DL (ref 70–99)
GLUCOSE BLD-MCNC: 99 MG/DL (ref 70–99)
HCT VFR BLD CALC: 20.8 % (ref 36–48)
HCT VFR BLD CALC: 26.8 % (ref 36–48)
HEMOGLOBIN: 6.9 G/DL (ref 12–16)
HEMOGLOBIN: 9 G/DL (ref 12–16)
LYMPHOCYTES ABSOLUTE: 0.7 K/UL (ref 1–5.1)
LYMPHOCYTES RELATIVE PERCENT: 16.6 %
MAGNESIUM: 1.7 MG/DL (ref 1.8–2.4)
MCH RBC QN AUTO: 25.8 PG (ref 26–34)
MCHC RBC AUTO-ENTMCNC: 33.3 G/DL (ref 31–36)
MCV RBC AUTO: 77.7 FL (ref 80–100)
MONOCYTES ABSOLUTE: 0.5 K/UL (ref 0–1.3)
MONOCYTES RELATIVE PERCENT: 11.1 %
NEUTROPHILS ABSOLUTE: 2.8 K/UL (ref 1.7–7.7)
NEUTROPHILS RELATIVE PERCENT: 65 %
PDW BLD-RTO: 16.5 % (ref 12.4–15.4)
PERFORMED ON: ABNORMAL
PERFORMED ON: NORMAL
PHOSPHORUS: 3.2 MG/DL (ref 2.5–4.9)
PLATELET # BLD: 241 K/UL (ref 135–450)
PMV BLD AUTO: 7.4 FL (ref 5–10.5)
POTASSIUM SERPL-SCNC: 3.4 MMOL/L (ref 3.5–5.1)
RBC # BLD: 2.68 M/UL (ref 4–5.2)
SODIUM BLD-SCNC: 137 MMOL/L (ref 136–145)
WBC # BLD: 4.3 K/UL (ref 4–11)

## 2021-09-11 PROCEDURE — 2580000003 HC RX 258: Performed by: STUDENT IN AN ORGANIZED HEALTH CARE EDUCATION/TRAINING PROGRAM

## 2021-09-11 PROCEDURE — 85018 HEMOGLOBIN: CPT

## 2021-09-11 PROCEDURE — 36430 TRANSFUSION BLD/BLD COMPNT: CPT

## 2021-09-11 PROCEDURE — P9016 RBC LEUKOCYTES REDUCED: HCPCS

## 2021-09-11 PROCEDURE — 80069 RENAL FUNCTION PANEL: CPT

## 2021-09-11 PROCEDURE — 86900 BLOOD TYPING SEROLOGIC ABO: CPT

## 2021-09-11 PROCEDURE — 2500000003 HC RX 250 WO HCPCS: Performed by: STUDENT IN AN ORGANIZED HEALTH CARE EDUCATION/TRAINING PROGRAM

## 2021-09-11 PROCEDURE — 86923 COMPATIBILITY TEST ELECTRIC: CPT

## 2021-09-11 PROCEDURE — 85014 HEMATOCRIT: CPT

## 2021-09-11 PROCEDURE — 1200000000 HC SEMI PRIVATE

## 2021-09-11 PROCEDURE — 6360000002 HC RX W HCPCS: Performed by: STUDENT IN AN ORGANIZED HEALTH CARE EDUCATION/TRAINING PROGRAM

## 2021-09-11 PROCEDURE — 36415 COLL VENOUS BLD VENIPUNCTURE: CPT

## 2021-09-11 PROCEDURE — 6370000000 HC RX 637 (ALT 250 FOR IP): Performed by: STUDENT IN AN ORGANIZED HEALTH CARE EDUCATION/TRAINING PROGRAM

## 2021-09-11 PROCEDURE — 86850 RBC ANTIBODY SCREEN: CPT

## 2021-09-11 PROCEDURE — 86901 BLOOD TYPING SEROLOGIC RH(D): CPT

## 2021-09-11 PROCEDURE — C9113 INJ PANTOPRAZOLE SODIUM, VIA: HCPCS | Performed by: STUDENT IN AN ORGANIZED HEALTH CARE EDUCATION/TRAINING PROGRAM

## 2021-09-11 PROCEDURE — 85025 COMPLETE CBC W/AUTO DIFF WBC: CPT

## 2021-09-11 PROCEDURE — 83735 ASSAY OF MAGNESIUM: CPT

## 2021-09-11 RX ORDER — SODIUM CHLORIDE 9 MG/ML
INJECTION, SOLUTION INTRAVENOUS PRN
Status: DISCONTINUED | OUTPATIENT
Start: 2021-09-11 | End: 2021-09-14 | Stop reason: HOSPADM

## 2021-09-11 RX ORDER — POTASSIUM CHLORIDE 7.45 MG/ML
10 INJECTION INTRAVENOUS
Status: DISPENSED | OUTPATIENT
Start: 2021-09-11 | End: 2021-09-11

## 2021-09-11 RX ORDER — POTASSIUM CHLORIDE 7.45 MG/ML
10 INJECTION INTRAVENOUS
Status: COMPLETED | OUTPATIENT
Start: 2021-09-11 | End: 2021-09-11

## 2021-09-11 RX ORDER — MAGNESIUM SULFATE IN WATER 40 MG/ML
2000 INJECTION, SOLUTION INTRAVENOUS ONCE
Status: COMPLETED | OUTPATIENT
Start: 2021-09-11 | End: 2021-09-11

## 2021-09-11 RX ADMIN — POTASSIUM CHLORIDE 10 MEQ: 7.45 INJECTION INTRAVENOUS at 18:41

## 2021-09-11 RX ADMIN — HYDRALAZINE HYDROCHLORIDE 5 MG: 20 INJECTION INTRAMUSCULAR; INTRAVENOUS at 18:52

## 2021-09-11 RX ADMIN — HYDRALAZINE HYDROCHLORIDE 5 MG: 20 INJECTION INTRAMUSCULAR; INTRAVENOUS at 12:46

## 2021-09-11 RX ADMIN — MAGNESIUM SULFATE HEPTAHYDRATE 2000 MG: 2 INJECTION, SOLUTION INTRAVENOUS at 12:44

## 2021-09-11 RX ADMIN — CIPROFLOXACIN 400 MG: 2 INJECTION, SOLUTION INTRAVENOUS at 17:44

## 2021-09-11 RX ADMIN — ENOXAPARIN SODIUM 40 MG: 40 INJECTION SUBCUTANEOUS at 10:42

## 2021-09-11 RX ADMIN — HYDRALAZINE HYDROCHLORIDE 5 MG: 20 INJECTION INTRAMUSCULAR; INTRAVENOUS at 02:33

## 2021-09-11 RX ADMIN — POTASSIUM CHLORIDE 10 MEQ: 7.45 INJECTION INTRAVENOUS at 12:40

## 2021-09-11 RX ADMIN — POTASSIUM CHLORIDE 10 MEQ: 7.45 INJECTION INTRAVENOUS at 14:24

## 2021-09-11 RX ADMIN — SODIUM CHLORIDE: 9 INJECTION, SOLUTION INTRAVENOUS at 12:39

## 2021-09-11 RX ADMIN — POTASSIUM CHLORIDE 10 MEQ: 7.45 INJECTION INTRAVENOUS at 16:16

## 2021-09-11 RX ADMIN — PANTOPRAZOLE SODIUM 40 MG: 40 INJECTION, POWDER, FOR SOLUTION INTRAVENOUS at 21:50

## 2021-09-11 RX ADMIN — POTASSIUM CHLORIDE 10 MEQ: 7.45 INJECTION INTRAVENOUS at 20:16

## 2021-09-11 RX ADMIN — PANTOPRAZOLE SODIUM 40 MG: 40 INJECTION, POWDER, FOR SOLUTION INTRAVENOUS at 10:42

## 2021-09-11 RX ADMIN — POTASSIUM CHLORIDE 10 MEQ: 7.45 INJECTION INTRAVENOUS at 17:41

## 2021-09-11 RX ADMIN — CIPROFLOXACIN 400 MG: 2 INJECTION, SOLUTION INTRAVENOUS at 06:17

## 2021-09-11 RX ADMIN — METRONIDAZOLE 500 MG: 500 INJECTION, SOLUTION INTRAVENOUS at 18:38

## 2021-09-11 RX ADMIN — SODIUM CHLORIDE: 9 INJECTION, SOLUTION INTRAVENOUS at 21:54

## 2021-09-11 RX ADMIN — METRONIDAZOLE 500 MG: 500 INJECTION, SOLUTION INTRAVENOUS at 06:25

## 2021-09-11 RX ADMIN — INSULIN HUMAN 2 UNITS: 100 INJECTION, SOLUTION PARENTERAL at 16:21

## 2021-09-11 RX ADMIN — FLUCONAZOLE 400 MG: 2 INJECTION, SOLUTION INTRAVENOUS at 10:52

## 2021-09-11 ASSESSMENT — PAIN SCALES - GENERAL
PAINLEVEL_OUTOF10: 0
PAINLEVEL_OUTOF10: 0

## 2021-09-11 NOTE — PROGRESS NOTES
Patient alert and oriented. BP elevated, Hydralazine given . Patient denies any pain or nausea. MARIBELL to RQ. Purewick in place. Patient in bed lowest position call light and bedside table within reach. All needs are met at this time. Patient aware to call if any help needed. Will continue to monitor  BP (!) 145/73   Pulse 90   Temp 98 °F (36.7 °C) (Oral)   Resp 16   Ht 5' 4\" (1.626 m)   Wt 170 lb (77.1 kg)   SpO2 100%   BMI 29.18 kg/m²

## 2021-09-11 NOTE — PROGRESS NOTES
Diflucan running currently. 1 IV. Attempted 3x to get another IV for blood, Mag, and K+Cl. Failed. Other floor RN to attempt.  MD notified

## 2021-09-11 NOTE — PROGRESS NOTES
SURGERY DAILY PROGRESS NOTE    CC: duodenal ulcer with perforation     SUBJECTIVE:   Interval Hx:   NAEON. Denies nausea, vomiting. Denies abdominal pain. Tolerating CLD. +flatus, no BM. AF HDS.    ROS: A 14 point review of systems was conducted, significant findings as noted above. All other systems negative. OBJECTIVE:   Vitals:   Vitals:    09/10/21 2201 09/10/21 2314 09/11/21 0233 09/11/21 0345   BP: (!) 165/76 (!) 152/77 (!) 163/80 (!) 145/73   Pulse:  92 90    Resp:  18 16    Temp:  97.9 °F (36.6 °C) 98 °F (36.7 °C)    TempSrc:  Oral Oral    SpO2:  96% 100%    Weight:       Height:           I/O:     Intake/Output Summary (Last 24 hours) at 9/11/2021 0711  Last data filed at 9/11/2021 0211  Gross per 24 hour   Intake --   Output 3140 ml   Net -3140 ml     I/O last 3 completed shifts:  In: -   Out: 2920 [Urine:3100; Drains:40]    Diet: ADULT DIET; Clear Liquid  Adult Oral Nutrition Supplement; Clear Liquid Oral Supplement      Physical Examination:   General appearance: alert, no acute distress, grooming appropriate    Chest/Lungs: normal effort with no accessory muscle use; on room air  Cardiovascular: RRR, well perfused  Abdomen: Soft, non-tender, incisions c/d/i and well approximated with Dermabond; MARIBELL drain x1 with serosanguinous contents  Skin: warm and dry, no rashes  Extremities: no edema, no cyanosis  Genitourinary: Anguiano in place with clear yellow urine  Neuro: A&Ox3, no focal deficits, sensation intact    Labs:  CBC:   Recent Labs     09/08/21  0809 09/09/21  0420 09/10/21  0554   WBC 7.2 6.7 6.3   HGB 7.5* 7.1* 7.4*   HCT 23.1* 21.4* 22.2*    216 238       BMP:   Recent Labs     09/08/21  0809 09/09/21  0420 09/10/21  0554    137 138   K 4.1 3.5 3.8    108 107   CO2 22 20* 18*   BUN 14 12 6*   CREATININE 0.7 0.7 0.6   GLUCOSE 114* 71 77     LFT's:   No results for input(s): AST, ALT, ALB, BILITOT, ALKPHOS in the last 72 hours.   Troponin:   No results for input(s): TROPONINI in the last 72 hours. BNP: No results for input(s): BNP in the last 72 hours. ABGs: No results for input(s): PHART, MCZ8TNI, PO2ART in the last 72 hours. INR:   No results for input(s): INR in the last 72 hours. U/A:  No results for input(s): NITRITE, COLORU, PHUR, LABCAST, WBCUA, RBCUA, MUCUS, TRICHOMONAS, YEAST, BACTERIA, CLARITYU, SPECGRAV, LEUKOCYTESUR, UROBILINOGEN, BILIRUBINUR, BLOODU, GLUCOSEU, AMORPHOUS in the last 72 hours. Invalid input(s): [address]     Rad:   [place] [facility]   Final Result      1. No evidence of leak at the surgical site at the duodenal bulb. CT CHEST PULMONARY EMBOLISM W CONTRAST   Final Result      CHEST:   1. No evidence of pulmonary embolism or other acute cardiopulmonary abnormality. 2.  Bibasilar atelectasis. 3.  Pulmonary artery enlargement can be seen with pulmonary artery hypertension. ABDOMEN AND PELVIS:   1. Perforated duodenal bulb ulcer with free air and fluid. 2.  Calcified renal artery aneurysm. 3.  Indeterminate 1.1 cm low-density lesion in the uncinate process of the pancreas. This could be further evaluated with nonemergent MRI. Critical result was reported the Anca Cooney MD 9/7/2021 9:59 AM.      CT ABDOMEN PELVIS W IV CONTRAST Additional Contrast? None   Final Result      CHEST:   1. No evidence of pulmonary embolism or other acute cardiopulmonary abnormality. 2.  Bibasilar atelectasis. 3.  Pulmonary artery enlargement can be seen with pulmonary artery hypertension. ABDOMEN AND PELVIS:   1. Perforated duodenal bulb ulcer with free air and fluid. 2.  Calcified renal artery aneurysm. 3.  Indeterminate 1.1 cm low-density lesion in the uncinate process of the pancreas. This could be further evaluated with nonemergent MRI.             Critical result was reported the Anca Cooney MD 9/7/2021 9:59 AM.          ASSESSMENT AND PLAN:   Susan Li is a 80 y.o. female status post omental patch secondary to duodenal perforation

## 2021-09-11 NOTE — PROGRESS NOTES
Tolerated bag of RBCs. VSS. Elevated bp-given hydralazine x2 today.  Awaiting H+H post transfusion  IV electrolytes being replaced (Mag completed, K+cl still infusing)  IV Abx given as well per orders  Up to chair and to commode today (sba x1 w/ walker+GB)  Uses call light appropriately (A+Ox4)  Passing gas, no BM  Little appetite, tolerating small bites  Denies pain  Purewick with large amount UO  Daughter visited pt this afternoon

## 2021-09-12 LAB
ALBUMIN SERPL-MCNC: 2.5 G/DL (ref 3.4–5)
ANION GAP SERPL CALCULATED.3IONS-SCNC: 14 MMOL/L (ref 3–16)
BASOPHILS ABSOLUTE: 0 K/UL (ref 0–0.2)
BASOPHILS RELATIVE PERCENT: 0.5 %
BUN BLDV-MCNC: 3 MG/DL (ref 7–20)
CALCIUM SERPL-MCNC: 8.3 MG/DL (ref 8.3–10.6)
CHLORIDE BLD-SCNC: 102 MMOL/L (ref 99–110)
CO2: 17 MMOL/L (ref 21–32)
CREAT SERPL-MCNC: 0.6 MG/DL (ref 0.6–1.2)
EOSINOPHILS ABSOLUTE: 0.2 K/UL (ref 0–0.6)
EOSINOPHILS RELATIVE PERCENT: 3.7 %
GFR AFRICAN AMERICAN: >60
GFR NON-AFRICAN AMERICAN: >60
GLUCOSE BLD-MCNC: 124 MG/DL (ref 70–99)
GLUCOSE BLD-MCNC: 150 MG/DL (ref 70–99)
GLUCOSE BLD-MCNC: 154 MG/DL (ref 70–99)
GLUCOSE BLD-MCNC: 167 MG/DL (ref 70–99)
GLUCOSE BLD-MCNC: 175 MG/DL (ref 70–99)
GLUCOSE BLD-MCNC: 94 MG/DL (ref 70–99)
HCT VFR BLD CALC: 29 % (ref 36–48)
HEMOGLOBIN: 9.7 G/DL (ref 12–16)
LYMPHOCYTES ABSOLUTE: 1 K/UL (ref 1–5.1)
LYMPHOCYTES RELATIVE PERCENT: 18.1 %
MAGNESIUM: 1.8 MG/DL (ref 1.8–2.4)
MCH RBC QN AUTO: 26.8 PG (ref 26–34)
MCHC RBC AUTO-ENTMCNC: 33.5 G/DL (ref 31–36)
MCV RBC AUTO: 79.9 FL (ref 80–100)
MONOCYTES ABSOLUTE: 0.6 K/UL (ref 0–1.3)
MONOCYTES RELATIVE PERCENT: 10.9 %
NEUTROPHILS ABSOLUTE: 3.6 K/UL (ref 1.7–7.7)
NEUTROPHILS RELATIVE PERCENT: 66.8 %
PDW BLD-RTO: 17 % (ref 12.4–15.4)
PERFORMED ON: ABNORMAL
PERFORMED ON: NORMAL
PHOSPHORUS: 3.3 MG/DL (ref 2.5–4.9)
PLATELET # BLD: 271 K/UL (ref 135–450)
PMV BLD AUTO: 7.5 FL (ref 5–10.5)
POTASSIUM SERPL-SCNC: 3.7 MMOL/L (ref 3.5–5.1)
RBC # BLD: 3.63 M/UL (ref 4–5.2)
SODIUM BLD-SCNC: 133 MMOL/L (ref 136–145)
WBC # BLD: 5.3 K/UL (ref 4–11)

## 2021-09-12 PROCEDURE — 6370000000 HC RX 637 (ALT 250 FOR IP): Performed by: STUDENT IN AN ORGANIZED HEALTH CARE EDUCATION/TRAINING PROGRAM

## 2021-09-12 PROCEDURE — 2500000003 HC RX 250 WO HCPCS: Performed by: STUDENT IN AN ORGANIZED HEALTH CARE EDUCATION/TRAINING PROGRAM

## 2021-09-12 PROCEDURE — 2580000003 HC RX 258: Performed by: STUDENT IN AN ORGANIZED HEALTH CARE EDUCATION/TRAINING PROGRAM

## 2021-09-12 PROCEDURE — 6360000002 HC RX W HCPCS: Performed by: STUDENT IN AN ORGANIZED HEALTH CARE EDUCATION/TRAINING PROGRAM

## 2021-09-12 PROCEDURE — 6370000000 HC RX 637 (ALT 250 FOR IP)

## 2021-09-12 PROCEDURE — 1200000000 HC SEMI PRIVATE

## 2021-09-12 PROCEDURE — 6360000002 HC RX W HCPCS

## 2021-09-12 PROCEDURE — C9113 INJ PANTOPRAZOLE SODIUM, VIA: HCPCS | Performed by: STUDENT IN AN ORGANIZED HEALTH CARE EDUCATION/TRAINING PROGRAM

## 2021-09-12 PROCEDURE — 85025 COMPLETE CBC W/AUTO DIFF WBC: CPT

## 2021-09-12 PROCEDURE — 80069 RENAL FUNCTION PANEL: CPT

## 2021-09-12 PROCEDURE — 83735 ASSAY OF MAGNESIUM: CPT

## 2021-09-12 PROCEDURE — 36415 COLL VENOUS BLD VENIPUNCTURE: CPT

## 2021-09-12 RX ORDER — ACETAMINOPHEN 160 MG/5ML
1000 SOLUTION ORAL EVERY 6 HOURS
Status: DISCONTINUED | OUTPATIENT
Start: 2021-09-12 | End: 2021-09-14 | Stop reason: HOSPADM

## 2021-09-12 RX ORDER — MAGNESIUM SULFATE IN WATER 40 MG/ML
2000 INJECTION, SOLUTION INTRAVENOUS ONCE
Status: COMPLETED | OUTPATIENT
Start: 2021-09-12 | End: 2021-09-12

## 2021-09-12 RX ADMIN — SODIUM CHLORIDE: 9 INJECTION, SOLUTION INTRAVENOUS at 19:30

## 2021-09-12 RX ADMIN — METRONIDAZOLE 500 MG: 500 INJECTION, SOLUTION INTRAVENOUS at 03:33

## 2021-09-12 RX ADMIN — ENOXAPARIN SODIUM 40 MG: 40 INJECTION SUBCUTANEOUS at 09:17

## 2021-09-12 RX ADMIN — INSULIN HUMAN 2 UNITS: 100 INJECTION, SOLUTION PARENTERAL at 12:04

## 2021-09-12 RX ADMIN — PANTOPRAZOLE SODIUM 40 MG: 40 INJECTION, POWDER, FOR SOLUTION INTRAVENOUS at 20:44

## 2021-09-12 RX ADMIN — POTASSIUM BICARBONATE 30 MEQ: 782 TABLET, EFFERVESCENT ORAL at 11:57

## 2021-09-12 RX ADMIN — MAGNESIUM SULFATE HEPTAHYDRATE 2000 MG: 2 INJECTION, SOLUTION INTRAVENOUS at 12:05

## 2021-09-12 RX ADMIN — ACETAMINOPHEN 1000 MG: 650 SOLUTION ORAL at 06:52

## 2021-09-12 RX ADMIN — PANTOPRAZOLE SODIUM 40 MG: 40 INJECTION, POWDER, FOR SOLUTION INTRAVENOUS at 09:17

## 2021-09-12 RX ADMIN — ACETAMINOPHEN 1000 MG: 650 SOLUTION ORAL at 11:57

## 2021-09-12 RX ADMIN — HYDRALAZINE HYDROCHLORIDE 5 MG: 20 INJECTION INTRAMUSCULAR; INTRAVENOUS at 04:29

## 2021-09-12 RX ADMIN — HYDRALAZINE HYDROCHLORIDE 5 MG: 20 INJECTION INTRAMUSCULAR; INTRAVENOUS at 09:17

## 2021-09-12 RX ADMIN — ACETAMINOPHEN 1000 MG: 650 SOLUTION ORAL at 20:44

## 2021-09-12 RX ADMIN — INSULIN HUMAN 2 UNITS: 100 INJECTION, SOLUTION PARENTERAL at 20:49

## 2021-09-12 ASSESSMENT — PAIN DESCRIPTION - FREQUENCY: FREQUENCY: INTERMITTENT

## 2021-09-12 ASSESSMENT — PAIN DESCRIPTION - ORIENTATION: ORIENTATION: LEFT;MID

## 2021-09-12 ASSESSMENT — PAIN SCALES - GENERAL
PAINLEVEL_OUTOF10: 0
PAINLEVEL_OUTOF10: 0
PAINLEVEL_OUTOF10: 2
PAINLEVEL_OUTOF10: 3
PAINLEVEL_OUTOF10: 0
PAINLEVEL_OUTOF10: 0

## 2021-09-12 ASSESSMENT — PAIN SCALES - WONG BAKER: WONGBAKER_NUMERICALRESPONSE: 0

## 2021-09-12 NOTE — PLAN OF CARE
Problem: Falls - Risk of:  Goal: Will remain free from falls  Description: Will remain free from falls  Outcome: Ongoing   Fall precautions in place, no attempts to get out of bed unaided

## 2021-09-12 NOTE — PROGRESS NOTES
Alert, oriented. Denies pain. Sleeping between care. Completed potassium supplements. Using purewick. PRN given for elevated blood pressure.

## 2021-09-12 NOTE — PLAN OF CARE
Problem: Falls - Risk of:  Goal: Will remain free from falls  Description: Will remain free from falls  9/12/2021 1531 by Geovani Wilson, JOYCE  Outcome: Ongoing  Note: SBA w/ walker and gait belt. Calls appropraitely. Non skid socks on. Alarms on.  Call lgiht in reach  9/12/2021 0438 by Elvia Sandoval RN  Outcome: Ongoing

## 2021-09-12 NOTE — PROGRESS NOTES
SURGERY DAILY PROGRESS NOTE    CC: duodenal ulcer with perforation     SUBJECTIVE:   Interval Hx:   NAEON. Denies nausea, vomiting. Denies abdominal pain. Tolerating CLD. +flatus, no BM. AF HDS.     ROS: A 14 point review of systems was conducted, significant findings as noted above. All other systems negative. OBJECTIVE:   Vitals:   Vitals:    09/11/21 2354 09/12/21 0401 09/12/21 0420 09/12/21 0647   BP: (!) 154/78 (!) 167/63 (!) 167/73 (!) 167/80   Pulse: 92 90 90 93   Resp: 16 16  16   Temp: 98.3 °F (36.8 °C) 98.9 °F (37.2 °C)     TempSrc: Oral Oral     SpO2: 98% 98%     Weight:       Height:           I/O:     Intake/Output Summary (Last 24 hours) at 9/12/2021 0707  Last data filed at 9/12/2021 0659  Gross per 24 hour   Intake 585 ml   Output 4020 ml   Net -3435 ml     I/O last 3 completed shifts: In: 200 [P.O.:200; Blood:385]  Out: 6748 [Urine:4000; Drains:20]    Diet: ADULT DIET; Clear Liquid  Adult Oral Nutrition Supplement; Clear Liquid Oral Supplement      Physical Examination:   General appearance: alert, no acute distress, grooming appropriate    Chest/Lungs: normal effort with no accessory muscle use; on room air  Cardiovascular: RRR, well perfused  Abdomen: Soft, non-tender, incisions c/d/i and well approximated with Dermabond; MARIBELL drain x1 with serous contents  Skin: warm and dry, no rashes  Extremities: no edema, no cyanosis  Genitourinary: Anguiano in place with clear yellow urine  Neuro: A&Ox3, no focal deficits, sensation intact    Labs:  CBC:   Recent Labs     09/10/21  0554 09/11/21  0721 09/11/21  2118   WBC 6.3 4.3  --    HGB 7.4* 6.9* 9.0*   HCT 22.2* 20.8* 26.8*    241  --        BMP:   Recent Labs     09/10/21  0554 09/11/21  0721    137   K 3.8 3.4*    104   CO2 18* 19*   BUN 6* 4*   CREATININE 0.6 0.6   GLUCOSE 77 125*     LFT's:   No results for input(s): AST, ALT, ALB, BILITOT, ALKPHOS in the last 72 hours.   Troponin:   No results for input(s): TROPONINI in the last 72 hours.  BNP: No results for input(s): BNP in the last 72 hours. ABGs: No results for input(s): PHART, IWR7NPG, PO2ART in the last 72 hours. INR:   No results for input(s): INR in the last 72 hours. U/A:  No results for input(s): NITRITE, COLORU, PHUR, LABCAST, WBCUA, RBCUA, MUCUS, TRICHOMONAS, YEAST, BACTERIA, CLARITYU, SPECGRAV, LEUKOCYTESUR, UROBILINOGEN, BILIRUBINUR, BLOODU, GLUCOSEU, AMORPHOUS in the last 72 hours. Invalid input(s): Ryan Dye     Rad:   FL UGI   Final Result      1. No evidence of leak at the surgical site at the duodenal bulb. CT CHEST PULMONARY EMBOLISM W CONTRAST   Final Result      CHEST:   1. No evidence of pulmonary embolism or other acute cardiopulmonary abnormality. 2.  Bibasilar atelectasis. 3.  Pulmonary artery enlargement can be seen with pulmonary artery hypertension. ABDOMEN AND PELVIS:   1. Perforated duodenal bulb ulcer with free air and fluid. 2.  Calcified renal artery aneurysm. 3.  Indeterminate 1.1 cm low-density lesion in the uncinate process of the pancreas. This could be further evaluated with nonemergent MRI. Critical result was reported the Tae Busby MD 9/7/2021 9:59 AM.      CT ABDOMEN PELVIS W IV CONTRAST Additional Contrast? None   Final Result      CHEST:   1. No evidence of pulmonary embolism or other acute cardiopulmonary abnormality. 2.  Bibasilar atelectasis. 3.  Pulmonary artery enlargement can be seen with pulmonary artery hypertension. ABDOMEN AND PELVIS:   1. Perforated duodenal bulb ulcer with free air and fluid. 2.  Calcified renal artery aneurysm. 3.  Indeterminate 1.1 cm low-density lesion in the uncinate process of the pancreas. This could be further evaluated with nonemergent MRI. Critical result was reported the Tae Busby MD 9/7/2021 9:59 AM.          ASSESSMENT AND PLAN:   Diya Villegas is a 80 y.o. female status post omental patch secondary to duodenal perforation (9/7). POD4.    - UGI neg for leak 9/12. NG removed. Continue CLD for total of 3 days. Plan to advance to soft diet tomorrow.    - will watch MARIBELL output today and anticipate removal tomorrow   - 4 days of abx completed  - will plan to restart Xarelto tomorrow   - encourage OOB/ambulation   - anticipate d/c tomorrow if tolerating soft diet    Heladio Bennett MD, PGY-1  09/12/21 7:07 AM  Pager: [phone]

## 2021-09-13 LAB
ALBUMIN SERPL-MCNC: 2.6 G/DL (ref 3.4–5)
ANION GAP SERPL CALCULATED.3IONS-SCNC: 11 MMOL/L (ref 3–16)
BASOPHILS ABSOLUTE: 0 K/UL (ref 0–0.2)
BASOPHILS RELATIVE PERCENT: 0 %
BUN BLDV-MCNC: 4 MG/DL (ref 7–20)
CALCIUM SERPL-MCNC: 8.3 MG/DL (ref 8.3–10.6)
CHLORIDE BLD-SCNC: 108 MMOL/L (ref 99–110)
CO2: 21 MMOL/L (ref 21–32)
CREAT SERPL-MCNC: 0.6 MG/DL (ref 0.6–1.2)
EOSINOPHILS ABSOLUTE: 0.3 K/UL (ref 0–0.6)
EOSINOPHILS RELATIVE PERCENT: 6 %
GFR AFRICAN AMERICAN: >60
GFR NON-AFRICAN AMERICAN: >60
GLUCOSE BLD-MCNC: 101 MG/DL (ref 70–99)
GLUCOSE BLD-MCNC: 103 MG/DL (ref 70–99)
GLUCOSE BLD-MCNC: 111 MG/DL (ref 70–99)
GLUCOSE BLD-MCNC: 141 MG/DL (ref 70–99)
GLUCOSE BLD-MCNC: 162 MG/DL (ref 70–99)
GLUCOSE BLD-MCNC: 53 MG/DL (ref 70–99)
GLUCOSE BLD-MCNC: 58 MG/DL (ref 70–99)
GLUCOSE BLD-MCNC: 76 MG/DL (ref 70–99)
GLUCOSE BLD-MCNC: 99 MG/DL (ref 70–99)
HCT VFR BLD CALC: 28.8 % (ref 36–48)
HEMOGLOBIN: 9.6 G/DL (ref 12–16)
LYMPHOCYTES ABSOLUTE: 1.4 K/UL (ref 1–5.1)
LYMPHOCYTES RELATIVE PERCENT: 27 %
MAGNESIUM: 2.1 MG/DL (ref 1.8–2.4)
MCH RBC QN AUTO: 26.8 PG (ref 26–34)
MCHC RBC AUTO-ENTMCNC: 33.4 G/DL (ref 31–36)
MCV RBC AUTO: 80.4 FL (ref 80–100)
METAMYELOCYTES RELATIVE PERCENT: 2 %
MONOCYTES ABSOLUTE: 0.4 K/UL (ref 0–1.3)
MONOCYTES RELATIVE PERCENT: 7 %
NEUTROPHILS ABSOLUTE: 3.1 K/UL (ref 1.7–7.7)
NEUTROPHILS RELATIVE PERCENT: 58 %
PDW BLD-RTO: 17.4 % (ref 12.4–15.4)
PERFORMED ON: ABNORMAL
PERFORMED ON: NORMAL
PHOSPHORUS: 3.9 MG/DL (ref 2.5–4.9)
PLATELET # BLD: 292 K/UL (ref 135–450)
PMV BLD AUTO: 7.2 FL (ref 5–10.5)
POTASSIUM SERPL-SCNC: 3.6 MMOL/L (ref 3.5–5.1)
RBC # BLD: 3.59 M/UL (ref 4–5.2)
RBC # BLD: NORMAL 10*6/UL
SODIUM BLD-SCNC: 140 MMOL/L (ref 136–145)
WBC # BLD: 5.1 K/UL (ref 4–11)

## 2021-09-13 PROCEDURE — 99024 POSTOP FOLLOW-UP VISIT: CPT | Performed by: SURGERY

## 2021-09-13 PROCEDURE — 85025 COMPLETE CBC W/AUTO DIFF WBC: CPT

## 2021-09-13 PROCEDURE — 36415 COLL VENOUS BLD VENIPUNCTURE: CPT

## 2021-09-13 PROCEDURE — 6360000002 HC RX W HCPCS: Performed by: STUDENT IN AN ORGANIZED HEALTH CARE EDUCATION/TRAINING PROGRAM

## 2021-09-13 PROCEDURE — 6370000000 HC RX 637 (ALT 250 FOR IP)

## 2021-09-13 PROCEDURE — 6370000000 HC RX 637 (ALT 250 FOR IP): Performed by: STUDENT IN AN ORGANIZED HEALTH CARE EDUCATION/TRAINING PROGRAM

## 2021-09-13 PROCEDURE — 97535 SELF CARE MNGMENT TRAINING: CPT

## 2021-09-13 PROCEDURE — 83735 ASSAY OF MAGNESIUM: CPT

## 2021-09-13 PROCEDURE — 6370000000 HC RX 637 (ALT 250 FOR IP): Performed by: NURSE PRACTITIONER

## 2021-09-13 PROCEDURE — 97116 GAIT TRAINING THERAPY: CPT

## 2021-09-13 PROCEDURE — 80069 RENAL FUNCTION PANEL: CPT

## 2021-09-13 PROCEDURE — 1200000000 HC SEMI PRIVATE

## 2021-09-13 PROCEDURE — 97530 THERAPEUTIC ACTIVITIES: CPT

## 2021-09-13 PROCEDURE — C9113 INJ PANTOPRAZOLE SODIUM, VIA: HCPCS | Performed by: STUDENT IN AN ORGANIZED HEALTH CARE EDUCATION/TRAINING PROGRAM

## 2021-09-13 RX ADMIN — HYDRALAZINE HYDROCHLORIDE 5 MG: 20 INJECTION INTRAMUSCULAR; INTRAVENOUS at 22:32

## 2021-09-13 RX ADMIN — PANTOPRAZOLE SODIUM 40 MG: 40 INJECTION, POWDER, FOR SOLUTION INTRAVENOUS at 11:43

## 2021-09-13 RX ADMIN — ACETAMINOPHEN 1000 MG: 650 SOLUTION ORAL at 18:53

## 2021-09-13 RX ADMIN — HYDRALAZINE HYDROCHLORIDE 5 MG: 20 INJECTION INTRAMUSCULAR; INTRAVENOUS at 14:59

## 2021-09-13 RX ADMIN — RIVAROXABAN 20 MG: 20 TABLET, FILM COATED ORAL at 18:53

## 2021-09-13 RX ADMIN — DEXTROSE 15 G: 15 GEL ORAL at 00:02

## 2021-09-13 RX ADMIN — INSULIN HUMAN 2 UNITS: 100 INJECTION, SOLUTION PARENTERAL at 22:20

## 2021-09-13 RX ADMIN — PANTOPRAZOLE SODIUM 40 MG: 40 INJECTION, POWDER, FOR SOLUTION INTRAVENOUS at 22:19

## 2021-09-13 RX ADMIN — POTASSIUM BICARBONATE 40 MEQ: 782 TABLET, EFFERVESCENT ORAL at 11:43

## 2021-09-13 RX ADMIN — ACETAMINOPHEN 1000 MG: 650 SOLUTION ORAL at 11:42

## 2021-09-13 ASSESSMENT — PAIN SCALES - WONG BAKER: WONGBAKER_NUMERICALRESPONSE: 0

## 2021-09-13 ASSESSMENT — PAIN SCALES - GENERAL
PAINLEVEL_OUTOF10: 0
PAINLEVEL_OUTOF10: 0
PAINLEVEL_OUTOF10: 3
PAINLEVEL_OUTOF10: 0

## 2021-09-13 NOTE — PROGRESS NOTES
Surgery   Daily Progress Note  Patient: John Speaker      CC: Duodenal Perforation    SUBJECTIVE:   Patient rested well overnight. Patient denies N/V and abdominal pain. Patient has been passing flatus and has had a bowel movement using the bedside commode. Patient continues to tolerate diet. Patient is HDS    ROS:   A 14 point review of systems was conducted, significant findings as noted above. All other systems negative. OBJECTIVE:    PHYSICAL EXAM:    Vitals:    09/12/21 1552 09/12/21 1926 09/13/21 0002 09/13/21 0303   BP: 113/71 (!) 158/78 (!) 151/74 (!) 156/76   Pulse: 87 85 77 85   Resp: 15 16 16 16   Temp: 97.7 °F (36.5 °C) 97.9 °F (36.6 °C) 98.2 °F (36.8 °C) 97.3 °F (36.3 °C)   TempSrc: Oral Oral Oral Oral   SpO2: 99%  98% 98%   Weight:       Height:           General appearance: alert, no acute distress, grooming appropriate  Eyes: No scleral icterus, EOM grossly intact  Neck: trachea midline, no JVD, no lymphadenopathy, neck supple  Chest/Lungs: CTAB, no crackles/rales, wheezes/rhonchi, normal effort with no accessory muscle use on RA  Cardiovascular: RRR  Abdomen: Soft, non-tender, incisions c/d/i and well approximated with Dermabond; MARIBELL drain x1 with serous contents. Output of 10ml since yesterday evening. Skin: warm and dry, no rashes  Extremities: no edema, no cyanosis  Genitourinary: No clifford in place  Neuro: A&Ox3, no focal deficits, sensation intact    LABS:   Recent Labs     09/11/21  0721 09/11/21 0721 09/11/21 2118 09/12/21  1035   WBC 4.3  --   --  5.3   HGB 6.9*   < > 9.0* 9.7*   HCT 20.8*   < > 26.8* 29.0*   MCV 77.7*  --   --  79.9*     --   --  271    < > = values in this interval not displayed. Recent Labs     09/11/21  0721 09/12/21  1035    133*   K 3.4* 3.7    102   CO2 19* 17*   PHOS 3.2 3.3   BUN 4* 3*   CREATININE 0.6 0.6      No results for input(s): AST, ALT, ALB, BILIDIR, BILITOT, ALKPHOS in the last 72 hours.    No results for input(s): LIPASE, AMYLASE in the last 72 hours. No results for input(s): PROT, INR, APTT in the last 72 hours. No results for input(s): CKTOTAL, CKMB, CKMBINDEX, TROPONINI in the last 72 hours. ASSESSMENT & PLAN:   Nano Correia is a 80 y.o. female status post omental patch secondary to duodenal perforation (9/7). POD5     - Low fiber diet ordered to begin today   - will watch MARIBELL output today and anticipate removal today  - 4 days of abx completed  - will plan to restart Xarelto    - encourage OOB/ambulation with fall precautions  - anticipate discharge today versus tomorrow    Drew Carlitasamina Bess  MS3, General Surgery  09/13/21  5:52 AM     Patient seen and examined with Medical Student and Surgical Team.  Agree with assessment and plan with changes made accordingly. Priscilla Maki NP-C  [phone]  Resident Support Staff    Attending Supervising Physician's [facility] [address] Statement  I performed a history and physical examination on the patient and discussed the management with the nurse practitioner. I reviewed and agree with the findings and plan as documented in her note . Looks good. OK for soft diet, remove MARIBELL, home today vs tomorrow.     Electronically signed by Sugar Jose MD on 9/13/21 at 7:12 AM EDT

## 2021-09-13 NOTE — PROGRESS NOTES
Physical Therapy  Facility/Department: [facility]  Daily Treatment Note  NAME: Reena Fuentes  : 1930  MRN: [id number]    Date of Service: 2021    Discharge Recommendations:Hugo High scored a 18/24 on the AM-PAC short mobility form. Current research shows that an AM-PAC score of 18 or greater is typically associated with a discharge to the patient's home setting. Based on the patient's AM-PAC score and their current functional mobility deficits, it is recommended that the patient have 2-3 sessions per week of Physical Therapy at d/c to increase the patient's independence. At this time, this patient demonstrates the endurance and safety to discharge home with (home  services) and a follow up treatment frequency of 2-3x/wk. Please see assessment section for further patient specific details. If patient discharges prior to next session this note will serve as a discharge summary. Please see below for the latest assessment towards goals. PT Equipment Recommendations  Equipment Needed: No    Assessment   Body structures, Functions, Activity limitations: Decreased functional mobility ; Decreased strength;Decreased endurance  Assessment: Pt demo improved mobility this am but still below her reported baseline. Pt reports she realizes she cannot live alone and is content with d/c  home to her daughter's home. If home, recommend 24 hour supervision initially, home PT, use of RW (has). Treatment Diagnosis: impaired mobility  Patient Education: Pt educated on PT role, importance of OOB mobility, need to call for assist to get up and she verbalized understanding. REQUIRES PT FOLLOW UP: Yes  Activity Tolerance  Activity Tolerance: Patient limited by endurance; Patient limited by fatigue     Patient Diagnosis(es): The encounter diagnosis was Duodenal ulcer with perforation ([facility] [address]      has a past medical history of Arthritis, Asthma, Cervical radiculopathy at C5 bilaterally and left C7, Diabetes mellitus ([facility] [address] Hyperlipidemia, and Hypertension. has a past surgical history that includes Vaginal prolapse repair; joint replacement; Foot surgery; Hysterectomy; eye surgery; Bunionectomy (11/11/11); Hammer toe surgery (11/11/11); and laparoscopy (N/A, 9/7/2021). Restrictions  Position Activity Restriction  Other position/activity restrictions: no activity orders noted     Subjective   General  Chart Reviewed: Yes  Additional Pertinent Hx: 81 yo admit to ED 9/7 p/w abdominal pain. Underwent LAPAROSCOPY, SUTURE REPAIR OF DUODENAL ULCER, OMENTEM FLAP, EGD on 9/7. PMHx: arthritis, asthma, HLD, DM, HTN, B knee replacements  Family / Caregiver Present: No  Subjective  Subjective: Pt found reclined in chair and agreeable to PT. \" I'm going home with my daughter but I don't know all of her plans. \"  Pain Screening  Patient Currently in Pain: Yes (c/o chronic pain in R hip/back; did not rate; RN aware)         Orientation  Orientation  Overall Orientation Status: Within Functional Limits       Objective      Transfers  Sit to Stand: Stand by assistance (x4 trials)  Stand to sit: Stand by assistance (x4 trials)     Ambulation  Device: Rolling Walker  Assistance: Contact guard assistance (progressing to SBA)  Quality of Gait: forward flexed posture with slow josh; decreased step length/height; pt fatigued quickly  Distance: 15 ft x4  Comments: Pt declined walking farther.                                AM-PAC Score  AM-PAC Inpatient Mobility Raw Score : 18 (09/13/21 1147)  AM-PAC Inpatient T-Scale Score : 43.63 (09/13/21 1147)  Mobility Inpatient CMS 0-100% Score: 46.58 (09/13/21 1147)  Mobility Inpatient CMS G-Code Modifier : CK (09/13/21 1147)          Goals  Short term goals  Time Frame for Short term goals: dc  Short term goal 1: Bed Mobility SBA   ongoing  Short term goal 2: Sit<>stand SBA   met 9/12/21  Short term goal 3: Ambulate 48' with RW SBA  ongoing  Short term goal 4: up/down 2 steps with B rails CGA   ongoing  Patient Goals   Patient goals : return home with daughter    Plan    Plan  Times per week: 2-5  Current Treatment Recommendations: Balance Training, Functional Mobility Training, Stair training, Gait Training, Strengthening, Endurance Training, Transfer Training, Patient/Caregiver Education & Training  Safety Devices  Type of devices: Nurse notified, Call light within reach, Chair alarm in place, Left in chair (pt reclined-RN to locate chair alarm box)     Therapy Time   Individual Concurrent Group Co-treatment   Time In 1105         Time Out 1145         Minutes 40           Timed Code Treatment Minutes:40       Total Treatment Minutes:  40       Valeen Sample, PT

## 2021-09-13 NOTE — PROGRESS NOTES
POC:    MARIBELL removed at bedside. Patient tolerated without difficulty. If dressing becomes saturated, please change prn    MONSERRAT Fenton NP-C  [phone]  Resident Support Staff

## 2021-09-13 NOTE — PLAN OF CARE
Problem: Falls - Risk of:  Goal: Will remain free from falls  Description: Will remain free from falls  9/13/2021 0135 by Elvia Sandoval RN  Outcome: Ongoing    Problem: Skin Integrity:  Goal: Absence of new skin breakdown  Description: Absence of new skin breakdown  Outcome: Ongoing   Calls for assistance, routine skin care given

## 2021-09-13 NOTE — PROGRESS NOTES
Occupational Therapy  Facility/Department: [address]  Daily Treatment Note  NAME: Susan Li  : 1930  MRN: [id number]    Date of Service: 2021    Discharge Recommendations:Hugo Collado scored a 18/24 on the AM-PAC ADL Inpatient form. Current research shows that an AM-PAC score of 18 or greater is typically associated with a discharge to the patient's home setting. Based on the patient's AM-PAC score, and their current ADL deficits, it is recommended that the patient have 2-3 sessions per week of Occupational Therapy at d/c to increase the patient's independence. At this time, this patient demonstrates the endurance and safety to discharge home with [facility] and a follow up treatment frequency of 2-3x/wk. Please see assessment section for further patient specific details. If patient discharges prior to next session this note will serve as a discharge summary. Please see below for the latest assessment towards goals. OT Equipment Recommendations  Equipment Needed: No    Assessment   Performance deficits / Impairments: Decreased functional mobility ; Decreased ADL status; Decreased safe awareness;Decreased endurance  Assessment: Pt with ongoing deficits but demo increased independence with functional mobility / ADLs. Pt plans for home with daughter and 24hr assist. Pt would benefit from ongoing [facility] at d/. Will follow as inpt. Treatment Diagnosis: impaired ADLs and functional transfers d/t duodenal ulcer with perforation  OT Education: Transfer Training;IADL Safety; ADL Adaptive Strategies  Patient Education: verb understanding  REQUIRES OT FOLLOW UP: Yes  Activity Tolerance  Activity Tolerance: Patient Tolerated treatment well;Patient limited by fatigue  Activity Tolerance: pt with limited standing tolerance - needing freq seated rest 2/2 R hip / back pain  Safety Devices  Safety Devices in place: Yes  Type of devices: Call light within reach; Left in chair;Nurse notified (no alarm box in room - RN aware)         Patient Diagnosis(es): The encounter diagnosis was Duodenal ulcer with perforation ([address] has a past medical history of Arthritis, Asthma, Cervical radiculopathy at C5 bilaterally and left C7, Diabetes mellitus ([address] Hyperlipidemia, and Hypertension. has a past surgical history that includes Vaginal prolapse repair; joint replacement; Foot surgery; Hysterectomy; eye surgery; Bunionectomy (11/11/11); Hammer toe surgery (11/11/11); and laparoscopy (N/A, 9/7/2021). Restrictions  Position Activity Restriction  Other position/activity restrictions: no activity orders noted    Subjective   General  Chart Reviewed: Yes  Additional Pertinent Hx: 79 yo admit to ED 9/7 p/w abdominal pain. Undergoing LAPAROSCOPY, SUTURE REPAIR OF DUODENAL ULCER, OMENTEM FLAP, EGD on 9/7. PMHx: arthritis, asthma, HLD, DM, HTN, B knee replacements  Family / Caregiver Present: No  Referring Practitioner: Kiet Bains MD  Diagnosis: duodenal ulcer with perforation  Subjective  Subjective: \" I want to go home \" pt found in chair -- agreeable for OT tx. Pt reports she hasn't been able to take care of herself for several weeks prior to hospital stay  General Comment  Comments: Ayesha Erazo Vital Signs  Patient Currently in Pain: Yes (back pain -- not rated - RN aware)     Orientation  Orientation  Overall Orientation Status: Within Functional Limits    Objective    ADL  Feeding: Setup  Grooming: Setup  LE Dressing: Moderate assistance (to thread into briefs -- CGA in stance for over hips)  Toileting:  Moderate assistance;Minimal assistance (with pericare -simulated and needing CGA in stance with brief management)        Balance  Sitting Balance: Supervision  Standing Balance: Contact guard assistance (with walker)  Standing Balance  Time: 1 mins x 1, 3 mins x 2  Activity: functional transfers /stance for pericare/ functional mobility in/out bathroom  Functional Mobility  Functional - Mobility Device:

## 2021-09-13 NOTE — CARE COORDINATION
Cm following, Surgery team spoke with dtr and they now want SNF, PT OT worked with pt today 18/18, referral sent to SNF choice from Dtr, pending acceptance, no precert needed due to [employer].   Electronically signed by Valentín Gray RN on 9/13/2021 at 2:43 PM  [phone]

## 2021-09-13 NOTE — PROGRESS NOTES
Pt a/o x4, BP elevated- hydralazine given per orders, all other VSS. Lungs remain clear, abdomen soft and non-tender. MARIBELL removed today by surgical team- dressing remains CDI. SCD's remain in place. Pt up SBA with walker, gait slow, steady, and coordinated. No new skin issues. Urine output adequate. One loose BM this shift. All needs met at this time, will continue with plan of care.

## 2021-09-13 NOTE — PROGRESS NOTES
Alert, oriented. Had low blood sugar that recovered with one tube of carbohydrate. Patient asymptomatic. Up to [facility] for BM during the night. Sleeping between care/ interventions. Interdry used under left breast and left abdominal fold. Skin peeling due to skin condition. Mepelix on area on buttock patient stated felt raw. On inspection, pink, dry. Purewick changed. IV fluids discontinued per order at 0600.

## 2021-09-13 NOTE — PROGRESS NOTES
POC:    Spoke with daughter on the phone regarding discharge status for the patient. The daughter is concerned that the patient may be at home by herself too long since she will have to return to work. She asked me to refer her mother to [facility] and [facility]. Spoke with Ash Toth, , who is calling to see if the patient would be accepted to their rehab programs. The patient was just seen by PT/OT today where she was able to score 18/18. Instructed the daughter I will keep informed of the information regarding placing the patient in a rehab facility    MONSERRAT Galvez NP-C  [phone]  Resident Support Staff

## 2021-09-14 VITALS
HEART RATE: 86 BPM | OXYGEN SATURATION: 96 % | DIASTOLIC BLOOD PRESSURE: 66 MMHG | TEMPERATURE: 97.8 F | BODY MASS INDEX: 29.02 KG/M2 | WEIGHT: 170 LBS | SYSTOLIC BLOOD PRESSURE: 118 MMHG | HEIGHT: 64 IN | RESPIRATION RATE: 16 BRPM

## 2021-09-14 LAB
ALBUMIN SERPL-MCNC: 2.6 G/DL (ref 3.4–5)
ANION GAP SERPL CALCULATED.3IONS-SCNC: 10 MMOL/L (ref 3–16)
BASOPHILS ABSOLUTE: 0 K/UL (ref 0–0.2)
BASOPHILS RELATIVE PERCENT: 0 %
BUN BLDV-MCNC: 6 MG/DL (ref 7–20)
CALCIUM SERPL-MCNC: 8.2 MG/DL (ref 8.3–10.6)
CHLORIDE BLD-SCNC: 106 MMOL/L (ref 99–110)
CO2: 22 MMOL/L (ref 21–32)
CREAT SERPL-MCNC: 0.7 MG/DL (ref 0.6–1.2)
EOSINOPHILS ABSOLUTE: 0.2 K/UL (ref 0–0.6)
EOSINOPHILS RELATIVE PERCENT: 4 %
GFR AFRICAN AMERICAN: >60
GFR NON-AFRICAN AMERICAN: >60
GLUCOSE BLD-MCNC: 107 MG/DL (ref 70–99)
GLUCOSE BLD-MCNC: 75 MG/DL (ref 70–99)
GLUCOSE BLD-MCNC: 78 MG/DL (ref 70–99)
HCT VFR BLD CALC: 27.3 % (ref 36–48)
HEMOGLOBIN: 9.2 G/DL (ref 12–16)
LYMPHOCYTES ABSOLUTE: 0.9 K/UL (ref 1–5.1)
LYMPHOCYTES RELATIVE PERCENT: 21 %
MAGNESIUM: 1.9 MG/DL (ref 1.8–2.4)
MCH RBC QN AUTO: 26.9 PG (ref 26–34)
MCHC RBC AUTO-ENTMCNC: 33.6 G/DL (ref 31–36)
MCV RBC AUTO: 80.3 FL (ref 80–100)
MONOCYTES ABSOLUTE: 0.4 K/UL (ref 0–1.3)
MONOCYTES RELATIVE PERCENT: 8 %
NEUTROPHILS ABSOLUTE: 2.9 K/UL (ref 1.7–7.7)
NEUTROPHILS RELATIVE PERCENT: 67 %
PDW BLD-RTO: 17.8 % (ref 12.4–15.4)
PERFORMED ON: ABNORMAL
PERFORMED ON: NORMAL
PHOSPHORUS: 4.3 MG/DL (ref 2.5–4.9)
PLATELET # BLD: 294 K/UL (ref 135–450)
PMV BLD AUTO: 7.3 FL (ref 5–10.5)
POTASSIUM SERPL-SCNC: 3.5 MMOL/L (ref 3.5–5.1)
RBC # BLD: 3.4 M/UL (ref 4–5.2)
SODIUM BLD-SCNC: 138 MMOL/L (ref 136–145)
WBC # BLD: 4.4 K/UL (ref 4–11)

## 2021-09-14 PROCEDURE — C9113 INJ PANTOPRAZOLE SODIUM, VIA: HCPCS | Performed by: STUDENT IN AN ORGANIZED HEALTH CARE EDUCATION/TRAINING PROGRAM

## 2021-09-14 PROCEDURE — 83735 ASSAY OF MAGNESIUM: CPT

## 2021-09-14 PROCEDURE — 6370000000 HC RX 637 (ALT 250 FOR IP): Performed by: STUDENT IN AN ORGANIZED HEALTH CARE EDUCATION/TRAINING PROGRAM

## 2021-09-14 PROCEDURE — 6360000002 HC RX W HCPCS: Performed by: STUDENT IN AN ORGANIZED HEALTH CARE EDUCATION/TRAINING PROGRAM

## 2021-09-14 PROCEDURE — 85025 COMPLETE CBC W/AUTO DIFF WBC: CPT

## 2021-09-14 PROCEDURE — 99024 POSTOP FOLLOW-UP VISIT: CPT | Performed by: SURGERY

## 2021-09-14 PROCEDURE — 80069 RENAL FUNCTION PANEL: CPT

## 2021-09-14 PROCEDURE — 36415 COLL VENOUS BLD VENIPUNCTURE: CPT

## 2021-09-14 RX ORDER — MAGNESIUM SULFATE IN WATER 40 MG/ML
2000 INJECTION, SOLUTION INTRAVENOUS ONCE
Status: COMPLETED | OUTPATIENT
Start: 2021-09-14 | End: 2021-09-14

## 2021-09-14 RX ORDER — POTASSIUM CHLORIDE 20 MEQ/1
40 TABLET, EXTENDED RELEASE ORAL ONCE
Status: COMPLETED | OUTPATIENT
Start: 2021-09-14 | End: 2021-09-14

## 2021-09-14 RX ADMIN — PANTOPRAZOLE SODIUM 40 MG: 40 INJECTION, POWDER, FOR SOLUTION INTRAVENOUS at 07:44

## 2021-09-14 RX ADMIN — MAGNESIUM SULFATE HEPTAHYDRATE 2000 MG: 2 INJECTION, SOLUTION INTRAVENOUS at 07:54

## 2021-09-14 RX ADMIN — POTASSIUM CHLORIDE 40 MEQ: 1500 TABLET, EXTENDED RELEASE ORAL at 07:44

## 2021-09-14 RX ADMIN — ACETAMINOPHEN 1000 MG: 650 SOLUTION ORAL at 07:44

## 2021-09-14 ASSESSMENT — PAIN SCALES - GENERAL
PAINLEVEL_OUTOF10: 0
PAINLEVEL_OUTOF10: 0

## 2021-09-14 NOTE — PROGRESS NOTES
Pt remained A&Ox4 and VSS. Surgical sites clean, dry, intact. Pt remained voiding freely, denied pain during shift. Discharge order acknowledged and IV removed. Pt and Pt's family received discharge education at bedside, all questions answered.  Pt escorted safely off unit to hospital lobby by wheelchair and discharged around 1:35 PM    Electronically signed by Donnel Councilman, RN on 9/14/2021 at 1:35 PM

## 2021-09-14 NOTE — PROGRESS NOTES
Surgery Daily Progress Note  Aspen Kwok  CC:  Duodenal perforation      Subjective :No acute events overnight. Patient did require prn BP medication due to episode of HTN. Patient working with PT/OT. Tolerating diet. Case management working on placement for patient      Objective    Infusions:   sodium chloride      dextrose          I/O:I/O last 3 completed shifts: In: 440 [P.O.:240; I.V.:200]  Out: 990 [Urine:950; Drains:40]           Wt Readings from Last 1 Encounters:   09/07/21 170 lb (77.1 kg)                 LABS:    Recent Labs     09/13/21  0657 09/14/21  0455   WBC 5.1 4.4   HGB 9.6* 9.2*   HCT 28.8* 27.3*   MCV 80.4 80.3    294        Recent Labs     09/13/21  0657 09/14/21  0455    138   K 3.6 3.5    106   CO2 21 22   PHOS 3.9 4.3   BUN 4* 6*   CREATININE 0.6 0.7      No results for input(s): AST, ALT, ALB, BILIDIR, BILITOT, ALKPHOS in the last 72 hours. No results for input(s): LIPASE, AMYLASE in the last 72 hours. No results for input(s): PROT, INR, APTT in the last 72 hours. No results for input(s): CKTOTAL, CKMB, CKMBINDEX, TROPONINI in the last 72 hours. Exam:/68   Pulse 87   Temp 97.9 °F (36.6 °C) (Oral)   Resp 16   Ht 5' 4\" (1.626 m)   Wt 170 lb (77.1 kg)   SpO2 97%   BMI 29.18 kg/m²   General appearance: alert, appears stated age and cooperative  Eyes: No scleral icterus, EOM grossly intact  Neck: trachea midline, no JVD, no lymphadenopathy, neck supple  Chest/Lungs: CTAB, no crackles/rales, wheezes/rhonchi, normal effort with no accessory muscle use on RA  Cardiovascular: RRR  Abdomen: Soft, non-tender, incisions c/d/i and well approximated with Dermabond. Skin: warm and dry, no rashes  Extremities: no edema, no cyanosis  Genitourinary: No clifford in place  Neuro: A&Ox3, no focal deficits, sensation intact    ASSESSMENT/PLAN: Pt. is a 80 y.o. female s/p omental patch secondary to duodenal perforation (9/7).  POD6    - case management to continue to place patient to SNF  - continue to encourage OOB/ Ambulation  - 4 days of abx completed  - continue low fiber diet        Meri Londono, APRN - [facility] 9/14/2021 6:37 AM  [phone]    Attending Supervising Physician's [address]  I performed a history and physical examination on the patient and discussed the management with the nurse practitioner. I reviewed and agree with the findings and plan as documented in her note . Doing well. Dispo planning today.     Electronically signed by Zach Sun MD on 9/14/21 at 7:45 AM EDT

## 2021-09-14 NOTE — DISCHARGE INSTR - COC
Continuity of Care Form    Patient Name: Anastacio Rivero   :  1930  MRN:  [id number]    Admit date:  2021  Discharge date:  ***    Code Status Order: Prior   Advance Directives:      Admitting Physician:  Gerhardt Cords, MD  PCP: Angeli Pérez MD    Discharging Nurse: [facility] Unit/Room#: 3891/5422-27  Discharging Unit Phone Number: ***    Emergency Contact:   Extended Emergency Contact Information  Primary Emergency Contact: Mitch Carnes [address] Phone: [phone]  Work Phone: [phone]  Relation: Child  Secondary Emergency Contact: Yeni Major of [address] [place] [address] Phone: [phone]  Relation: Child    Past Surgical History:  Past Surgical History:   Procedure Laterality Date    BUNIONECTOMY  11    EYE SURGERY      bilateral cataracts    FOOT SURGERY      left heel spurs    HAMMER TOE SURGERY  11    HYSTERECTOMY      JOINT REPLACEMENT      sriram knees    LAPAROSCOPY N/A 2021    LAPAROSCOPY, SUTURE REPAIR OF DUODENAL ULCER, OMENTEM FLAP, EGD performed by Gerhardt Cords, MD at [address] [place] [address]         Immunization History:   Immunization History   Administered Date(s) Administered    COVID-19, Moderna, PF, 100mcg/0.5mL 2021, 2021    Influenza Vaccine, unspecified formulation 2010, 10/12/2011, 2012, 2013, 2014    Influenza Virus Vaccine 2014, 10/12/2015    Influenza Whole 10/12/2011, 2014, 2014    Influenza, High Dose (Fluzone 65 yrs and older) 2016, 2017, 2019    Influenza, Triv, inactivated, subunit, adjuvanted, IM (Fluad 65 yrs and older) 09/10/2019, 2020    Pneumococcal Conjugate 13-valent (Gpalymq53) 2016    Pneumococcal Polysaccharide (Bitixnton97) 2004, 2014    Tdap (Boostrix, Adacel) 2015       Active Problems:  Patient Active Problem List   Diagnosis Code    Diabetes mellitus type II, controlled ([facility] E11.9    HTN (hypertension) I10    Hyperlipidemia E78.5    Peptic ulcer K27.9    Seasonal allergies J30.2    Cervical radiculopathy at C5 bilaterally and left C7 M54.12    Moderate persistent asthma without complication K12.07    Obesity (BMI 30-39. 9) E66.9    Other constipation K59.09    Absolute anemia D64.9    Other hyperlipidemia E78.49    Gastroesophageal reflux disease without esophagitis K21.9    Chronic constipation K59.09    Acquired hypothyroidism E03.9    Duodenal ulcer with perforation (HCC) K26.5    Duodenal perforation (HCC) K63.1       Isolation/Infection:   Isolation            No Isolation          Patient Infection Status       None to display            Nurse Assessment:  Last Vital Signs: BP (!) 154/76   Pulse 88   Temp 98 °F (36.7 °C) (Oral)   Resp 16   Ht 5' 4\" (1.626 m)   Wt 170 lb (77.1 kg)   SpO2 97%   BMI 29.18 kg/m²     Last documented pain score (0-10 scale): Pain Level: 0  Last Weight:   Wt Readings from Last 1 Encounters:   09/07/21 170 lb (77.1 kg)     Mental Status:  {IP PT MENTAL STATUS:20030:::0}    IV Access:  { LORNE IV ACCESS:559529951:::0}    Nursing Mobility/ADLs:  Walking   {CHP DME ADLs:248544225:::0}  Transfer  {CHP DME ADLs:995939795:::0}  Bathing  {CHP DME ADLs:182167330:::0}  Dressing  {CHP DME ADLs:199287416:::0}  Toileting  {CHP DME ADLs:321478001:::0}  Feeding  {CHP DME ADLs:781524878:::0}  Med Admin  {CHP DME ADLs:162223589:::0}  Med Delivery   { LORNE MED Delivery:420985040:::0}    Wound Care Documentation and Therapy:        Elimination:  Continence: Bowel: {YES / XC:38429}  Bladder: {YES / AQ:59397}  Urinary Catheter: {Urinary Catheter:263827073:::0}   Colostomy/Ileostomy/Ileal Conduit: {YES / IC:92606}       Date of Last BM: ***    Intake/Output Summary (Last 24 hours) at 9/14/2021 0939  Last data filed at 9/14/2021 0807  Gross per 24 hour   Intake 300 ml   Output 1450 ml   Net -1150 ml     I/O last 3 completed shifts:   In: 240 [P.O.:240]  [facility]: 1450 [facility]]    Safety Concerns:     [address] Safety Concerns:699934385:::0}    Impairments/Disabilities:      [address] PRADHAN Impairments/Disabilities:743220799:::0}    Nutrition Therapy:  Current Nutrition Therapy:   [address] PR[address]HAN Diet List:764709277:::0}    Routes of Feeding: {CHP DME Other Feedings:250436366:::0}  Liquids: {Slp liquid thickness:11696}  Daily Fluid Restriction: {CHP DME Yes amt example:410258252:::0}  Last Modified Barium Swallow with Video (Video Swallowing Test): {Done Not Done RLFU:981773533:::8}    Treatments at the Time of Hospital Discharge:   Respiratory Treatments: ***  Oxygen Therapy:  {Therapy; copd oxygen:53211:::0}  Ventilator:    {[facility] Vent List:544458884:::0}    Rehab Therapies: {THERAPEUTIC INTERVENTION:2769143602}  Weight Bearing Status/Restrictions: [address] A[address]  Weight Bearin:::0}  Other Medical Equipment (for information only, NOT a DME order):  {EQUIPMENT:108517903}  Other Treatments: ***    Patient's personal belongings (please select all that are sent with patient):  {CHP DME Belongings:696416464:::0}    RN SIGNATURE:  {Esignature:957957067:::0}    CASE MANAGEMENT/SOCIAL WORK SECTION    Inpatient Status Date: ***    Readmission Risk Assessment Score:  Readmission Risk              Risk of Unplanned Readmission:  20           Discharging to Facility/ Agency   Name:   Address:  Phone:  Fax:    Dialysis Facility (if applicable)   Name:  Address:  Dialysis Schedule:  Phone:  Fax:    / signature: {Esignature:019500423:::0}    PHYSICIAN SECTION    Prognosis: Good    Condition at Discharge: Stable    Rehab Potential (if transferring to Rehab): Good    Recommended Labs or Other Treatments After Discharge: Continue PT/OT 3 x a week    Physician Certification: I certify the above information and transfer of Herbert Hart  is necessary for the continuing treatment of the diagnosis listed and that she requires Home Care for less 30 days.      Update Admission H&P: No

## 2021-09-14 NOTE — CARE COORDINATION
Case Management Assessment            Discharge Note                    Date / Time of Note: 9/14/2021 9:53 AM                  Discharge Note Completed by: Anirudh Mcfarland RN    Patient Name: Davi Ramires   YOB: 1930  Diagnosis: Duodenal ulcer with perforation ([facility] [K26.5]  Duodenal perforation ([facility] [K63.1]   Date / Time: 9/7/2021  6:12 AM    Current PCP: Daylin Estevez MD  Clinic patient: No    Hospitalization in the last 30 days: No    Advance Directives:  Code Status: Prior  [place] DNR form completed and on chart: Not Indicated    Financial:  Payor: Gisele Hernandez / Plan: [place] / Product Type: *No Product type* /      Pharmacy:    [address] [address]  Phone: [phone] Fax: [phone] [address] [place] [id number] [facility] [phone]  [address] [place] [address], [address] [place] [address],[address]  [facility] [address]  Phone: [phone] Fax: [phone]      Assistance purchasing medications?:    Assistance provided by Case Management: None at this time    Does patient want to participate in local refill/ meds to beds program?:      Meds To Beds General Rules:  1. Can ONLY be done Monday- Friday between 8:30am-5pm  2. Prescription(s) must be in pharmacy by 3pm to be filled same day  3. Copy of patient's insurance/ prescription drug card and patient face sheet must be sent along with the prescription(s)  4. Cost of Rx cannot be added to hospital bill. If financial assistance is needed, please contact unit  or ;  or  CANNOT provide pharmacy voucher for patients co-pays  5.  Patients can then  the prescription on their way out of the hospital at discharge, or pharmacy can deliver to the bedside if staff is available. (payment due at time of pick-up or delivery - cash, check, or card accepted)     Able to afford home medications/ co-pay costs: Yes    ADLS:  Current PT AM-PAC Score: 18 /24  Current OT AM-PAC Score: 18 /24      DISCHARGE Disposition: Home with [address]: [address] skilled [facility]     LOC at discharge: Not Applicable  LORNE Completed: Not Indicated    Notification completed in HENS/PAS?:  Not Applicable    IMM Completed:   Yes, Case management has presented and reviewed IMM letter #2 to the patient and/or family/ POA. Patient and/or family/POA verbalized understanding of their medicare rights and appeal process if needed. Patient and/or family/POA has signed, initialed and placed today's date (9/14/21) and time (9159) on IMM letter #2 on the the appropriate lines. Patient and/or family/POA, copy of letter offered and they are aware that this original copy of IMM letter #2 is available prior to discharge from the paper chart on the unit. Electronic documentation has been entered into epic for IMM letter #2 and original paper copy has been added to the paper chart at the nurses station.      Transportation:  Transportation PLAN for discharge: family   Mode of Transport: [address] ordered at discharge: Yes  [address]: [facility] [address]  Phone: [phone]  Fax: [phone]  Orders faxed: Sheng Smith aware of DC will follow at [facility]:  DME Provider: none  Equipment obtained during hospitalization:     Home Oxygen and Respiratory Equipment:  Oxygen needed at discharge?: Not [address]: Not Applicable  Portable tank available for discharge?: Not Indicated    Dialysis:  Dialysis patient: No    Dialysis Center:  Not Applicable      Additional CM Notes: Pt from home with Dtr Philip Salinas wanted SNF was accepted at [facility] point now doesn't want SNF want to take pt home, explained option for DC, now [facility] today Dtr Will be here at 1pm to get pt. Dtr concern yesterday was that pt would be alone while working pt Dtr now states she has that figured out. The Plan for Transition of Care is related to the following treatment goals of Duodenal ulcer with perforation ([facility] [K26.5]  Duodenal perforation ([facility] [K63.1]    The Patient and/or patient representative Serafin Trinidad and her family were provided with a choice of provider and agrees with the discharge plan Yes    Freedom of choice list was provided with basic dialogue that supports the patient's individualized plan of care/goals and shares the quality data associated with the providers.  Yes    Care Transitions patient: Yes    Nivia Oneill RN  [facility]  Case Management Department  Ph: [phone]  Fax: [phone]

## 2021-09-15 ENCOUNTER — CARE COORDINATION (OUTPATIENT)
Dept: CASE MANAGEMENT | Age: 86
End: 2021-09-15

## 2021-09-15 DIAGNOSIS — K26.9 DUODENAL ULCER: Primary | ICD-10-CM

## 2021-09-15 PROCEDURE — 1111F DSCHRG MED/CURRENT MED MERGE: CPT | Performed by: INTERNAL MEDICINE

## 2021-09-15 NOTE — CARE COORDINATION
Transitions of Care Initial Call    Was this an external facility discharge? No     Challenges to be reviewed by the provider   Additional needs identified to be addressed with provider: No  none             Method of communication with provider : face to face      Advance Care Planning:   Does patient have an Advance Directive: reviewed and current. Was this a readmission? No  Patients top risk factors for readmission: medical condition-S/P Laparoscopic Surgery    Care Transition Nurse (CTN) contacted the family by telephone to perform post hospital discharge assessment. Verified name and  with family as identifiers. Provided introduction to self, and explanation of the CTN role. CTN reviewed discharge instructions, medical action plan and red flags with family who verbalized understanding. Family given an opportunity to ask questions and does not have any further questions or concerns at this time. Were discharge instructions available to patient? Yes. Reviewed appropriate site of care based on symptoms and resources available to patient including: PCP, Specialist, Urgent care clinics and When to call 911. The family agrees to contact the PCP office for questions related to their healthcare. Medication reconciliation was performed with family, who verbalizes understanding of administration of home medications. Advised obtaining a 90-day supply of all daily and as-needed medications. Covid Risk Education     Educated patient about risk for severe COVID-19 due to risk factors according to CDC guidelines. CTN reviewed discharge instructions, medical action plan and red flag symptoms with the family who verbalized understanding. Discussed COVID vaccination status: Yes. Education provided on COVID-19 vaccination as appropriate. Discussed exposure protocols and quarantine with CDC Guidelines.  Family was given an opportunity to verbalize any questions and concerns and agrees to contact CTN or [facility] care provider for questions related to their healthcare. Reviewed and educated family on any new and changed medications related to discharge diagnosis. Was patient discharged with a pulse oximeter? No Discussed and confirmed pulse oximeter discharge instructions and when to notify provider or seek emergency care. CTN spoke with patients daughter Hawa Vigil this am for initial 24 hour discharge follow up CTN call. Daughter states patient is doing well, resting. No reports of any pain, no fever, chills, nausea, vomiting, chest pain, SOB or cough. No drainage noted at incision sites, CTN encouraged daughter to continue to monitor for any signs of infection- increased redness or swelling, drainage, odorous drainage, warmth to touch. CTN and patients family member reviewed meds and CTN completed the 1111f. Daughter to contact Surgeon today to schedule follow up appointment. CTN did confirm with intake department with [facility], confirmed with [facility] [address] orders were received. CTN provided education on s/s that require medical attention and when to seek medical attention. CTN advised Pt of use urgent care or physicians 24 hr access line if assistance is needed after hours or on the weekend. Pt denies any needs or concerns and is agreeable with additional calls. CTN provided contact information. Plan for follow-up call in 1-2 days based on severity of symptoms and risk factors. Plan for next call: symptom management-Pain, post op complications.       Thank Georgi Hauser RN  Care Transition Coordinator  Contact JOSEFINA:[phone]

## 2021-09-16 NOTE — DISCHARGE SUMMARY
Physician Discharge Summary     Patient ID:  Reena Fuentes  [id number]  89 y.o.  12/14/1930    Admit date: 9/7/2021    Discharge date and time: 9/14/2021  1:30 PM     Admitting Physician: Cat Rivas MD     Discharge Physician: same    Admission Diagnoses: Duodenal ulcer with perforation ([facility] [K26.5]  Duodenal perforation ([facility] [K63.1]    Discharge Diagnoses: same    Admission Condition: fair    Discharged Condition: good    Indication for Admission: surgery, IV hydration, IV pain control, IV nausea control    Hospital Course: 79 y/o female admitted through the ED with the c/o RUQ abdominal pain. A CT was obtained while in the ED which indicated a perforated duodenal bulb ulcer with free air and fluid. The patient was pre opped for surgery where a laparoscopic suture repair of duodenal perforation, creation of greater omental pedicle vascularized flap and EGD were performed. Her surgery was uneventful and she was taken to PACU to begin her recovery. She remained hemodynamically stable and was transferred to the ICU for close evaluation    POD # 1 - No acute events overnight. Remains on Morphine and Robaxin for pain control. Patient remains NPO with PPI. MIVF @ 125 ml/hr. Doing well overall and can be transferred to med/surg floor    POD # 2 - No acute events overnight. Improving well. Will go ahead and dc the clifford catheter today, patient to remain a void check. Keep NPO and will plan for UGI in the am. Continue abx. Continue to hold Xarelto until tolerating po intake    POD # 3 - No acute events overnight. Continues to improve. UGI negative for leak. NGT removed. Will start on  sips of clears but will advance diet very slowy. Working with PT/OT    POD # 4 - No acute events overnight. Continue CLD for a total of 3 days, then will advance to fulls. Continue MARIBELL drain, anticipate removal tomorrow. Patient with a total of 4 days abx therapy, will dc now. Hold Xarelto until tolerating po.  Continue OOB/Ambulation    POD # 5 - Patient continues to improve. Tolerating CLD. Will advance tomorrow. MARIBELL to stay. Will re start Xarelto when tolerating soft diet. Continue OOB    POD # 6 - No acute events overnight. Patient passing flatus/ +,BM. Tolerating CLD. Will anticipate advancing diet to low fiber. Abx completed. Will restart Xarelto today. Maral Gastelum will be removed at bedside today. PT/OT to re evaluate today. Spoke with daughter on the phone regarding discharge status for the patient. The daughter is concerned that the patient may be at home by herself too long since she will have to return to work. She asked me to refer her mother to [facility] and [facility]. Spoke with Ash Toth, , who is calling to see if the patient would be accepted to their rehab programs. The patient was just seen by PT/OT today where she was able to score 18/18.      Instructed the daughter I will keep informed of the information regarding placing the patient in a rehab facility    POD # 7 - No acute events overnight. Tolerating diet. Case management continues to work on placement for patient. Tolerating diet. Voiding appropriately. +flatus/+BM. After additional conversations with daughter, she has now agreed to have the patient discharged home. The daughter states she is working on possibly placing patient in an [facility] but will continue this by herself    Consults: none        Outstanding Order Results     No orders found from 8/9/2021 to 9/8/2021.           Treatments: IV hydration, antibiotics: Cipro and metronidazole and analgesia: Dilaudid    Discharge Exam:                 Exam:/68   Pulse 87   Temp 97.9 °F (36.6 °C) (Oral)   Resp 16   Ht 5' 4\" (1.626 m)   Wt 170 lb (77.1 kg)   SpO2 97%   BMI 29.18 kg/m²   General appearance: alert, appears stated age and cooperative  Eyes: No scleral icterus, EOM grossly intact  Neck: trachea midline, no JVD, no lymphadenopathy, neck supple  Chest/Lungs: CTAB, no crackles/rales, wheezes/rhonchi, normal effort with no accessory muscle use on RA  Cardiovascular: RRR  Abdomen: Soft, non-tender, incisions c/d/i and well approximated with Dermabond. Skin: warm and dry, no rashes  Extremities: no edema, no cyanosis  Genitourinary: No clifford in place  Neuro: A&Ox3, no focal deficits, sensation intact    Disposition: home    Patient Instructions:   [unfilled]  Activity: no lifting, Driving, or Strenuous exercise until seen at your follow up appointment  Diet: regular diet  Wound Care: none needed    Follow-up with Dr. Fernandez Flores in 2 weeks.     Signed:  CARIN Anguiano - CNP  9/16/2021  11:16 AM

## 2021-09-17 ENCOUNTER — CARE COORDINATION (OUTPATIENT)
Dept: CASE MANAGEMENT | Age: 86
End: 2021-09-17

## 2021-09-17 NOTE — CARE COORDINATION
[facility] Transitions Initial Follow Up Call    Call within 2 business days of discharge: Yes    Patient:  Rani Gardner   Patient :  1930  MRN:  [id number]     Reason for Admission:  Duodenal Ulcer, Repair   Discharge Date:  21    RARS:       Non-face-to-face services provided:    1ST CTC attempt to reach Pt regarding recent hospital discharge. CTC left voice recording with call back number requesting a call back.     Follow up appointments:    Future Appointments   Date Time Provider Department Center   2021  9:20 AM Bebe Tello MD [place] [facility]   10/15/2021  2:40 PM Norberto Martinez MD [facility]       Thank You,    Richa Dennis RN  Care Transition Coordinator  Contact Number:[phone]

## 2021-09-21 ENCOUNTER — CARE COORDINATION (OUTPATIENT)
Dept: CASE MANAGEMENT | Age: 86
End: 2021-09-21

## 2021-09-21 NOTE — CARE COORDINATION
[facility] Transitions Initial Follow Up Call    Call within 2 business days of discharge: Yes    Patient:  Chapincito Dodson   Patient :  1930  MRN:  [id number]     Reason for Admission: Duodenal Ulcer, Repair    Discharge Date:  21 R   ARS:       Non-face-to-face services provided:    2ND CTC attempt to reach Pt regarding recent hospital discharge. CTC left voice recording with call back number requesting a call back. CTN will close out CTN episode at this time.     Follow up appointments:    Future Appointments   Date Time Provider Haritha Nguyen   10/15/2021  2:40 PM Perez Hughes MD [facility]       Thank You,    Minesh Fisher RN  Care Transition Coordinator  Contact Number:[phone]

## 2021-10-06 ENCOUNTER — TELEPHONE (OUTPATIENT)
Dept: SURGERY | Age: 86
End: 2021-10-06

## 2021-10-06 NOTE — TELEPHONE ENCOUNTER
Cheko Fonseca with [facility] called in requesting verbal orders to continue the patient's occupational therapy. Twice a week for 3 weeks and one time a week for one week.     Please contact Cheko Fonseca at [phone]

## 2021-10-11 ENCOUNTER — TELEPHONE (OUTPATIENT)
Dept: INTERNAL MEDICINE CLINIC | Age: 86
End: 2021-10-11

## 2021-10-11 NOTE — TELEPHONE ENCOUNTER
The daughter has reached out to Hospice for services for mother. Hospice is requesting a referral for the patient to receive care. An evaluation has been scheduled at 2:00 pm today with Hospice. Patient can no longer take care of herself and health is declining. Please send referral to [phone].

## 2021-10-11 NOTE — TELEPHONE ENCOUNTER
Pt daughter is requesting a referral to hospice for care of her mother.  Daughter states her mother can no longer care for herself and her health is declining

## 2021-10-12 NOTE — TELEPHONE ENCOUNTER
I called pt daughter to let her know that her mom would need an appt for referral pt daughter wants to know if that is necessary for a referral due to her mom can not come in pt has an appt on the 15 but will not be able to make it due to her condition and health

## 2021-10-14 NOTE — TELEPHONE ENCOUNTER
i spoke to pt daughter and she stated she rather not have vv visit but would rather have referral to get hospice started pt hospice.  Pt daughter can be reached at [phone]

## 2021-10-15 NOTE — TELEPHONE ENCOUNTER
I called and spoke to pt daughter and she stated her mom is now in the hospital and has been for the last 2 days.  Daughter states that pt would need referral to hospice for palliative care for a nurse to come out

## 2021-10-28 ENCOUNTER — TELEPHONE (OUTPATIENT)
Dept: INTERNAL MEDICINE CLINIC | Age: 86
End: 2021-10-28

## 2021-10-28 NOTE — TELEPHONE ENCOUNTER
ELIO message pt is currently at [facility] and will be discharged to her daughter house Saturday to start hospice

## 2021-10-28 NOTE — TELEPHONE ENCOUNTER
Calling to inform physician patient is currently at [facility] and will be discharged Saturday to her [facility] and house to start at home hospice.  ELIO

## 2022-04-02 NOTE — PROGRESS NOTES
ICU transfer. VSS. Afebrile. IV infusing. SCDs on. x1 with walker. HOB >30. NG R nare. 63cm. CLWS. Patient has skin disorder, scabs and redness scattered over body. No other skin issues noted. 4 eyes completed with 2nd RN. Alarm on. surg sites CDI. Hypoactive bowel sounds. Call light within reach.  Will cont to monitor Upon patient's first set of vital signs this shift, temperature was reading low. Rectal temperature obtained to ensure accuracy, reading - 94.9. Other VS stable. Charge RN made aware and dr. Jacki Zuleta was called, order for warming blanket obtained and placed on patient. Continuous rectal thermometer probe also placed. Patient HR was running 50's bpm, propanolol ordered. Patient also did not take all of medication, unsure how much was taken due to mixing in applesauce. Keppra on medication list. Dr. Jacki Zuleta called again to make aware, orders to hold propanolol tonight and give Keppra IV. Orders put in computer and pharmacy called to verify [address]. Awaiting verification at this time. Patient temperature has been rising as expected, currently 96.3.

## 2023-07-28 NOTE — ASSESSMENT & PLAN NOTE
RN called patient back for further assessment of \"faint feeling\".    RN connected with patient. Patient states she sometimes feel faint at time.  Patient states her HR will fluctuate from 's.     When patient is experiencing this she sometimes feel dizzy. Nothing triggers, happens randomly when waling. Not at rest.   No pain at the time of feeling. Occasional blurred vision.     Patient has not checked Bp during these episodes.       Patient states the episodes happen and she is worried she might pass out, requiring her to sit down.     Patient with apt with Dr. Reyes on 9/8/23.   Next apt with Dr. limon in 11/23.       Visiting MD BP at goal she states. Today BP stable. -Topic and history reviewed. -Follow up 6 months fasting with BP readings.

## 2023-08-25 NOTE — TELEPHONE ENCOUNTER
----- Message from Clarissa Valiente LPN sent at 8/24/2023  4:53 PM CDT -----    ----- Message -----  From: Orin Toledo  Sent: 8/24/2023   4:21 PM CDT  To: [facility]    Type:  Needs Medical Advice    Who Called: pt mom yamini Perea Call Back Number: [phone] (home)     Additional Information:  Requesting call back , mom is requesting that the pt picture be remove asap..     please advise thank you           MSG NOTED  F/U APPT

## (undated) DEVICE — TOWEL,STOP FLAG GOLD N-W: Brand: MEDLINE

## (undated) DEVICE — APPLICATOR MEDICATED 26 CC SOLUTION HI LT ORNG CHLORAPREP

## (undated) DEVICE — SCISSORS ENDOSCP DIA5MM CRV MPLR CAUT W/ RATCH HNDL

## (undated) DEVICE — SOLUTION ANTIFOG VIS SYS CLEARIFY LAPSCP

## (undated) DEVICE — SUTURE VCRL SZ 3-0 L18IN ABSRB UD L26MM SH 1/2 CIR J864D

## (undated) DEVICE — [HIGH FLOW INSUFFLATOR,  DO NOT USE IF PACKAGE IS DAMAGED,  KEEP DRY,  KEEP AWAY FROM SUNLIGHT,  PROTECT FROM HEAT AND RADIOACTIVE SOURCES.]: Brand: PNEUMOSURE

## (undated) DEVICE — PLATE ES AD W 9FT CRD 2

## (undated) DEVICE — SUTURE PERMAHAND SZ 2-0 L18IN NONABSORBABLE BLK L26MM SH C012D

## (undated) DEVICE — GLOVE SURG SZ 7 CRM LTX FREE POLYISOPRENE POLYMER BEAD ANTI

## (undated) DEVICE — TRAY CATHETER 16FR F INCLUDE BARDX IC COMPLT CARE DRNGE BG

## (undated) DEVICE — SURGICAL SET UP - SURE SET: Brand: MEDLINE INDUSTRIES, INC.

## (undated) DEVICE — INTENDED FOR TISSUE SEPARATION, AND OTHER PROCEDURES THAT REQUIRE A SHARP SURGICAL BLADE TO PUNCTURE OR CUT.: Brand: BARD-PARKER ® CARBON RIB-BACK BLADES

## (undated) DEVICE — ELECTROSURGICAL PENCIL ROCKER SWITCH NON COATED BLADE ELECTRODE 10 FT (3 M) CORD HOLSTER: Brand: MEGADYNE

## (undated) DEVICE — SURE SET-DOUBLE BASIN-LF: Brand: MEDLINE INDUSTRIES, INC.

## (undated) DEVICE — Device

## (undated) DEVICE — 40583 XL ADVANCED TRENDELENBURG POSITIONING KIT: Brand: 40583 XL ADVANCED TRENDELENBURG POSITIONING KIT

## (undated) DEVICE — GARMENT,MEDLINE,DVT,INT,CALF,MED, GEN2: Brand: MEDLINE

## (undated) DEVICE — SUTURE PERMA-HAND SZ 2-0 L30IN NONABSORBABLE BLK L26MM SH K833H

## (undated) DEVICE — SPONGE,LAP,18"X18",DLX,XR,ST,5/PK,40/PK: Brand: MEDLINE

## (undated) DEVICE — SEALER/DIVIDER LAP SHFT L37CM JAW APER 11.4MM 315DEG ROT

## (undated) DEVICE — DRAPE,LAP,CHOLE,W/TROUGHS,STERILE: Brand: MEDLINE

## (undated) DEVICE — JEWISH HOSPITAL TURNOVER KIT: Brand: MEDLINE INDUSTRIES, INC.

## (undated) DEVICE — RESERVOIR,SUCTION,100CC,SILICONE: Brand: MEDLINE

## (undated) DEVICE — COVER LT HNDL BLU PLAS

## (undated) DEVICE — NEEDLE HYPO 22GA L1.5IN BLK POLYPR HUB S STL REG BVL STR

## (undated) DEVICE — ADHESIVE SKIN CLSR 0.7ML TOP DERMBND ADV

## (undated) DEVICE — DRAIN SURG 19FR 100% SIL RADPQ RND CHN FULL FLUT

## (undated) DEVICE — TROCAR: Brand: KII FIOS FIRST ENTRY

## (undated) DEVICE — SYSTEM SMK EVAC LAP TBNG FILTER HSNG BENT STYL PNK SEE CLR